# Patient Record
Sex: FEMALE | Race: WHITE | NOT HISPANIC OR LATINO | URBAN - METROPOLITAN AREA
[De-identification: names, ages, dates, MRNs, and addresses within clinical notes are randomized per-mention and may not be internally consistent; named-entity substitution may affect disease eponyms.]

---

## 2016-09-17 RX ORDER — FUROSEMIDE 40 MG
1 TABLET ORAL
Qty: 60 | Refills: 0 | COMMUNITY
Start: 2016-09-17 | End: 2016-10-17

## 2016-09-17 RX ORDER — INSULIN GLARGINE 100 [IU]/ML
80 INJECTION, SOLUTION SUBCUTANEOUS
Qty: 1 | Refills: 0 | COMMUNITY
Start: 2016-09-17 | End: 2016-10-17

## 2016-09-17 RX ORDER — TIMOLOL 0.5 %
1 DROPS OPHTHALMIC (EYE)
Qty: 1 | Refills: 0 | COMMUNITY
Start: 2016-09-17 | End: 2016-10-17

## 2016-09-17 RX ORDER — ENOXAPARIN SODIUM 100 MG/ML
80 INJECTION SUBCUTANEOUS
Qty: 1 | Refills: 0 | COMMUNITY
Start: 2016-09-17 | End: 2016-10-17

## 2016-09-17 RX ORDER — INSULIN GLARGINE 100 [IU]/ML
40 INJECTION, SOLUTION SUBCUTANEOUS
Qty: 1 | Refills: 0 | COMMUNITY
Start: 2016-09-17 | End: 2016-10-17

## 2016-09-17 RX ORDER — ATROPINE SULFATE 1 %
1 DROPS OPHTHALMIC (EYE)
Qty: 1 | Refills: 0 | COMMUNITY
Start: 2016-09-17 | End: 2016-10-17

## 2016-09-17 RX ORDER — BRIMONIDINE TARTRATE 2 MG/MG
1 SOLUTION/ DROPS OPHTHALMIC
Qty: 1 | Refills: 0 | COMMUNITY
Start: 2016-09-17 | End: 2016-10-17

## 2016-09-18 RX ORDER — TAMSULOSIN HYDROCHLORIDE 0.4 MG/1
2 CAPSULE ORAL
Qty: 60 | Refills: 0 | COMMUNITY
Start: 2016-09-18 | End: 2016-10-18

## 2017-02-08 ENCOUNTER — INPATIENT (INPATIENT)
Facility: HOSPITAL | Age: 62
LOS: 2 days | Discharge: HOME | DRG: 392 | End: 2017-02-11
Attending: INTERNAL MEDICINE | Admitting: FAMILY MEDICINE
Payer: MEDICAID

## 2017-02-08 VITALS
WEIGHT: 265 LBS | OXYGEN SATURATION: 97 % | HEIGHT: 61 IN | TEMPERATURE: 98 F | DIASTOLIC BLOOD PRESSURE: 60 MMHG | SYSTOLIC BLOOD PRESSURE: 132 MMHG | RESPIRATION RATE: 18 BRPM | HEART RATE: 80 BPM

## 2017-02-08 DIAGNOSIS — I24.9 ACUTE ISCHEMIC HEART DISEASE, UNSPECIFIED: ICD-10-CM

## 2017-02-08 DIAGNOSIS — Z98.89 OTHER SPECIFIED POSTPROCEDURAL STATES: Chronic | ICD-10-CM

## 2017-02-08 DIAGNOSIS — Z98.1 ARTHRODESIS STATUS: Chronic | ICD-10-CM

## 2017-02-08 DIAGNOSIS — Z90.710 ACQUIRED ABSENCE OF BOTH CERVIX AND UTERUS: Chronic | ICD-10-CM

## 2017-02-08 DIAGNOSIS — Z90.49 ACQUIRED ABSENCE OF OTHER SPECIFIED PARTS OF DIGESTIVE TRACT: Chronic | ICD-10-CM

## 2017-02-08 LAB
ALBUMIN SERPL ELPH-MCNC: 3.4 G/DL — SIGNIFICANT CHANGE UP (ref 3.3–5.2)
ALP SERPL-CCNC: 121 U/L — HIGH (ref 40–120)
ALT FLD-CCNC: 18 U/L — SIGNIFICANT CHANGE UP
ANION GAP SERPL CALC-SCNC: 13 MMOL/L — SIGNIFICANT CHANGE UP (ref 5–17)
APTT BLD: 30.6 SEC — SIGNIFICANT CHANGE UP (ref 27.5–37.4)
AST SERPL-CCNC: 17 U/L — SIGNIFICANT CHANGE UP
BASOPHILS # BLD AUTO: 0.1 K/UL — SIGNIFICANT CHANGE UP (ref 0–0.2)
BASOPHILS NFR BLD AUTO: 0.9 % — SIGNIFICANT CHANGE UP (ref 0–2)
BILIRUB SERPL-MCNC: 0.3 MG/DL — LOW (ref 0.4–2)
BUN SERPL-MCNC: 28 MG/DL — HIGH (ref 8–20)
CALCIUM SERPL-MCNC: 9.3 MG/DL — SIGNIFICANT CHANGE UP (ref 8.6–10.2)
CHLORIDE SERPL-SCNC: 103 MMOL/L — SIGNIFICANT CHANGE UP (ref 98–107)
CK SERPL-CCNC: 58 U/L — SIGNIFICANT CHANGE UP (ref 25–170)
CO2 SERPL-SCNC: 22 MMOL/L — SIGNIFICANT CHANGE UP (ref 22–29)
CREAT SERPL-MCNC: 0.79 MG/DL — SIGNIFICANT CHANGE UP (ref 0.5–1.3)
EOSINOPHIL # BLD AUTO: 0.6 K/UL — HIGH (ref 0–0.5)
EOSINOPHIL NFR BLD AUTO: 8.9 % — HIGH (ref 0–6)
GLUCOSE SERPL-MCNC: 249 MG/DL — HIGH (ref 70–115)
HCT VFR BLD CALC: 35.4 % — LOW (ref 37–47)
HGB BLD-MCNC: 11.3 G/DL — LOW (ref 12–16)
INR BLD: 1.02 RATIO — SIGNIFICANT CHANGE UP (ref 0.88–1.16)
LYMPHOCYTES # BLD AUTO: 1.6 K/UL — SIGNIFICANT CHANGE UP (ref 1–4.8)
LYMPHOCYTES # BLD AUTO: 24.3 % — SIGNIFICANT CHANGE UP (ref 20–55)
MCHC RBC-ENTMCNC: 28.3 PG — SIGNIFICANT CHANGE UP (ref 27–31)
MCHC RBC-ENTMCNC: 31.9 G/DL — LOW (ref 32–36)
MCV RBC AUTO: 88.5 FL — SIGNIFICANT CHANGE UP (ref 81–99)
MONOCYTES # BLD AUTO: 0.5 K/UL — SIGNIFICANT CHANGE UP (ref 0–0.8)
MONOCYTES NFR BLD AUTO: 7.4 % — SIGNIFICANT CHANGE UP (ref 3–10)
NEUTROPHILS # BLD AUTO: 3.8 K/UL — SIGNIFICANT CHANGE UP (ref 1.8–8)
NEUTROPHILS NFR BLD AUTO: 58.2 % — SIGNIFICANT CHANGE UP (ref 37–73)
NT-PROBNP SERPL-SCNC: 3832 PG/ML — HIGH (ref 0–300)
PLATELET # BLD AUTO: 156 K/UL — SIGNIFICANT CHANGE UP (ref 150–400)
POTASSIUM SERPL-MCNC: 3.7 MMOL/L — SIGNIFICANT CHANGE UP (ref 3.5–5.3)
POTASSIUM SERPL-SCNC: 3.7 MMOL/L — SIGNIFICANT CHANGE UP (ref 3.5–5.3)
PROT SERPL-MCNC: 7.4 G/DL — SIGNIFICANT CHANGE UP (ref 6.6–8.7)
PROTHROM AB SERPL-ACNC: 11.2 SEC — SIGNIFICANT CHANGE UP (ref 10–13.1)
RBC # BLD: 4 M/UL — LOW (ref 4.4–5.2)
RBC # FLD: 13.6 % — SIGNIFICANT CHANGE UP (ref 11–15.6)
SODIUM SERPL-SCNC: 138 MMOL/L — SIGNIFICANT CHANGE UP (ref 135–145)
TROPONIN T SERPL-MCNC: <0.01 NG/ML — SIGNIFICANT CHANGE UP (ref 0–0.06)
WBC # BLD: 6.49 K/UL — SIGNIFICANT CHANGE UP (ref 4.8–10.8)
WBC # FLD AUTO: 6.49 K/UL — SIGNIFICANT CHANGE UP (ref 4.8–10.8)

## 2017-02-08 PROCEDURE — 99223 1ST HOSP IP/OBS HIGH 75: CPT

## 2017-02-08 PROCEDURE — 99285 EMERGENCY DEPT VISIT HI MDM: CPT

## 2017-02-08 PROCEDURE — 93010 ELECTROCARDIOGRAM REPORT: CPT

## 2017-02-08 PROCEDURE — 73630 X-RAY EXAM OF FOOT: CPT | Mod: 26,LT

## 2017-02-08 PROCEDURE — 93306 TTE W/DOPPLER COMPLETE: CPT | Mod: 26

## 2017-02-08 PROCEDURE — 71010: CPT | Mod: 26

## 2017-02-08 RX ORDER — SODIUM CHLORIDE 9 MG/ML
3 INJECTION INTRAMUSCULAR; INTRAVENOUS; SUBCUTANEOUS ONCE
Qty: 0 | Refills: 0 | Status: COMPLETED | OUTPATIENT
Start: 2017-02-08 | End: 2017-02-08

## 2017-02-08 RX ORDER — FAMOTIDINE 10 MG/ML
20 INJECTION INTRAVENOUS ONCE
Qty: 0 | Refills: 0 | Status: COMPLETED | OUTPATIENT
Start: 2017-02-08 | End: 2017-02-08

## 2017-02-08 RX ORDER — ASPIRIN/CALCIUM CARB/MAGNESIUM 324 MG
325 TABLET ORAL ONCE
Qty: 0 | Refills: 0 | Status: COMPLETED | OUTPATIENT
Start: 2017-02-08 | End: 2017-02-08

## 2017-02-08 RX ORDER — PANTOPRAZOLE SODIUM 20 MG/1
40 TABLET, DELAYED RELEASE ORAL
Qty: 0 | Refills: 0 | Status: DISCONTINUED | OUTPATIENT
Start: 2017-02-08 | End: 2017-02-11

## 2017-02-08 RX ORDER — ENOXAPARIN SODIUM 100 MG/ML
40 INJECTION SUBCUTANEOUS EVERY 24 HOURS
Qty: 0 | Refills: 0 | Status: DISCONTINUED | OUTPATIENT
Start: 2017-02-08 | End: 2017-02-11

## 2017-02-08 RX ORDER — NITROGLYCERIN 6.5 MG
0.4 CAPSULE, EXTENDED RELEASE ORAL
Qty: 0 | Refills: 0 | Status: DISCONTINUED | OUTPATIENT
Start: 2017-02-08 | End: 2017-02-11

## 2017-02-08 RX ORDER — COLLAGENASE CLOSTRIDIUM HIST. 250 UNIT/G
1 OINTMENT (GRAM) TOPICAL ONCE
Qty: 0 | Refills: 0 | Status: COMPLETED | OUTPATIENT
Start: 2017-02-08 | End: 2017-02-08

## 2017-02-08 RX ORDER — LIDOCAINE 4 G/100G
5 CREAM TOPICAL ONCE
Qty: 0 | Refills: 0 | Status: COMPLETED | OUTPATIENT
Start: 2017-02-08 | End: 2017-02-08

## 2017-02-08 RX ADMIN — LIDOCAINE 5 MILLILITER(S): 4 CREAM TOPICAL at 19:38

## 2017-02-08 RX ADMIN — SODIUM CHLORIDE 3 MILLILITER(S): 9 INJECTION INTRAMUSCULAR; INTRAVENOUS; SUBCUTANEOUS at 17:38

## 2017-02-08 RX ADMIN — Medication 325 MILLIGRAM(S): at 19:37

## 2017-02-08 RX ADMIN — FAMOTIDINE 20 MILLIGRAM(S): 10 INJECTION INTRAVENOUS at 19:38

## 2017-02-08 RX ADMIN — Medication 30 MILLILITER(S): at 19:38

## 2017-02-08 NOTE — H&P ADULT. - RS GEN PE MLT RESP DETAILS PC
no intercostal retractions/clear to auscultation bilaterally/no rhonchi/no chest wall tenderness/no rales/no subcutaneous emphysema/no wheezes

## 2017-02-08 NOTE — ED PROVIDER NOTE - OBJECTIVE STATEMENT
62 y/o female in ED c/o midsternal burning cp today.  pt states h/o MI in the past and feels the same.  pt states MI in 8/2016.  states last MI had pain radiating to neck but no neck today.  pt denies any fever, HA, sob, n/v/d/abd pain.  no sick contacts or recent travel.  tolerating PO.

## 2017-02-08 NOTE — ED ADULT NURSE NOTE - PMH
Acute promyelocytic leukemia    Asthma    Blind  Left Eye  Diabetes mellitus    Diabetic neuropathy    Hyperlipidemia    Hypertension    Hypothyroidism    ANIKA treated with BiPAP    Osteoarthritis    Psoriatic arthritis    Tuberculosis  Treated at the age of 3 years

## 2017-02-08 NOTE — ED ADULT NURSE NOTE - OBJECTIVE STATEMENT
Pt A&OX3, c/o chest pain that radiated to her shoulders. Denies SOB at this time. VSS. Clear BBS. abd soft, nondistended and nontender. moving all extremities well. safety maintained. Pt A&OX3, c/o chest pain that radiated to her shoulders. Denies SOB at this time. VSS. Clear BBS. abd soft, nondistended and nontender. moving all extremities well. safety maintained.  Generalized skin rash noted.

## 2017-02-08 NOTE — H&P ADULT. - GASTROINTESTINAL DETAILS
normal/nontender/no rigidity/no distention/no masses palpable/no rebound tenderness/no organomegaly/no guarding/bowel sounds normal/no bruit/soft

## 2017-02-08 NOTE — ED ADULT NURSE REASSESSMENT NOTE - NS ED NURSE REASSESS COMMENT FT1
klever received awake and alert - states that she has chest pain and that she cannot tolerate any medication - right chest wall port accessed and in place unable to obtain blood, klever states that she wants nurses to make all attempts at pulling blood from her port rather then obtaining a butterfly stick - patient with several complaints, all of which the RN's have tried to accompany at this time. emotional support provided

## 2017-02-08 NOTE — H&P ADULT. - ASSESSMENT
ACS – atypical chest pain  -	Admit to Telemetry  -	Trend troponin  -	Cardiology consult appreciated  -	f/u ECHO  -	Nitro SL prn for chest pain  -	Cont aspirin/Plavix/bb/NC as needed.  -	Morphine 2mg   -	Zofran prn for nausea/vomiting  GERD   -	Maalox and pantoprazole  -	Pt is unable to recall last EGD, colonoscope Recommend out-patient GI follow-up for EGD   Left Heel wound, unstageable, dry, necrotic base  -	Podiatry consult appreciated  -	F/U ESR/CRP  -	No Antibiotics at this time  Tremors – cont out- patient neurological work-up with [facility]   Psoriasis – generalized  -	Pt refused treatment at this time  -	Nystatin powder for skin folds, mild candidiasis   Chronic Back pain  -	OOB with assistance, pt needs wheelchair for long distance commute   ANIKA on Cpap  -	Cont home CPAP, pt brought in her own  -	To be evaluated by [facility] for safety  -	Duonebs PRN for wheezing  DMT2  -	A1C 6.1 (9/16)  -	RISS, Accu checks, hypoglycemia protocol  -	DASH/Carb consistent diet  -	Lantus 40U HS, Novolog 15U TID  HLD  -	f/u lipid panel   -	cont statin 80mg daily  DVT prophylaxis

## 2017-02-08 NOTE — H&P ADULT. - FAMILY HISTORY
Sibling  Still living? Unknown  Family history of diabetes mellitus, Age at diagnosis: Age Unknown  Family history of cancer, Age at diagnosis: Age Unknown  Family history of blood disorder, Age at diagnosis: Age Unknown     Grandparent  Still living? Unknown  Family history of coronary artery disease, Age at diagnosis: Age Unknown

## 2017-02-08 NOTE — ED PROVIDER NOTE - PROGRESS NOTE DETAILS
case d/w Tino (marlene) in ED and will evaluate pt pt evalauted by Reyes and agree with admit for acs w/u case d/w Reyes (cards) in ED and will evaluate pt lee ann Champagne and will admit

## 2017-02-08 NOTE — ED ADULT NURSE NOTE - PSH
H/O abdominal hysterectomy  secondry to Fibroids  S/P carpal tunnel release    S/P cervical spinal fusion    S/P  section  x 6  S/P cholecystectomy

## 2017-02-09 DIAGNOSIS — E03.9 HYPOTHYROIDISM, UNSPECIFIED: ICD-10-CM

## 2017-02-09 DIAGNOSIS — R13.10 DYSPHAGIA, UNSPECIFIED: ICD-10-CM

## 2017-02-09 DIAGNOSIS — E11.9 TYPE 2 DIABETES MELLITUS WITHOUT COMPLICATIONS: ICD-10-CM

## 2017-02-09 DIAGNOSIS — Z29.9 ENCOUNTER FOR PROPHYLACTIC MEASURES, UNSPECIFIED: ICD-10-CM

## 2017-02-09 DIAGNOSIS — G47.33 OBSTRUCTIVE SLEEP APNEA (ADULT) (PEDIATRIC): ICD-10-CM

## 2017-02-09 DIAGNOSIS — I24.9 ACUTE ISCHEMIC HEART DISEASE, UNSPECIFIED: ICD-10-CM

## 2017-02-09 DIAGNOSIS — J45.909 UNSPECIFIED ASTHMA, UNCOMPLICATED: ICD-10-CM

## 2017-02-09 DIAGNOSIS — E66.01 MORBID (SEVERE) OBESITY DUE TO EXCESS CALORIES: ICD-10-CM

## 2017-02-09 DIAGNOSIS — L40.50 ARTHROPATHIC PSORIASIS, UNSPECIFIED: ICD-10-CM

## 2017-02-09 DIAGNOSIS — I10 ESSENTIAL (PRIMARY) HYPERTENSION: ICD-10-CM

## 2017-02-09 DIAGNOSIS — L97.409 NON-PRESSURE CHRONIC ULCER OF UNSPECIFIED HEEL AND MIDFOOT WITH UNSPECIFIED SEVERITY: ICD-10-CM

## 2017-02-09 DIAGNOSIS — I26.99 OTHER PULMONARY EMBOLISM WITHOUT ACUTE COR PULMONALE: ICD-10-CM

## 2017-02-09 DIAGNOSIS — E11.40 TYPE 2 DIABETES MELLITUS WITH DIABETIC NEUROPATHY, UNSPECIFIED: ICD-10-CM

## 2017-02-09 LAB — TROPONIN T SERPL-MCNC: <0.01 NG/ML — SIGNIFICANT CHANGE UP (ref 0–0.06)

## 2017-02-09 PROCEDURE — 99233 SBSQ HOSP IP/OBS HIGH 50: CPT

## 2017-02-09 RX ORDER — HYDRALAZINE HCL 50 MG
10 TABLET ORAL THREE TIMES A DAY
Qty: 0 | Refills: 0 | Status: DISCONTINUED | OUTPATIENT
Start: 2017-02-09 | End: 2017-02-11

## 2017-02-09 RX ORDER — ATORVASTATIN CALCIUM 80 MG/1
80 TABLET, FILM COATED ORAL AT BEDTIME
Qty: 0 | Refills: 0 | Status: DISCONTINUED | OUTPATIENT
Start: 2017-02-09 | End: 2017-02-11

## 2017-02-09 RX ORDER — INSULIN GLARGINE 100 [IU]/ML
40 INJECTION, SOLUTION SUBCUTANEOUS AT BEDTIME
Qty: 0 | Refills: 0 | Status: DISCONTINUED | OUTPATIENT
Start: 2017-02-09 | End: 2017-02-11

## 2017-02-09 RX ORDER — LANOLIN/MINERAL OIL
1 LOTION (ML) TOPICAL
Qty: 0 | Refills: 0 | Status: DISCONTINUED | OUTPATIENT
Start: 2017-02-09 | End: 2017-02-11

## 2017-02-09 RX ORDER — DOCUSATE SODIUM 100 MG
100 CAPSULE ORAL
Qty: 0 | Refills: 0 | Status: DISCONTINUED | OUTPATIENT
Start: 2017-02-09 | End: 2017-02-11

## 2017-02-09 RX ORDER — DEXTROSE 50 % IN WATER 50 %
1 SYRINGE (ML) INTRAVENOUS ONCE
Qty: 0 | Refills: 0 | Status: DISCONTINUED | OUTPATIENT
Start: 2017-02-09 | End: 2017-02-11

## 2017-02-09 RX ORDER — ASPIRIN/CALCIUM CARB/MAGNESIUM 324 MG
81 TABLET ORAL DAILY
Qty: 0 | Refills: 0 | Status: DISCONTINUED | OUTPATIENT
Start: 2017-02-09 | End: 2017-02-11

## 2017-02-09 RX ORDER — GLUCAGON INJECTION, SOLUTION 0.5 MG/.1ML
1 INJECTION, SOLUTION SUBCUTANEOUS ONCE
Qty: 0 | Refills: 0 | Status: DISCONTINUED | OUTPATIENT
Start: 2017-02-09 | End: 2017-02-11

## 2017-02-09 RX ORDER — IPRATROPIUM/ALBUTEROL SULFATE 18-103MCG
3 AEROSOL WITH ADAPTER (GRAM) INHALATION EVERY 6 HOURS
Qty: 0 | Refills: 0 | Status: DISCONTINUED | OUTPATIENT
Start: 2017-02-09 | End: 2017-02-11

## 2017-02-09 RX ORDER — TIMOLOL 0.5 %
1 DROPS OPHTHALMIC (EYE)
Qty: 0 | Refills: 0 | Status: DISCONTINUED | OUTPATIENT
Start: 2017-02-09 | End: 2017-02-11

## 2017-02-09 RX ORDER — COLLAGENASE CLOSTRIDIUM HIST. 250 UNIT/G
1 OINTMENT (GRAM) TOPICAL DAILY
Qty: 0 | Refills: 0 | Status: DISCONTINUED | OUTPATIENT
Start: 2017-02-09 | End: 2017-02-11

## 2017-02-09 RX ORDER — DEXTROSE 50 % IN WATER 50 %
25 SYRINGE (ML) INTRAVENOUS ONCE
Qty: 0 | Refills: 0 | Status: DISCONTINUED | OUTPATIENT
Start: 2017-02-09 | End: 2017-02-11

## 2017-02-09 RX ORDER — CARVEDILOL PHOSPHATE 80 MG/1
6.25 CAPSULE, EXTENDED RELEASE ORAL EVERY 12 HOURS
Qty: 0 | Refills: 0 | Status: DISCONTINUED | OUTPATIENT
Start: 2017-02-09 | End: 2017-02-11

## 2017-02-09 RX ORDER — ONDANSETRON 8 MG/1
4 TABLET, FILM COATED ORAL EVERY 6 HOURS
Qty: 0 | Refills: 0 | Status: DISCONTINUED | OUTPATIENT
Start: 2017-02-09 | End: 2017-02-11

## 2017-02-09 RX ORDER — ATROPINE SULFATE 1 %
1 DROPS OPHTHALMIC (EYE)
Qty: 0 | Refills: 0 | Status: DISCONTINUED | OUTPATIENT
Start: 2017-02-09 | End: 2017-02-11

## 2017-02-09 RX ORDER — ISOSORBIDE MONONITRATE 60 MG/1
60 TABLET, EXTENDED RELEASE ORAL DAILY
Qty: 0 | Refills: 0 | Status: DISCONTINUED | OUTPATIENT
Start: 2017-02-09 | End: 2017-02-11

## 2017-02-09 RX ORDER — FUROSEMIDE 40 MG
40 TABLET ORAL
Qty: 0 | Refills: 0 | Status: DISCONTINUED | OUTPATIENT
Start: 2017-02-09 | End: 2017-02-11

## 2017-02-09 RX ORDER — DEXTROSE 50 % IN WATER 50 %
12.5 SYRINGE (ML) INTRAVENOUS ONCE
Qty: 0 | Refills: 0 | Status: DISCONTINUED | OUTPATIENT
Start: 2017-02-09 | End: 2017-02-11

## 2017-02-09 RX ORDER — INSULIN LISPRO 100/ML
VIAL (ML) SUBCUTANEOUS
Qty: 0 | Refills: 0 | Status: DISCONTINUED | OUTPATIENT
Start: 2017-02-09 | End: 2017-02-11

## 2017-02-09 RX ORDER — CLOPIDOGREL BISULFATE 75 MG/1
75 TABLET, FILM COATED ORAL DAILY
Qty: 0 | Refills: 0 | Status: DISCONTINUED | OUTPATIENT
Start: 2017-02-09 | End: 2017-02-11

## 2017-02-09 RX ORDER — LEVOTHYROXINE SODIUM 125 MCG
100 TABLET ORAL DAILY
Qty: 0 | Refills: 0 | Status: DISCONTINUED | OUTPATIENT
Start: 2017-02-09 | End: 2017-02-09

## 2017-02-09 RX ORDER — INSULIN LISPRO 100/ML
15 VIAL (ML) SUBCUTANEOUS
Qty: 0 | Refills: 0 | Status: DISCONTINUED | OUTPATIENT
Start: 2017-02-09 | End: 2017-02-11

## 2017-02-09 RX ORDER — LEVOTHYROXINE SODIUM 125 MCG
112 TABLET ORAL DAILY
Qty: 0 | Refills: 0 | Status: DISCONTINUED | OUTPATIENT
Start: 2017-02-09 | End: 2017-02-11

## 2017-02-09 RX ORDER — MORPHINE SULFATE 50 MG/1
2 CAPSULE, EXTENDED RELEASE ORAL EVERY 6 HOURS
Qty: 0 | Refills: 0 | Status: DISCONTINUED | OUTPATIENT
Start: 2017-02-09 | End: 2017-02-11

## 2017-02-09 RX ORDER — TAMSULOSIN HYDROCHLORIDE 0.4 MG/1
0.4 CAPSULE ORAL AT BEDTIME
Qty: 0 | Refills: 0 | Status: DISCONTINUED | OUTPATIENT
Start: 2017-02-09 | End: 2017-02-11

## 2017-02-09 RX ORDER — SODIUM CHLORIDE 9 MG/ML
1000 INJECTION, SOLUTION INTRAVENOUS
Qty: 0 | Refills: 0 | Status: DISCONTINUED | OUTPATIENT
Start: 2017-02-09 | End: 2017-02-11

## 2017-02-09 RX ORDER — BRIMONIDINE TARTRATE 2 MG/MG
1 SOLUTION/ DROPS OPHTHALMIC THREE TIMES A DAY
Qty: 0 | Refills: 0 | Status: DISCONTINUED | OUTPATIENT
Start: 2017-02-09 | End: 2017-02-11

## 2017-02-09 RX ORDER — MOMETASONE FUROATE 220 UG/1
1 INHALANT RESPIRATORY (INHALATION) DAILY
Qty: 0 | Refills: 0 | Status: DISCONTINUED | OUTPATIENT
Start: 2017-02-09 | End: 2017-02-11

## 2017-02-09 RX ORDER — MOMETASONE FUROATE 220 UG/1
1 INHALANT RESPIRATORY (INHALATION) DAILY
Qty: 0 | Refills: 0 | Status: DISCONTINUED | OUTPATIENT
Start: 2017-02-09 | End: 2017-02-09

## 2017-02-09 RX ADMIN — BRIMONIDINE TARTRATE 1 DROP(S): 2 SOLUTION/ DROPS OPHTHALMIC at 22:41

## 2017-02-09 RX ADMIN — Medication 10 MILLIGRAM(S): at 22:40

## 2017-02-09 RX ADMIN — TAMSULOSIN HYDROCHLORIDE 0.4 MILLIGRAM(S): 0.4 CAPSULE ORAL at 22:41

## 2017-02-09 RX ADMIN — Medication 40 MILLIGRAM(S): at 18:31

## 2017-02-09 RX ADMIN — Medication 1 DROP(S): at 18:31

## 2017-02-09 RX ADMIN — ONDANSETRON 4 MILLIGRAM(S): 8 TABLET, FILM COATED ORAL at 22:40

## 2017-02-09 RX ADMIN — Medication 4: at 13:43

## 2017-02-09 RX ADMIN — Medication 81 MILLIGRAM(S): at 12:15

## 2017-02-09 RX ADMIN — Medication 10 MILLIGRAM(S): at 05:50

## 2017-02-09 RX ADMIN — Medication 1 APPLICATION(S): at 05:50

## 2017-02-09 RX ADMIN — Medication 1 APPLICATION(S): at 13:39

## 2017-02-09 RX ADMIN — Medication 300 MILLIGRAM(S): at 22:40

## 2017-02-09 RX ADMIN — ATORVASTATIN CALCIUM 80 MILLIGRAM(S): 80 TABLET, FILM COATED ORAL at 22:40

## 2017-02-09 RX ADMIN — CARVEDILOL PHOSPHATE 6.25 MILLIGRAM(S): 80 CAPSULE, EXTENDED RELEASE ORAL at 05:50

## 2017-02-09 RX ADMIN — BRIMONIDINE TARTRATE 1 DROP(S): 2 SOLUTION/ DROPS OPHTHALMIC at 13:53

## 2017-02-09 RX ADMIN — Medication 100 MICROGRAM(S): at 05:50

## 2017-02-09 RX ADMIN — MORPHINE SULFATE 2 MILLIGRAM(S): 50 CAPSULE, EXTENDED RELEASE ORAL at 22:40

## 2017-02-09 RX ADMIN — Medication 40 MILLIGRAM(S): at 05:50

## 2017-02-09 RX ADMIN — Medication: at 23:30

## 2017-02-09 RX ADMIN — CLOPIDOGREL BISULFATE 75 MILLIGRAM(S): 75 TABLET, FILM COATED ORAL at 12:15

## 2017-02-09 RX ADMIN — Medication 100 MILLIGRAM(S): at 05:50

## 2017-02-09 RX ADMIN — Medication 4: at 09:45

## 2017-02-09 RX ADMIN — ISOSORBIDE MONONITRATE 60 MILLIGRAM(S): 60 TABLET, EXTENDED RELEASE ORAL at 12:15

## 2017-02-09 RX ADMIN — INSULIN GLARGINE 40 UNIT(S): 100 INJECTION, SOLUTION SUBCUTANEOUS at 22:40

## 2017-02-09 RX ADMIN — Medication 150 MILLIGRAM(S): at 13:43

## 2017-02-09 RX ADMIN — MORPHINE SULFATE 2 MILLIGRAM(S): 50 CAPSULE, EXTENDED RELEASE ORAL at 22:44

## 2017-02-09 RX ADMIN — CARVEDILOL PHOSPHATE 6.25 MILLIGRAM(S): 80 CAPSULE, EXTENDED RELEASE ORAL at 18:31

## 2017-02-09 RX ADMIN — PANTOPRAZOLE SODIUM 40 MILLIGRAM(S): 20 TABLET, DELAYED RELEASE ORAL at 05:50

## 2017-02-09 RX ADMIN — Medication 10 MILLIGRAM(S): at 13:43

## 2017-02-09 RX ADMIN — Medication 1 APPLICATION(S): at 18:31

## 2017-02-09 RX ADMIN — Medication 15 UNIT(S): at 13:44

## 2017-02-09 RX ADMIN — Medication 15 UNIT(S): at 09:44

## 2017-02-09 NOTE — CONSULT NOTE ADULT - ASSESSMENT
Chronic sensation of lump in lower chest with episodic nausea/ vomiting.  No recent EGD.  No hx of foreign bodies.  No hx of fungal esophagitis.  CT negative for HH or obvious esophageal pathology.  Other chest pain is not so atypical for angina.  Never had cardiac cath and newly placed on Plavix along with chronic aspirin.  Morbidly obese with ANIKA requiring  use of CPAP.  Low grade chronic dysphagia of unclear etiology;  no weight loss:  Current weight is about 265 and BMI: 50.

## 2017-02-09 NOTE — PROGRESS NOTE ADULT - PROBLEM SELECTOR PLAN 1
Negative cardiac enzymes  nothing on CM  Awaiting Echo results  Appreciate Cardiology input  Will get GI to see patient, requests [facility]  Protonix  Patient states is on ASA/Plavix for CAD, never had LHC due to inability to lay flat Negative cardiac enzymes  nothing on CM  Awaiting Echo results  Appreciate Cardiology input  Will get GI to see patient, requests [facility]  Protonix  Patient states is on ASA/Plavix for CAD, never had LHC due to inability to lay flat, followed by Dr. Bates

## 2017-02-09 NOTE — PROGRESS NOTE ADULT - ASSESSMENT
61yFemale PMHX CMP, HTN, DM admitted with atypical chest pain, has ruled out ACS. Pain likely GI and not cardiac asper Cardiology. Left heel necrotic foot ulcer noted. 61yFemale PMHX CMP, HTN, DM admitted with atypical chest pain, has ruled out ACS. Pain likely GI and not cardiac asper Cardiology, echo results pending. Left heel necrotic foot ulcer noted.

## 2017-02-09 NOTE — PROGRESS NOTE ADULT - PROBLEM SELECTOR PLAN 3
HGA1C pending  Lantus/Premeal/ISS HGA1C pending  Lantus/Premeal/ISS- did not receive Lantus last night  consistent carb diet

## 2017-02-09 NOTE — PROGRESS NOTE ADULT - SUBJECTIVE AND OBJECTIVE BOX
CC: chest discomfort (08 Feb 2017 21:05)    HPI:  This is a 61year old female developed "hot" mid-sternal chest sensation Tuesday evening after going to bed, the following morning her discomfort progressed to burning sensation which radiated to her jaw for a short duration, she is unable to specify time, aggravating or alleviating factors. She has experienced a prior episode during her myocardial infarction in 2016. She is currently under neurology evaluation for ongoing intermittent tremors which last occurred yesterday. Denies: fever, chills, cough, shortness of breath, nausea, vomiting, diarrhea, weight loss.   PT follows with Dr. Luna as her PCP, she is aware that once admitted Dr. Balderas will be her attending, patient refused and request hospitalist coverage for this visit. no specific reason given. (08 Feb 2017 21:05)    INTERVAL HPI/OVERNIGHT EVENTS: No events on monitor, states feels like food "gets stuck" when swallowing    Vital Signs Last 24 Hrs  T(C): 36.6, Max: 36.8 (02-08 @ 14:20)  T(F): 97.9, Max: 98.2 (02-08 @ 14:20)  HR: 71 (71 - 88)  BP: 122/56 (102/55 - 132/60)  BP(mean): --  RR: 16 (16 - 18)  SpO2: 98% (94% - 98%)  I&O's Detail      CARDIAC MARKERS ( 09 Feb 2017 06:01 )  x     / <0.01 ng/mL / x     / x     / x      CARDIAC MARKERS ( 08 Feb 2017 19:28 )  x     / <0.01 ng/mL / 58 U/L / x     / x                                11.3   6.49  )-----------( 156      ( 08 Feb 2017 19:28 )             35.4     08 Feb 2017 19:28    138    |  103    |  28.0   ----------------------------<  249    3.7     |  22.0   |  0.79     Ca    9.3        08 Feb 2017 19:28    TPro  7.4    /  Alb  3.4    /  TBili  0.3    /  DBili  x      /  AST  17     /  ALT  18     /  AlkPhos  121    08 Feb 2017 19:28    PT/INR - ( 08 Feb 2017 19:28 )   PT: 11.2 sec;   INR: 1.02 ratio         PTT - ( 08 Feb 2017 19:28 )  PTT:30.6 sec  CAPILLARY BLOOD GLUCOSE  241 (09 Feb 2017 08:31)  248 (09 Feb 2017 00:06)    LIVER FUNCTIONS - ( 08 Feb 2017 19:28 )  Alb: 3.4 g/dL / Pro: 7.4 g/dL / ALK PHOS: 121 U/L / ALT: 18 U/L / AST: 17 U/L / GGT: x           MEDICATIONS  (STANDING):  enoxaparin Injectable 40milliGRAM(s) SubCutaneous every 24 hours  pantoprazole    Tablet 40milliGRAM(s) Oral before breakfast  aspirin enteric coated 81milliGRAM(s) Oral daily  tamsulosin 0.4milliGRAM(s) Oral at bedtime  isosorbide   mononitrate ER Tablet (IMDUR) 60milliGRAM(s) Oral daily  pregabalin 300milliGRAM(s) Oral at bedtime  insulin glargine Injectable (LANTUS) 40Unit(s) SubCutaneous at bedtime  insulin lispro Injectable (HumaLOG) 15Unit(s) SubCutaneous three times a day before meals  insulin lispro (HumaLOG) corrective regimen sliding scale  SubCutaneous Before meals and at bedtime  dextrose 5%. 1000milliLiter(s) IV Continuous <Continuous>  dextrose 50% Injectable 12.5Gram(s) IV Push once  dextrose 50% Injectable 25Gram(s) IV Push once  dextrose 50% Injectable 25Gram(s) IV Push once  atorvastatin 80milliGRAM(s) Oral at bedtime  clopidogrel Tablet 75milliGRAM(s) Oral daily  carvedilol 6.25milliGRAM(s) Oral every 12 hours  mineral oil/petrolatum Hydrophilic Ointment 1Application(s) Topical two times a day  furosemide    Tablet 40milliGRAM(s) Oral two times a day  docusate sodium 100milliGRAM(s) Oral two times a day  mometasone 220 MICROgram(s) Inhaler 1Puff(s) Inhalation daily  hydrALAZINE 10milliGRAM(s) Oral three times a day  levothyroxine 112MICROGram(s) Oral daily    MEDICATIONS  (PRN):  nitroglycerin     SubLingual 0.4milliGRAM(s) SubLingual every 5 minutes PRN Chest Pain  aluminum hydroxide/magnesium hydroxide/simethicone Suspension 30milliLiter(s) Oral every 4 hours PRN Dyspepsia  dextrose Gel 1Dose(s) Oral once PRN Blood Glucose LESS THAN 70 milliGRAM(s)/deciliter  glucagon  Injectable 1milliGRAM(s) IntraMuscular once PRN Glucose LESS THAN 70 milligrams/deciliter  ALBUTerol/ipratropium for Nebulization 3milliLiter(s) Nebulizer every 6 hours PRN Shortness of Breath and/or Wheezing  morphine  - Injectable 2milliGRAM(s) IV Push every 6 hours PRN Severe Pain (7 - 10)  ondansetron Injectable 4milliGRAM(s) IV Push every 6 hours PRN Nausea and/or Vomiting      RADIOLOGY & ADDITIONAL TESTS:  ROS:  + left heal pain + regurgitates when eating  Denies chest pain, SOB, dizziness, lightheadedness, fever, chills, nausea, vomitting

## 2017-02-09 NOTE — PROGRESS NOTE ADULT - SUBJECTIVE AND OBJECTIVE BOX
GEMINI ROONEY  [id number]        Chief Complaint: f/u CP      Subjective: feels better, no recurrent CP      24 hour Tele: SR, no events        enoxaparin Injectable 40milliGRAM(s) SubCutaneous every 24 hours  nitroglycerin     SubLingual 0.4milliGRAM(s) SubLingual every 5 minutes PRN  aluminum hydroxide/magnesium hydroxide/simethicone Suspension 30milliLiter(s) Oral every 4 hours PRN  pantoprazole    Tablet 40milliGRAM(s) Oral before breakfast  aspirin enteric coated 81milliGRAM(s) Oral daily  tamsulosin 0.4milliGRAM(s) Oral at bedtime  isosorbide   mononitrate ER Tablet (IMDUR) 60milliGRAM(s) Oral daily  pregabalin 300milliGRAM(s) Oral at bedtime  insulin glargine Injectable (LANTUS) 40Unit(s) SubCutaneous at bedtime  insulin lispro Injectable (HumaLOG) 15Unit(s) SubCutaneous three times a day before meals  insulin lispro (HumaLOG) corrective regimen sliding scale  SubCutaneous Before meals and at bedtime  dextrose 5%. 1000milliLiter(s) IV Continuous <Continuous>  dextrose Gel 1Dose(s) Oral once PRN  dextrose 50% Injectable 12.5Gram(s) IV Push once  dextrose 50% Injectable 25Gram(s) IV Push once  dextrose 50% Injectable 25Gram(s) IV Push once  glucagon  Injectable 1milliGRAM(s) IntraMuscular once PRN  atorvastatin 80milliGRAM(s) Oral at bedtime  clopidogrel Tablet 75milliGRAM(s) Oral daily  carvedilol 6.25milliGRAM(s) Oral every 12 hours  ALBUTerol/ipratropium for Nebulization 3milliLiter(s) Nebulizer every 6 hours PRN  mineral oil/petrolatum Hydrophilic Ointment 1Application(s) Topical two times a day  furosemide    Tablet 40milliGRAM(s) Oral two times a day  docusate sodium 100milliGRAM(s) Oral two times a day  mometasone 220 MICROgram(s) Inhaler 1Puff(s) Inhalation daily  hydrALAZINE 10milliGRAM(s) Oral three times a day  levothyroxine 100MICROGram(s) Oral daily  morphine  - Injectable 2milliGRAM(s) IV Push every 6 hours PRN  ondansetron Injectable 4milliGRAM(s) IV Push every 6 hours PRN          Physical Exam:  T(C): 36.6, Max: 36.8 (02-08 @ 14:20)  HR: 71 (71 - 88)  BP: 122/56 (102/55 - 132/60)  RR: 16 (16 - 18)  SpO2: 98% (94% - 98%)  Wt(kg): --  General: Comfortable in NAD  HEENT: MMM, sclera anicteruc  Resp: CTA bilaterally, diminished at bases  CVS: soft s1s2, rrr, no s3/JVD  Abd: obese soft, NT, ND, BS+  Ext: No c/c/e, psoriatic plaque noted  Neuro A&O x3  Psych: Normal affect    I&O's Summary        Labs:   08 Feb 2017 19:28    138    |  103    |  28.0   ----------------------------<  249    3.7     |  22.0   |  0.79     Ca    9.3        08 Feb 2017 19:28    TPro  7.4    /  Alb  3.4    /  TBili  0.3    /  DBili  x      /  AST  17     /  ALT  18     /  AlkPhos  121    08 Feb 2017 19:28                          11.3   6.49  )-----------( 156      ( 08 Feb 2017 19:28 )             35.4     PT/INR - ( 08 Feb 2017 19:28 )   PT: 11.2 sec;   INR: 1.02 ratio         PTT - ( 08 Feb 2017 19:28 )  PTT:30.6 sec  CARDIAC MARKERS ( 09 Feb 2017 06:01 )  x     / <0.01 ng/mL / x     / x     / x      CARDIAC MARKERS ( 08 Feb 2017 19:28 )  x     / <0.01 ng/mL / 58 U/L / x     / x          Assessment:  61yFemale PMHX CMP, HTN, DM admitted with atypical chest pain, has ruled out ACS. Pain likely GI and not cardiac. Has elevation in BNP likely related to chronic systolic CHF, does not have significant decompensated CHF clinically.     Plan:  1. Follow up echo, otherwise no further cardiac work up if no significant change  2. Continue CV medications and diuretics to maintain euvolemia. On good medical regimen  3. Has had problems with renal insufficiency in the past and would not start ACEI at this time, may have been related  4. DC planning

## 2017-02-09 NOTE — PROGRESS NOTE ADULT - SUBJECTIVE AND OBJECTIVE BOX
A 61 year old female patient is seen bedside for a left posterior heel ulceration. The patient is admitted for chest pain. She states yesterday she felt some burning in the chest but assumed it was caused by the shrimp she had eaten and was simply acid reflux. She follows up with Satnam Becerra as an outpatient weekly for the left heel ulceration.  Patient is AAo x 3  Denies any N/F/C/V/D/SOB  NAD at this time    Allegies:  -Demerol  -Iodine  -Tramadol    PMH:  -DMII  -HTN  -TB  -hyperlipidemia  -OA  -legally blind    PSH:  -Carpal tunnel   -Csection  -cervical spine fusion    Vasc:  -DP/PT faintly palpable  -TG WNL  -CFT 3 secs x 10    Derm:  -Left posterior heel ulceration  -Wound has a necrotic cap  -No malodor  -no acute infectious signs  -serosanguinous discharge noted    Ortho:  -Muscle power is 5/5 bilaterally  -hammering of digits 2-5 bilaterally    Neuro:  -protective sensation slightly diminished    Assessment:  -Left posterior heel ulceration    Plan:  -Patient evaluated and her chart reviewed  -Left foot Xrays negative for OM  -Vascular consult pending  -Cleaned wound site with sterile saline flush  -Santyl/DSD dressing applied  -Ordered Santyl to be applied to the wound site daily  -Wound care orders in place for the nursing staff  -Podiatry will monitor the patient while she is in-house  -Thank you for the consultation

## 2017-02-09 NOTE — CONSULT NOTE ADULT - PROBLEM SELECTOR RECOMMENDATION 3
Low probability for FB in esophagus. or gastric dysmotility.  With request UGI/ esophagram for AM.  High anesthesia risk for EGD.  Maintain on chronic PPI therapy.

## 2017-02-10 DIAGNOSIS — K21.9 GASTRO-ESOPHAGEAL REFLUX DISEASE WITHOUT ESOPHAGITIS: ICD-10-CM

## 2017-02-10 DIAGNOSIS — I42.9 CARDIOMYOPATHY, UNSPECIFIED: ICD-10-CM

## 2017-02-10 PROCEDURE — 99233 SBSQ HOSP IP/OBS HIGH 50: CPT

## 2017-02-10 PROCEDURE — 93010 ELECTROCARDIOGRAM REPORT: CPT

## 2017-02-10 PROCEDURE — 74241: CPT | Mod: 26

## 2017-02-10 RX ORDER — MORPHINE SULFATE 50 MG/1
4 CAPSULE, EXTENDED RELEASE ORAL ONCE
Qty: 0 | Refills: 0 | Status: DISCONTINUED | OUTPATIENT
Start: 2017-02-10 | End: 2017-02-10

## 2017-02-10 RX ADMIN — Medication 81 MILLIGRAM(S): at 12:20

## 2017-02-10 RX ADMIN — Medication 150 MILLIGRAM(S): at 12:29

## 2017-02-10 RX ADMIN — CARVEDILOL PHOSPHATE 6.25 MILLIGRAM(S): 80 CAPSULE, EXTENDED RELEASE ORAL at 05:28

## 2017-02-10 RX ADMIN — MORPHINE SULFATE 4 MILLIGRAM(S): 50 CAPSULE, EXTENDED RELEASE ORAL at 12:54

## 2017-02-10 RX ADMIN — CARVEDILOL PHOSPHATE 6.25 MILLIGRAM(S): 80 CAPSULE, EXTENDED RELEASE ORAL at 17:49

## 2017-02-10 RX ADMIN — MORPHINE SULFATE 2 MILLIGRAM(S): 50 CAPSULE, EXTENDED RELEASE ORAL at 22:01

## 2017-02-10 RX ADMIN — Medication 300 MILLIGRAM(S): at 21:55

## 2017-02-10 RX ADMIN — Medication 6: at 12:30

## 2017-02-10 RX ADMIN — PANTOPRAZOLE SODIUM 40 MILLIGRAM(S): 20 TABLET, DELAYED RELEASE ORAL at 05:27

## 2017-02-10 RX ADMIN — Medication 40 MILLIGRAM(S): at 05:27

## 2017-02-10 RX ADMIN — Medication 1 APPLICATION(S): at 12:19

## 2017-02-10 RX ADMIN — Medication 40 MILLIGRAM(S): at 17:49

## 2017-02-10 RX ADMIN — Medication 1 APPLICATION(S): at 05:29

## 2017-02-10 RX ADMIN — ENOXAPARIN SODIUM 40 MILLIGRAM(S): 100 INJECTION SUBCUTANEOUS at 21:57

## 2017-02-10 RX ADMIN — ISOSORBIDE MONONITRATE 60 MILLIGRAM(S): 60 TABLET, EXTENDED RELEASE ORAL at 12:21

## 2017-02-10 RX ADMIN — Medication 6: at 09:30

## 2017-02-10 RX ADMIN — BRIMONIDINE TARTRATE 1 DROP(S): 2 SOLUTION/ DROPS OPHTHALMIC at 21:56

## 2017-02-10 RX ADMIN — CLOPIDOGREL BISULFATE 75 MILLIGRAM(S): 75 TABLET, FILM COATED ORAL at 12:20

## 2017-02-10 RX ADMIN — Medication 10 MILLIGRAM(S): at 05:28

## 2017-02-10 RX ADMIN — Medication 112 MICROGRAM(S): at 05:27

## 2017-02-10 RX ADMIN — Medication 1 DROP(S): at 17:49

## 2017-02-10 RX ADMIN — Medication 15 UNIT(S): at 17:50

## 2017-02-10 RX ADMIN — Medication 10 MILLIGRAM(S): at 21:55

## 2017-02-10 RX ADMIN — ONDANSETRON 4 MILLIGRAM(S): 8 TABLET, FILM COATED ORAL at 12:21

## 2017-02-10 RX ADMIN — ONDANSETRON 4 MILLIGRAM(S): 8 TABLET, FILM COATED ORAL at 22:01

## 2017-02-10 RX ADMIN — MORPHINE SULFATE 4 MILLIGRAM(S): 50 CAPSULE, EXTENDED RELEASE ORAL at 12:21

## 2017-02-10 RX ADMIN — MORPHINE SULFATE 2 MILLIGRAM(S): 50 CAPSULE, EXTENDED RELEASE ORAL at 23:09

## 2017-02-10 RX ADMIN — Medication 1 APPLICATION(S): at 17:43

## 2017-02-10 RX ADMIN — Medication 1 DROP(S): at 05:28

## 2017-02-10 RX ADMIN — Medication 100 MILLIGRAM(S): at 05:28

## 2017-02-10 RX ADMIN — ATORVASTATIN CALCIUM 80 MILLIGRAM(S): 80 TABLET, FILM COATED ORAL at 21:55

## 2017-02-10 RX ADMIN — TAMSULOSIN HYDROCHLORIDE 0.4 MILLIGRAM(S): 0.4 CAPSULE ORAL at 21:57

## 2017-02-10 RX ADMIN — BRIMONIDINE TARTRATE 1 DROP(S): 2 SOLUTION/ DROPS OPHTHALMIC at 05:28

## 2017-02-10 RX ADMIN — INSULIN GLARGINE 40 UNIT(S): 100 INJECTION, SOLUTION SUBCUTANEOUS at 21:56

## 2017-02-10 RX ADMIN — Medication 2: at 17:50

## 2017-02-10 NOTE — PROGRESS NOTE ADULT - PROBLEM SELECTOR PLAN 3
HGA1C pending  Lantus/Premeal/ISS  consistent carb diet.   monitor BG may need to increase Lantus if remains elevated, has been NPO for tests today

## 2017-02-10 NOTE — PROGRESS NOTE ADULT - SUBJECTIVE AND OBJECTIVE BOX
CC: chest discomfort    HPI:  This is a 61year old female developed "hot" mid-sternal chest sensation Tuesday evening after going to bed, the following morning her discomfort progressed to burning sensation which radiated to her jaw for a short duration, she is unable to specify time, aggravating or alleviating factors. She has experienced a prior episode during her myocardial infarction in 2016. She is currently under neurology evaluation for ongoing intermittent tremors which last occurred yesterday. Denies: fever, chills, cough, shortness of breath, nausea, vomiting, diarrhea, weight loss.       INTERVAL HPI/OVERNIGHT EVENTS: Generalized pain    Vital Signs Last 24 Hrs  T(C): 36.8, Max: 36.9 (02-09 @ 12:12)  T(F): 98.2, Max: 98.4 (02-09 @ 12:12)  HR: 81 (69 - 81)  BP: 112/66 (112/66 - 120/62)  BP(mean): --  RR: 16 (16 - 16)  SpO2: 96% (96% - 100%)  I&O's Detail    I & Os for current day (as of 10 Feb 2017 11:42)  =============================================  IN:    Oral Fluid: 240 ml    Total IN: 240 ml  ---------------------------------------------  OUT:    Voided: 1 ml    Total OUT: 1 ml  ---------------------------------------------  Total NET: 239 ml      CARDIAC MARKERS ( 09 Feb 2017 06:01 )  x     / <0.01 ng/mL / x     / x     / x      CARDIAC MARKERS ( 08 Feb 2017 19:28 )  x     / <0.01 ng/mL / 58 U/L / x     / x                                11.3   6.49  )-----------( 156      ( 08 Feb 2017 19:28 )             35.4     08 Feb 2017 19:28    138    |  103    |  28.0   ----------------------------<  249    3.7     |  22.0   |  0.79     Ca    9.3        08 Feb 2017 19:28    TPro  7.4    /  Alb  3.4    /  TBili  0.3    /  DBili  x      /  AST  17     /  ALT  18     /  AlkPhos  121    08 Feb 2017 19:28    PT/INR - ( 08 Feb 2017 19:28 )   PT: 11.2 sec;   INR: 1.02 ratio         PTT - ( 08 Feb 2017 19:28 )  PTT:30.6 sec  CAPILLARY BLOOD GLUCOSE  281 (10 Feb 2017 08:32)  150 (09 Feb 2017 17:50)  225 (09 Feb 2017 12:12)    LIVER FUNCTIONS - ( 08 Feb 2017 19:28 )  Alb: 3.4 g/dL / Pro: 7.4 g/dL / ALK PHOS: 121 U/L / ALT: 18 U/L / AST: 17 U/L / GGT: x               MEDICATIONS  (STANDING):  enoxaparin Injectable 40milliGRAM(s) SubCutaneous every 24 hours  pantoprazole    Tablet 40milliGRAM(s) Oral before breakfast  aspirin enteric coated 81milliGRAM(s) Oral daily  tamsulosin 0.4milliGRAM(s) Oral at bedtime  isosorbide   mononitrate ER Tablet (IMDUR) 60milliGRAM(s) Oral daily  pregabalin 300milliGRAM(s) Oral at bedtime  insulin glargine Injectable (LANTUS) 40Unit(s) SubCutaneous at bedtime  insulin lispro Injectable (HumaLOG) 15Unit(s) SubCutaneous three times a day before meals  insulin lispro (HumaLOG) corrective regimen sliding scale  SubCutaneous Before meals and at bedtime  dextrose 5%. 1000milliLiter(s) IV Continuous <Continuous>  dextrose 50% Injectable 12.5Gram(s) IV Push once  dextrose 50% Injectable 25Gram(s) IV Push once  dextrose 50% Injectable 25Gram(s) IV Push once  atorvastatin 80milliGRAM(s) Oral at bedtime  clopidogrel Tablet 75milliGRAM(s) Oral daily  carvedilol 6.25milliGRAM(s) Oral every 12 hours  mineral oil/petrolatum Hydrophilic Ointment 1Application(s) Topical two times a day  furosemide    Tablet 40milliGRAM(s) Oral two times a day  docusate sodium 100milliGRAM(s) Oral two times a day  mometasone 220 MICROgram(s) Inhaler 1Puff(s) Inhalation daily  hydrALAZINE 10milliGRAM(s) Oral three times a day  levothyroxine 112MICROGram(s) Oral daily  pregabalin 150milliGRAM(s) Oral daily  atropine 1% Solution 1Drop(s) Left EYE two times a day  brimonidine 0.2% Ophthalmic Solution 1Drop(s) Right EYE three times a day  timolol 0.5% Solution 1Drop(s) Both EYES two times a day  collagenase Ointment 1Application(s) Topical daily    MEDICATIONS  (PRN):  nitroglycerin     SubLingual 0.4milliGRAM(s) SubLingual every 5 minutes PRN Chest Pain  aluminum hydroxide/magnesium hydroxide/simethicone Suspension 30milliLiter(s) Oral every 4 hours PRN Dyspepsia  dextrose Gel 1Dose(s) Oral once PRN Blood Glucose LESS THAN 70 milliGRAM(s)/deciliter  glucagon  Injectable 1milliGRAM(s) IntraMuscular once PRN Glucose LESS THAN 70 milligrams/deciliter  ALBUTerol/ipratropium for Nebulization 3milliLiter(s) Nebulizer every 6 hours PRN Shortness of Breath and/or Wheezing  morphine  - Injectable 2milliGRAM(s) IV Push every 6 hours PRN Severe Pain (7 - 10)  ondansetron Injectable 4milliGRAM(s) IV Push every 6 hours PRN Nausea and/or Vomiting      RADIOLOGY & ADDITIONAL TESTS:  EXAM:  ECHO TRANSTHORACIC COMP W DOPP      PROCEDURE DATE:  Feb 8 2017    Summary:  1. Technically limited study.   2. Endocardial definition is poor. Moderately reduced LV systolic   function with regional wall motin abnormalities as described above. Left   ventricular ejection fraction, by visual estimation, is 35 to 40%.   3. Spectral Doppler shows impaired relaxation pattern of left   ventricular myocardial filling (Grade I diastolic dysfunction).   4. Grossly normal RV systolic function.   5. Mild mitral regurgitation   6. ** Compared to TTE dated 8/16/16, echo contrast notused in today's   study, endocardial definition is suboptimal. Visually estimated LVEF is   comparable.      ROS:  +epigastric pain, + generalized pain back, joints, +left heel pain  Denies SOB, dizziness, lightheadedness ,SOB, no fever, nausea, vomiting

## 2017-02-10 NOTE — PROGRESS NOTE ADULT - SUBJECTIVE AND OBJECTIVE BOX
GEMINI ROONEY  [id number]        Chief Complaint: f/u CP      Subjective: feels better, no recurrent CP that brought her in. Had shooting type pain this morning that has resolved      24 hour Tele: SR, no events      enoxaparin Injectable 40milliGRAM(s) SubCutaneous every 24 hours  nitroglycerin     SubLingual 0.4milliGRAM(s) SubLingual every 5 minutes PRN  aluminum hydroxide/magnesium hydroxide/simethicone Suspension 30milliLiter(s) Oral every 4 hours PRN  pantoprazole    Tablet 40milliGRAM(s) Oral before breakfast  aspirin enteric coated 81milliGRAM(s) Oral daily  tamsulosin 0.4milliGRAM(s) Oral at bedtime  isosorbide   mononitrate ER Tablet (IMDUR) 60milliGRAM(s) Oral daily  pregabalin 300milliGRAM(s) Oral at bedtime  insulin glargine Injectable (LANTUS) 40Unit(s) SubCutaneous at bedtime  insulin lispro Injectable (HumaLOG) 15Unit(s) SubCutaneous three times a day before meals  insulin lispro (HumaLOG) corrective regimen sliding scale  SubCutaneous Before meals and at bedtime  dextrose 5%. 1000milliLiter(s) IV Continuous <Continuous>  dextrose Gel 1Dose(s) Oral once PRN  dextrose 50% Injectable 12.5Gram(s) IV Push once  dextrose 50% Injectable 25Gram(s) IV Push once  dextrose 50% Injectable 25Gram(s) IV Push once  glucagon  Injectable 1milliGRAM(s) IntraMuscular once PRN  atorvastatin 80milliGRAM(s) Oral at bedtime  clopidogrel Tablet 75milliGRAM(s) Oral daily  carvedilol 6.25milliGRAM(s) Oral every 12 hours  ALBUTerol/ipratropium for Nebulization 3milliLiter(s) Nebulizer every 6 hours PRN  mineral oil/petrolatum Hydrophilic Ointment 1Application(s) Topical two times a day  furosemide    Tablet 40milliGRAM(s) Oral two times a day  docusate sodium 100milliGRAM(s) Oral two times a day  mometasone 220 MICROgram(s) Inhaler 1Puff(s) Inhalation daily  hydrALAZINE 10milliGRAM(s) Oral three times a day  morphine  - Injectable 2milliGRAM(s) IV Push every 6 hours PRN  ondansetron Injectable 4milliGRAM(s) IV Push every 6 hours PRN  levothyroxine 112MICROGram(s) Oral daily  pregabalin 150milliGRAM(s) Oral daily  atropine 1% Solution 1Drop(s) Left EYE two times a day  brimonidine 0.2% Ophthalmic Solution 1Drop(s) Right EYE three times a day  timolol 0.5% Solution 1Drop(s) Both EYES two times a day  collagenase Ointment 1Application(s) Topical daily          Physical Exam:  T(C): 36.8, Max: 36.9 (02-09 @ 12:12)  HR: 81 (69 - 81)  BP: 112/66 (112/66 - 120/62)  RR: 16 (16 - 16)  SpO2: 96% (96% - 100%)  Wt(kg): --  General: Comfortable in NAD  HEENT: MMM, sclera anicteric  Resp: CTA bilaterally  CVS: soft s1s2, rrr, no s3/JVD  Abd: obese, soft, NT, ND, BS+  Ext: No c/c/e, psoriatic plaque noted  Neuro A&O x3  Psych: Normal affect    I&O's Summary    I & Os for current day (as of 10 Feb 2017 10:51)  =============================================  IN: 240 ml / OUT: 1 ml / NET: 239 ml        Labs:   08 Feb 2017 19:28    138    |  103    |  28.0   ----------------------------<  249    3.7     |  22.0   |  0.79     Ca    9.3        08 Feb 2017 19:28    TPro  7.4    /  Alb  3.4    /  TBili  0.3    /  DBili  x      /  AST  17     /  ALT  18     /  AlkPhos  121    08 Feb 2017 19:28                          11.3   6.49  )-----------( 156      ( 08 Feb 2017 19:28 )             35.4     PT/INR - ( 08 Feb 2017 19:28 )   PT: 11.2 sec;   INR: 1.02 ratio         PTT - ( 08 Feb 2017 19:28 )  PTT:30.6 sec  CARDIAC MARKERS ( 09 Feb 2017 06:01 )  x     / <0.01 ng/mL / x     / x     / x      CARDIAC MARKERS ( 08 Feb 2017 19:28 )  x     / <0.01 ng/mL / 58 U/L / x     / x        Echo (personally reviewed)  1) Moderate LV dysfunction, EF 35-40%  2) Hypokinesis of inferolateral/inferior and anterolateral walls  3) No significant valvular abnormalities  4) Grade II diastolic dysfunction      Assessment:  61yFemale PMHX CMP, HTN, DM admitted with atypical chest pain, has ruled out ACS. Pain likely GI and not cardiac. Has elevation in BNP likely related to chronic systolic CHF, does not have significant decompensated CHF clinically. Reviewed echo now and prior, maybe mild change in LV function but not significantly. Had regional wall motion abnormalities previously as well. Given pain non-cardiac and no decompensated CHF at this time, I recommend continued medical management of Cardiomyopathy. Agree with GI work up    Plan:  1. Medical management of CMP unless any change in clinical status  2. CV stable for EGD if deemed necessary  3. Continue ASa, plavix, statin, coreg, hydralazine and nitrates  4. BP well controlled

## 2017-02-10 NOTE — PROGRESS NOTE ADULT - ATTENDING COMMENTS
Pt is seen and examined, discussed with Angelica. Pt denied chest pain, c/o dysphagia, seen by GI, schedule for UGI and esophagogram today, increase Protonix bid, had TTE EF 35-40, as per Dr. Chambers, unchanged. She also has heel ulcer, seen by Podiatry and vascular surgery,, local wound care as per Podiatry.   ANIKA on Bipap
Pt is seen and examined, discussed with Merna. This is 61Y W admitted for chest pain, serial troponin negative, seen by cardiology, awaiting echo. Pt was also seen by Podiatry, local wound care as per Podiatry, also recommend vascular surgery consult. Pt report intermittent dysphagia, GI consult requested. Cont, rest of home medication.

## 2017-02-10 NOTE — CONSULT NOTE ADULT - SUBJECTIVE AND OBJECTIVE BOX
HPI:  This is a 61year old femalewithn CAD, morbid obesity; osteomyelitis of the left heel, DM. HLD, GERD? who developed "hot" mid-sternal chest burning sensation Tuesday evening after going to bed.  It persisted the following morning; her discomfort progressed to burning sensation which radiated to her jaw and face.  She recalls a similar sensation with her prior heart attack last summer.   She is unable to specify time/ duration.  No aggravating or alleviating factors. She did not palpitations. She has experienced a prior episode during her myocardial infarction in 2016.  Denies: fever, chills, cough, shortness of breath, nausea, vomiting, diarrhea, weight loss.Pt has a separate sensation of a lump in the mid chest region which has persisted for several months.  She derscribes regurgitation and some heartburn.  No abdoinal pain or distention.  No GI bleeding.  She has multiple chronic medical conditions.  She is not ambulatory due to a chronic nonhealing left heel ulcer.  She has had DM neuropathy.  but no gastroparesis or diarrhea.  She has had several EGD and [id number] exams with Dr Conner; Last EGD was yrs age.  No hx of BE.  Alleges that distantly she had a HP related Gastric ulcer.  Treated with antibiotics.  No ROBERT.  Last colonoscopy was 2 yrs ago: negative. Pt has chronic constipation; moves bowels q 3 days with the use of stool softeners.         PT follows with Dr. Luna as her PCP, she is aware that once admitted Dr. Balderas will be her attending, patient refused and request hospitalist coverage for this visit. no specific reason given. (2017 21:05)      PAST MEDICAL & SURGICAL HISTORY:  Tuberculosis: Treated at the age of 3 years  Psoriatic arthritis  Diabetic neuropathy  Psoriasis  Blind: Left Eye  Cirrhosis  ANIKA treated with BiPAP  Acute promyelocytic leukemia  Osteoarthritis  Hypothyroidism  Hypertension  Hyperlipidemia  Asthma  Diabetes mellitus  h/o  AML in , treated with1 yr of chemotherapy.  Resolved without recurrence.    Sepsis due to infected infusiport in 2016:  Antibiotics and replaced.   S/P  section: x 6  S/P carpal tunnel release  S/P cervical spinal fusion  S/P cholecystectomy  H/O abdominal hysterectomy: secondry to Fibroids      ROS:  No Heartburn, regurgitation, dysphagia, odynophagia.  No dyspepsia  No abdominal pain.    No Nausea, vomiting.  No Bleeding.  No hematemesis.   No diarrhea.    No hematochesia.  No weight loss, anorexia.  No edema.      MEDICATIONS  (STANDING):  enoxaparin Injectable 40milliGRAM(s) SubCutaneous every 24 hours  pantoprazole    Tablet 40milliGRAM(s) Oral before breakfast  aspirin enteric coated 81milliGRAM(s) Oral daily  tamsulosin 0.4milliGRAM(s) Oral at bedtime  isosorbide   mononitrate ER Tablet (IMDUR) 60milliGRAM(s) Oral daily  pregabalin 300milliGRAM(s) Oral at bedtime  insulin glargine Injectable (LANTUS) 40Unit(s) SubCutaneous at bedtime  insulin lispro Injectable (HumaLOG) 15Unit(s) SubCutaneous three times a day before meals  insulin lispro (HumaLOG) corrective regimen sliding scale  SubCutaneous Before meals and at bedtime  dextrose 5%. 1000milliLiter(s) IV Continuous <Continuous>  dextrose 50% Injectable 12.5Gram(s) IV Push once  dextrose 50% Injectable 25Gram(s) IV Push once  dextrose 50% Injectable 25Gram(s) IV Push once  atorvastatin 80milliGRAM(s) Oral at bedtime  clopidogrel Tablet 75milliGRAM(s) Oral daily  carvedilol 6.25milliGRAM(s) Oral every 12 hours  mineral oil/petrolatum Hydrophilic Ointment 1Application(s) Topical two times a day  furosemide    Tablet 40milliGRAM(s) Oral two times a day  docusate sodium 100milliGRAM(s) Oral two times a day  mometasone 220 MICROgram(s) Inhaler 1Puff(s) Inhalation daily  hydrALAZINE 10milliGRAM(s) Oral three times a day  levothyroxine 112MICROGram(s) Oral daily  atropine 1% Solution 1Drop(s) Left EYE two times a day  brimonidine 0.2% Ophthalmic Solution 1Drop(s) Right EYE three times a day  timolol 0.5% Solution 1Drop(s) Both EYES two times a day  collagenase Ointment 1Application(s) Topical daily    MEDICATIONS  (PRN):  nitroglycerin     SubLingual 0.4milliGRAM(s) SubLingual every 5 minutes PRN Chest Pain  aluminum hydroxide/magnesium hydroxide/simethicone Suspension 30milliLiter(s) Oral every 4 hours PRN Dyspepsia  dextrose Gel 1Dose(s) Oral once PRN Blood Glucose LESS THAN 70 milliGRAM(s)/deciliter  glucagon  Injectable 1milliGRAM(s) IntraMuscular once PRN Glucose LESS THAN 70 milligrams/deciliter  ALBUTerol/ipratropium for Nebulization 3milliLiter(s) Nebulizer every 6 hours PRN Shortness of Breath and/or Wheezing  morphine  - Injectable 2milliGRAM(s) IV Push every 6 hours PRN Severe Pain (7 - 10)  ondansetron Injectable 4milliGRAM(s) IV Push every 6 hours PRN Nausea and/or Vomiting      Allergies    Demerol HCl (Hives)  iodine (Unknown)  iodine containing compounds (Anaphylaxis)  loperamide (Unknown)  phenylpiperidine derivatives (Unknown)  tramadol (Unknown)    SOCIAL HISTORY:  Neg for TOB, ETOH of IVDU.      FAMILY HISTORY:  Family history of coronary artery disease (Grandparent)  Family history of blood disorder (Sibling)  Family history of cancer (Sibling): Sisters - Ovarian Caner and Lung Cancer  Brothers - Lung Cancer  Family history of diabetes mellitus (Sibling)  No pertinent family history in first degree relatives      Vital Signs Last 24 Hrs  T(C): 36.9, Max: 36.9 (02-09 @ 12:12)  T(F): 98.4, Max: 98.4 (02-09 @ 12:12)  HR: 81 (69 - 88)  BP: 118/62 (102/55 - 122/56)  BP(mean): --  RR: 16 (16 - 18)  SpO2: 97% (94% - 100%)    PHYSICAL EXAM:    GENERAL: NAD, well-groomed, well-developed  HEAD:  Atraumatic, Normocephalic  EYES: EOMI, PERRLA, conjunctiva and sclera clear  ENMT: No tonsillar erythema, exudates, or enlargement; Moist mucous membranes, Good dentition, No lesions  NECK: Supple, No JVD, Normal thyroid  CHEST/LUNG: Clear to percussion bilaterally; No rales, rhonchi, wheezing, or rubs  HEART: Regular rate and rhythm; No murmurs, rubs, or gallops  ABDOMEN: Soft, Nontender, Nondistended; Bowel sounds present; midline lower abd scar from hysterectomy and C Sections.  CARLEEN: No lesions or hemorrhoids.  Brown formed stool.  No overt blood.    EXTREMITIES:  2+ Peripheral Pulses, No clubbing, cyanosis, or edema  LYMPH: No lymphadenopathy noted  SKIN: No rashes or lesions      LABS:                        11.3   6.49  )-----------( 156      ( 2017 19:28 )             35.4     2017 19:28    138    |  103    |  28.0   ----------------------------<  249    3.7     |  22.0   |  0.79     Ca    9.3        2017 19:28    TPro  7.4    /  Alb  3.4    /  TBili  0.3    /  DBili  x      /  AST  17     /  ALT  18     /  AlkPhos  121    2017 19:28    PT/INR - ( 2017 19:28 )   PT: 11.2 sec;   INR: 1.02 ratio         PTT - ( 2017 19:28 )  PTT:30.6 sec       LIVER FUNCTIONS - ( 2017 19:28 )  Alb: 3.4 g/dL / Pro: 7.4 g/dL / ALK PHOS: 121 U/L / ALT: 18 U/L / AST: 17 U/L / GGT: x               RADIOLOGY & ADDITIONAL STUDIES:  CT C/ A / P:  2016:  Absent GB and uterus.  DJD.  Infusiport.  No acute or chronic intra-abdominal pathology.  No HH or gastric distention.
A 61 year old female patient is seen in the  emergency room with a chief podiatric complaint of a left posterior heel ulceration. The patient is admitted for chest pain. She states yesterday she felt some burning in the chest but assumed it was caused by the shrimp she had eaten and was simply acid reflux. She follows up with Satnam Becerra as an outpatient weekly for the left heel ulceration.  Patient is AAo x 3  Denies any N/F/C/V/D/SOB  NAD at this time    Allegies:  -Demerol  -Iodine  -Tramadol    PMH:  -DMII  -HTN  -TB  -hyperlipidemia  -OA  -legally blind    PSH:  -Carpal tunnel   -Csection  -cervical spine fusion    Vasc:  -DP/PT faintly palpable  -TG WNL  -CFT 3 secs x 10    Derm:  -Left posterior heel ulceration  -Wound has a necrotic cap  -No malodor  -no acute infectious signs  -serosanguinous discharge noted    Ortho:  -Muscle power is 5/5 bilaterally  -hammering of digits 2-5 bilaterally    Neuro:  -protective sensation slightly diminished    Assessment:  -Left posterior heel ulceration    Plan:  -Patient evaluated and her chart reviewed  -Ordered xrays of the left foot  -Please consider a vascular consult  -Cleaned wound site with sterile saline flush  -DSD dressing applied  -Ordered Santyl to be applied to the wound site daily  -Wound care orders in place for the nursing staff  -Podiatry will monitor the patient while she is in-house  -Thank you for the consultation
CHIEF COMPLAINT:    HPI: Patient is a  61y Female with h/o obesity, HTN, DM, CMP (EF40-45%) here with CP. Began last night, was burning in middle of chest. Felt mildly sweaty as well. Went to sleep with pain and continued when woke up, but more severe. No associated nausea/vomiting, has become mildly SOB here in ED. No fevers/chills. Felt tremors earlier in week, not today.     PAST MEDICAL & SURGICAL HISTORY:  Tuberculosis: Treated at the age of 3 years  Psoriatic arthritis  Diabetic neuropathy  Psoriasis  Blind: Left Eye  Cirrhosis  ANIKA treated with BiPAP  Acute promyelocytic leukemia  Osteoarthritis  Hypothyroidism  Hypertension  Hyperlipidemia  Asthma  Diabetes mellitus  S/P  section: x 6  S/P carpal tunnel release  S/P cervical spinal fusion  S/P cholecystectomy  H/O abdominal hysterectomy: secondry to Fibroids      MEDICATIONS: carvedilol 6.25 mg b.i.d., cetirizine 10 mg daily, iron supplements 325 mg daily, hydralazine 10 mg t.i.d., acetazolamide 250 mg b.i.d., omeprazole 20 mg daily, furosemide 40 mg daily, levothyroxine 112 mcg daily, tamsulosin 0.4 mg b.i.d., aspirin 81 mg daily, atorvastatin 80 mg daily, Lyrica 300 mg at bedtime and 150 mg in a.m., diphenhydramine 50 mg p.r.n. (Benadryl), NovoLog and Lantus insulin, prescription eyedrops, p.r.n. Ventolin inhaler.    FAMILY HISTORY:  FAMILY HISTORY:  Family history of coronary artery disease (Grandparent)  Family history of blood disorder (Sibling)  Family history of cancer (Sibling): Sisters - Ovarian Caner and Lung Cancer  Brothers - Lung Cancer  Family history of diabetes mellitus (Sibling)  No pertinent family history in first degree relatives      SOCIAL HISTORY: no EtOH, drugs or tobacco    ROS:  negative, All others negative    PHYSCIAL EXAM:  Vital Signs Last 24 Hrs  T(C): 36.8, Max: 36.8 (02-08 @ 14:20)  T(F): 98.2, Max: 98.2 (02-08 @ 14:20)  HR: 80 (80 - 80)  BP: 132/60 (132/60 - 132/60)  BP(mean): --  RR: 18 (18 - 18)  SpO2: 97% (97% - 97%)  I&O's Summary    GEN: NAD  HEENT: MMM, sclera anicteric  RESP: CTA bilaterally  CVS: S1S2, RRR, no JVD, no M/R/G  GI: Soft, NT, ND, BS+  EXT: no C/C/E  NEURO: AAOX3  PSYCH: Normal affect    ECG: SR, IVCD with associated repolarization abnormality      RADIOLOGY & ADDITIONAL STUDIES:    Assessment:    Patient is a  61y Female with h/o obesity, HTN, DM, CMP (EF40-45%) here with atypical CP. Symptoms likely reflux but may represent atypical angina. Agree with admission for evaluation ACS.     Plan:  1. Continue CV medications  2. Trend biomarkers, check echo  3. PPI  4. Nitro prn  5. Thank you
Vascular Attending:  Dr Tawny Pérez      HPI:  This is a 61year old female developed "hot" mid-sternal chest sensation Tu evening after going to bed, the following morning her discomfort progressed to burning sensation which radiated to her jaw for a short duration, she is unable to specify time, aggravating or alleviating factors. She has experienced a prior episode during her myocardial infarction in 2016. She is currently under neurology evaluation for ongoing intermittent tremors which last occurred yesterday. Denies: fever, chills, cough, shortness of breath, nausea, vomiting, diarrhea, weight loss.   PT follows with Dr. Luna as her PCP, she is aware that once admitted Dr. Balderas will be her attending, patient refused and request hospitalist coverage for this visit. no specific reason given. (2017 21:05)  She was noted with L heel ulcer which is followed by Dr Martinez. She had bben [facility] and received abx for wound and has been following with Dr Martinez    PAST MEDICAL & SURGICAL HISTORY:  Tuberculosis: Treated at the age of 3 years  Psoriatic arthritis  Diabetic neuropathy  Psoriasis  Blind: Left Eye  Cirrhosis  ANIKA treated with BiPAP  Acute promyelocytic leukemia  Osteoarthritis  Hypothyroidism  Hypertension  Hyperlipidemia  Asthma  Diabetes mellitus  S/P  section: x 6  S/P carpal tunnel release  S/P cervical spinal fusion  S/P cholecystectomy  H/O abdominal hysterectomy: secondry to Fibroids      REVIEW OF SYSTEMS  General: denies fevers, chills  Skin/Breast:+ dry scaly skin sec to psoriasis	  Ophthalmologic: poor vision, no double vision  Respiratory and Thorax: denies SOB or cough	  Cardiovascular:	had intermittent midepigastric chest burning, denies palps  Gastrointestinal:	midepigastric burning, denies N/V  Musculoskeletal: arthralgias	      MEDICATIONS  (STANDING):  enoxaparin Injectable 40milliGRAM(s) SubCutaneous every 24 hours  pantoprazole    Tablet 40milliGRAM(s) Oral before breakfast  aspirin enteric coated 81milliGRAM(s) Oral daily  tamsulosin 0.4milliGRAM(s) Oral at bedtime  isosorbide   mononitrate ER Tablet (IMDUR) 60milliGRAM(s) Oral daily  pregabalin 300milliGRAM(s) Oral at bedtime  insulin glargine Injectable (LANTUS) 40Unit(s) SubCutaneous at bedtime  insulin lispro Injectable (HumaLOG) 15Unit(s) SubCutaneous three times a day before meals  insulin lispro (HumaLOG) corrective regimen sliding scale  SubCutaneous Before meals and at bedtime  dextrose 5%. 1000milliLiter(s) IV Continuous <Continuous>  dextrose 50% Injectable 12.5Gram(s) IV Push once  dextrose 50% Injectable 25Gram(s) IV Push once  dextrose 50% Injectable 25Gram(s) IV Push once  atorvastatin 80milliGRAM(s) Oral at bedtime  clopidogrel Tablet 75milliGRAM(s) Oral daily  carvedilol 6.25milliGRAM(s) Oral every 12 hours  mineral oil/petrolatum Hydrophilic Ointment 1Application(s) Topical two times a day  furosemide    Tablet 40milliGRAM(s) Oral two times a day  docusate sodium 100milliGRAM(s) Oral two times a day  mometasone 220 MICROgram(s) Inhaler 1Puff(s) Inhalation daily  hydrALAZINE 10milliGRAM(s) Oral three times a day  levothyroxine 112MICROGram(s) Oral daily  pregabalin 150milliGRAM(s) Oral daily  atropine 1% Solution 1Drop(s) Left EYE two times a day  brimonidine 0.2% Ophthalmic Solution 1Drop(s) Right EYE three times a day  timolol 0.5% Solution 1Drop(s) Both EYES two times a day  collagenase Ointment 1Application(s) Topical daily  morphine  - Injectable 4milliGRAM(s) IV Push once    MEDICATIONS  (PRN):  nitroglycerin     SubLingual 0.4milliGRAM(s) SubLingual every 5 minutes PRN Chest Pain  aluminum hydroxide/magnesium hydroxide/simethicone Suspension 30milliLiter(s) Oral every 4 hours PRN Dyspepsia  dextrose Gel 1Dose(s) Oral once PRN Blood Glucose LESS THAN 70 milliGRAM(s)/deciliter  glucagon  Injectable 1milliGRAM(s) IntraMuscular once PRN Glucose LESS THAN 70 milligrams/deciliter  ALBUTerol/ipratropium for Nebulization 3milliLiter(s) Nebulizer every 6 hours PRN Shortness of Breath and/or Wheezing  morphine  - Injectable 2milliGRAM(s) IV Push every 6 hours PRN Severe Pain (7 - 10)  ondansetron Injectable 4milliGRAM(s) IV Push every 6 hours PRN Nausea and/or Vomiting      Allergies:  Demerol HCl (Hives)  iodine (Unknown)  iodine containing compounds (Anaphylaxis)  loperamide (Unknown)  phenylpiperidine derivatives (Unknown)  tramadol (Unknown)    SOCIAL HISTORY: lives alone, non smoker      Vital Signs Last 24 Hrs  T(C): 36.8, Max: 36.9 ( @ 12:12)  T(F): 98.2, Max: 98.4 ( @ 12:12)  HR: 81 (69 - 81)  BP: 112/66 (112/66 - 120/62)  BP(mean): --  RR: 16 (16 - 16)  SpO2: 96% (96% - 100%)    PHYSICAL EXAM:  Constitutional: Obese female in NAD  Extremities: BLE with trace edema, WWP. L heel ulcer with eschar, surrounding area not boggy, no drainage or odor  Pulses:   Right:                                                                          Left:  DP [ ]2+ [X ]1+ [ ]doppler                                                DP [ ]2+ [X ]1+ [ ]doppler  PT[ ]2+ [X ]1+ [ ]doppler                                                  PT [ ]2+ [X ]1+ [ ]doppler      LABS:                        11.3   6.49  )-----------( 156      ( 2017 19:28 )             35.4     2017 19:28    138    |  103    |  28.0   ----------------------------<  249    3.7     |  22.0   |  0.79     Ca    9.3        2017 19:28    TPro  7.4    /  Alb  3.4    /  TBili  0.3    /  DBili  x      /  AST  17     /  ALT  18     /  AlkPhos  121    2017 19:28    PT/INR - ( 2017 19:28 )   PT: 11.2 sec;   INR: 1.02 ratio         PTT - ( 2017 19:28 )  PTT:30.6 sec      RADIOLOGY & ADDITIONAL STUDIES    Impression and Plan:  60 y/o F who presented with CP and has had a L heel ulcer followed by podiatry. Pt states that she had a complete workup with regard to circulation for her ulcer and was told that there were no issues and it was sec to pressure. She has no symptoms to suggest claudication although he ability to ambulate any distance is questioned. She does not wish to repeat any tests and from clinical exam has palpable peripheral pulses.  Cont cuurent wound care.  Likely should get debridement of the ulcer +/- wound vac for healing  No vascular surgery interventions at this time  Will try to obtain test results from Dr Martinez office  Seen and discussed with Dr Tawny Pérez

## 2017-02-10 NOTE — CONSULT NOTE ADULT - CONSULT REASON
Atypical chest pain;  Dysphagia;  Vomiting. Morbid obesity.
CP
L heel ulcer
Left posterior heel decubital ulceration

## 2017-02-10 NOTE — PROGRESS NOTE ADULT - PROBLEM SELECTOR PLAN 1
Negative cardiac enzymes  nothing on CM  Echo no significant changes as per Cardio  Protonix  Patient states is on ASA/Plavix for CAD, never had LHC due to inability to lay flat, followed by Dr. Bates.

## 2017-02-10 NOTE — PROGRESS NOTE ADULT - ASSESSMENT
61yFemale PMHX CMP, HTN, DM admitted with atypical chest pain, has ruled out ACS. Pain likely GI and not cardiac as per Cardiology, echo results reviewed by Dr. Chambers, no significant change from prior.. Left heel necrotic foot ulcer noted. Seen by GI, for UGI and Esophagram today.

## 2017-02-10 NOTE — PROGRESS NOTE ADULT - SUBJECTIVE AND OBJECTIVE BOX
Patient is a 61y old  Female who presents with a chief complaint of chest discomfort (2017 21:05)      HPI:  This is a 61year old female developed "hot" mid-sternal chest sensation Tu evening after going to bed, the following morning her discomfort progressed to burning sensation which radiated to her jaw for a short duration,      pt still with some chest discomfort. Cardiology following.     REVIEW OF SYSTEMS:  Constitutional: No fever, weight loss or fatigue  ENMT:  No difficulty hearing, tinnitus, vertigo; No sinus or throat pain  Respiratory: No cough, wheezing, chills or hemoptysis  Cardiovascular: No chest pain, palpitations, dizziness or leg swelling  Gastrointestinal: No abdominal or epigastric pain. No nausea, vomiting or hematemesis; No diarrhea or constipation. No melena or hematochezia.  Skin: No itching, burning, rashes or lesions   Musculoskeletal: No joint pain or swelling; No muscle, back or extremity pain    PAST MEDICAL & SURGICAL HISTORY:  Tuberculosis: Treated at the age of 3 years  Psoriatic arthritis  Diabetic neuropathy  Psoriasis  Blind: Left Eye  Cirrhosis  ANIKA treated with BiPAP  Acute promyelocytic leukemia  Osteoarthritis  Hypothyroidism  Hypertension  Hyperlipidemia  Asthma  Diabetes mellitus  S/P  section: x 6  S/P carpal tunnel release  S/P cervical spinal fusion  S/P cholecystectomy  H/O abdominal hysterectomy: secondry to Fibroids      FAMILY HISTORY:  Family history of coronary artery disease (Grandparent)  Family history of blood disorder (Sibling)  Family history of cancer (Sibling): Sisters - Ovarian Caner and Lung Cancer  Brothers - Lung Cancer  Family history of diabetes mellitus (Sibling)  No pertinent family history in first degree relatives      SOCIAL HISTORY:  Smoking Status: [ ] Current, [ ] Former, [ ] Never  Pack Years:  [  ] EtOH  [  ] IVDA    MEDICATIONS:  MEDICATIONS  (STANDING):  enoxaparin Injectable 40milliGRAM(s) SubCutaneous every 24 hours  pantoprazole    Tablet 40milliGRAM(s) Oral before breakfast  aspirin enteric coated 81milliGRAM(s) Oral daily  tamsulosin 0.4milliGRAM(s) Oral at bedtime  isosorbide   mononitrate ER Tablet (IMDUR) 60milliGRAM(s) Oral daily  pregabalin 300milliGRAM(s) Oral at bedtime  insulin glargine Injectable (LANTUS) 40Unit(s) SubCutaneous at bedtime  insulin lispro Injectable (HumaLOG) 15Unit(s) SubCutaneous three times a day before meals  insulin lispro (HumaLOG) corrective regimen sliding scale  SubCutaneous Before meals and at bedtime  dextrose 5%. 1000milliLiter(s) IV Continuous <Continuous>  dextrose 50% Injectable 12.5Gram(s) IV Push once  dextrose 50% Injectable 25Gram(s) IV Push once  dextrose 50% Injectable 25Gram(s) IV Push once  atorvastatin 80milliGRAM(s) Oral at bedtime  clopidogrel Tablet 75milliGRAM(s) Oral daily  carvedilol 6.25milliGRAM(s) Oral every 12 hours  mineral oil/petrolatum Hydrophilic Ointment 1Application(s) Topical two times a day  furosemide    Tablet 40milliGRAM(s) Oral two times a day  docusate sodium 100milliGRAM(s) Oral two times a day  mometasone 220 MICROgram(s) Inhaler 1Puff(s) Inhalation daily  hydrALAZINE 10milliGRAM(s) Oral three times a day  levothyroxine 112MICROGram(s) Oral daily  pregabalin 150milliGRAM(s) Oral daily  atropine 1% Solution 1Drop(s) Left EYE two times a day  brimonidine 0.2% Ophthalmic Solution 1Drop(s) Right EYE three times a day  timolol 0.5% Solution 1Drop(s) Both EYES two times a day  collagenase Ointment 1Application(s) Topical daily    MEDICATIONS  (PRN):  nitroglycerin     SubLingual 0.4milliGRAM(s) SubLingual every 5 minutes PRN Chest Pain  aluminum hydroxide/magnesium hydroxide/simethicone Suspension 30milliLiter(s) Oral every 4 hours PRN Dyspepsia  dextrose Gel 1Dose(s) Oral once PRN Blood Glucose LESS THAN 70 milliGRAM(s)/deciliter  glucagon  Injectable 1milliGRAM(s) IntraMuscular once PRN Glucose LESS THAN 70 milligrams/deciliter  ALBUTerol/ipratropium for Nebulization 3milliLiter(s) Nebulizer every 6 hours PRN Shortness of Breath and/or Wheezing  morphine  - Injectable 2milliGRAM(s) IV Push every 6 hours PRN Severe Pain (7 - 10)  ondansetron Injectable 4milliGRAM(s) IV Push every 6 hours PRN Nausea and/or Vomiting      Allergies    Demerol HCl (Hives)  iodine (Unknown)  iodine containing compounds (Anaphylaxis)  loperamide (Unknown)  phenylpiperidine derivatives (Unknown)  tramadol (Unknown)    Intolerances        Vital Signs Last 24 Hrs  T(C): 36.8, Max: 36.9 ( @ 12:12)  T(F): 98.2, Max: 98.4 ( @ 12:12)  HR: 81 (69 - 81)  BP: 112/66 (112/66 - 120/62)  BP(mean): --  RR: 16 (16 - 16)  SpO2: 96% (96% - 100%)    I & Os for current day (as of 02-10 @ 08:53)  =============================================  IN: 240 ml / OUT: 1 ml / NET: 239 ml        PHYSICAL EXAM:    General: Well developed; well nourished; in no acute distress  HEENT: MMM, conjunctiva and sclera clear  Gastrointestinal: Soft, non-tender non-distended; Normal bowel sounds; No rebound or guarding  Extremities: Normal range of motion, No clubbing, cyanosis or edema  Neurological: Alert and oriented x3  Skin: Warm and dry. No obvious rash      LABS:                        11.3   6.49  )-----------( 156      ( 2017 19:28 )             35.4     2017 19:28    138    |  103    |  28.0   ----------------------------<  249    3.7     |  22.0   |  0.79     Ca    9.3        2017 19:28    TPro  7.4    /  Alb  3.4    /  TBili  0.3    /  DBili  x      /  AST  17     /  ALT  18     /  AlkPhos  121    / Amylase x      /Lipase x      2017 19:28              RADIOLOGY & ADDITIONAL STUDIES:

## 2017-02-10 NOTE — PROGRESS NOTE ADULT - RS GEN PE MLT RESP DETAILS PC
breath sounds equal/diminished breath sounds, L/diminished breath sounds, R/clear to auscultation bilaterally

## 2017-02-11 ENCOUNTER — TRANSCRIPTION ENCOUNTER (OUTPATIENT)
Age: 62
End: 2017-02-11

## 2017-02-11 VITALS
HEART RATE: 82 BPM | OXYGEN SATURATION: 96 % | DIASTOLIC BLOOD PRESSURE: 50 MMHG | SYSTOLIC BLOOD PRESSURE: 104 MMHG | RESPIRATION RATE: 16 BRPM | TEMPERATURE: 98 F

## 2017-02-11 LAB
ANION GAP SERPL CALC-SCNC: 14 MMOL/L — SIGNIFICANT CHANGE UP (ref 5–17)
BUN SERPL-MCNC: 33 MG/DL — HIGH (ref 8–20)
CALCIUM SERPL-MCNC: 8.6 MG/DL — SIGNIFICANT CHANGE UP (ref 8.6–10.2)
CHLORIDE SERPL-SCNC: 102 MMOL/L — SIGNIFICANT CHANGE UP (ref 98–107)
CO2 SERPL-SCNC: 25 MMOL/L — SIGNIFICANT CHANGE UP (ref 22–29)
CREAT SERPL-MCNC: 1.24 MG/DL — SIGNIFICANT CHANGE UP (ref 0.5–1.3)
GLUCOSE SERPL-MCNC: 188 MG/DL — HIGH (ref 70–115)
HCT VFR BLD CALC: 33 % — LOW (ref 37–47)
HGB BLD-MCNC: 10.2 G/DL — LOW (ref 12–16)
MAGNESIUM SERPL-MCNC: 1.8 MG/DL — SIGNIFICANT CHANGE UP (ref 1.8–2.5)
MCHC RBC-ENTMCNC: 27.6 PG — SIGNIFICANT CHANGE UP (ref 27–31)
MCHC RBC-ENTMCNC: 30.9 G/DL — LOW (ref 32–36)
MCV RBC AUTO: 89.2 FL — SIGNIFICANT CHANGE UP (ref 81–99)
PHOSPHATE SERPL-MCNC: 4.1 MG/DL — SIGNIFICANT CHANGE UP (ref 2.4–4.7)
PLATELET # BLD AUTO: 139 K/UL — LOW (ref 150–400)
POTASSIUM SERPL-MCNC: 3.7 MMOL/L — SIGNIFICANT CHANGE UP (ref 3.5–5.3)
POTASSIUM SERPL-SCNC: 3.7 MMOL/L — SIGNIFICANT CHANGE UP (ref 3.5–5.3)
RBC # BLD: 3.7 M/UL — LOW (ref 4.4–5.2)
RBC # FLD: 13.6 % — SIGNIFICANT CHANGE UP (ref 11–15.6)
SODIUM SERPL-SCNC: 141 MMOL/L — SIGNIFICANT CHANGE UP (ref 135–145)
WBC # BLD: 6.09 K/UL — SIGNIFICANT CHANGE UP (ref 4.8–10.8)
WBC # FLD AUTO: 6.09 K/UL — SIGNIFICANT CHANGE UP (ref 4.8–10.8)

## 2017-02-11 PROCEDURE — 99239 HOSP IP/OBS DSCHRG MGMT >30: CPT

## 2017-02-11 RX ORDER — HEPARIN SODIUM 5000 [USP'U]/ML
20 INJECTION INTRAVENOUS; SUBCUTANEOUS ONCE
Qty: 0 | Refills: 0 | Status: DISCONTINUED | OUTPATIENT
Start: 2017-02-11 | End: 2017-02-11

## 2017-02-11 RX ORDER — COLLAGENASE CLOSTRIDIUM HIST. 250 UNIT/G
1 OINTMENT (GRAM) TOPICAL
Qty: 1 | Refills: 0 | OUTPATIENT
Start: 2017-02-11 | End: 2017-02-26

## 2017-02-11 RX ORDER — LANOLIN/MINERAL OIL
1 LOTION (ML) TOPICAL
Qty: 0 | Refills: 0 | COMMUNITY
Start: 2017-02-11

## 2017-02-11 RX ORDER — PANTOPRAZOLE SODIUM 20 MG/1
1 TABLET, DELAYED RELEASE ORAL
Qty: 60 | Refills: 0 | OUTPATIENT
Start: 2017-02-11 | End: 2017-03-13

## 2017-02-11 RX ADMIN — Medication 10 MILLIGRAM(S): at 06:41

## 2017-02-11 RX ADMIN — ONDANSETRON 4 MILLIGRAM(S): 8 TABLET, FILM COATED ORAL at 11:44

## 2017-02-11 RX ADMIN — BRIMONIDINE TARTRATE 1 DROP(S): 2 SOLUTION/ DROPS OPHTHALMIC at 06:41

## 2017-02-11 RX ADMIN — Medication 81 MILLIGRAM(S): at 12:57

## 2017-02-11 RX ADMIN — Medication 15 UNIT(S): at 09:27

## 2017-02-11 RX ADMIN — Medication 300 UNIT(S): at 15:15

## 2017-02-11 RX ADMIN — Medication 1 DROP(S): at 06:41

## 2017-02-11 RX ADMIN — Medication 100 MILLIGRAM(S): at 06:40

## 2017-02-11 RX ADMIN — BRIMONIDINE TARTRATE 1 DROP(S): 2 SOLUTION/ DROPS OPHTHALMIC at 15:15

## 2017-02-11 RX ADMIN — Medication 112 MICROGRAM(S): at 06:39

## 2017-02-11 RX ADMIN — Medication 150 MILLIGRAM(S): at 12:57

## 2017-02-11 RX ADMIN — MORPHINE SULFATE 2 MILLIGRAM(S): 50 CAPSULE, EXTENDED RELEASE ORAL at 12:00

## 2017-02-11 RX ADMIN — Medication 15 UNIT(S): at 12:56

## 2017-02-11 RX ADMIN — CLOPIDOGREL BISULFATE 75 MILLIGRAM(S): 75 TABLET, FILM COATED ORAL at 12:57

## 2017-02-11 RX ADMIN — Medication 1 APPLICATION(S): at 14:10

## 2017-02-11 RX ADMIN — CARVEDILOL PHOSPHATE 6.25 MILLIGRAM(S): 80 CAPSULE, EXTENDED RELEASE ORAL at 06:40

## 2017-02-11 RX ADMIN — PANTOPRAZOLE SODIUM 40 MILLIGRAM(S): 20 TABLET, DELAYED RELEASE ORAL at 06:40

## 2017-02-11 RX ADMIN — Medication 1 APPLICATION(S): at 06:41

## 2017-02-11 RX ADMIN — Medication 2: at 12:56

## 2017-02-11 RX ADMIN — Medication 4: at 09:27

## 2017-02-11 RX ADMIN — MORPHINE SULFATE 2 MILLIGRAM(S): 50 CAPSULE, EXTENDED RELEASE ORAL at 11:45

## 2017-02-11 RX ADMIN — Medication 40 MILLIGRAM(S): at 06:39

## 2017-02-11 NOTE — DISCHARGE NOTE ADULT - PROVIDER TOKENS
TOKEN:'[id number]',TOKEN:'[id number]:'[id number]',FREE:[LAST:[PCP- Dr. Luna],PHONE:[(   )    -],FAX:[(   )    -]]

## 2017-02-11 NOTE — PROGRESS NOTE ADULT - PROBLEM SELECTOR PROBLEM 1
Gastroesophageal reflux disease, esophagitis presence not specified
Gastroesophageal reflux disease, esophagitis presence not specified
ACS (acute coronary syndrome)
ACS (acute coronary syndrome)

## 2017-02-11 NOTE — DISCHARGE NOTE ADULT - MEDICATION SUMMARY - MEDICATIONS TO TAKE
I will START or STAY ON the medications listed below when I get home from the hospital:    aspirin 81 mg oral tablet  -- 1 tab(s) by mouth once a day  -- Indication: For Cardiomyopathy    tamsulosin 0.4 mg oral capsule  -- 2 cap(s) by mouth once a day  -- Indication: For Home medication     Imdur 60 mg oral tablet, extended release  -- 1 tab(s) by mouth once a day  -- Do not drink alcoholic beverages when taking this medication.  It is very important that you take or use this exactly as directed.  Do not skip doses or discontinue unless directed by your doctor.  Swallow whole.  Do not crush.    -- Indication: For Cardiomyopathy    pregabalin 300 mg oral capsule  -- 1 cap(s) by mouth once a day (at bedtime)  -- Indication: For Home medication      pregabalin 150 mg oral capsule  -- 1 cap(s) by mouth once a day  -- Indication: For Home medication     insulin aspart 100 units/mL subcutaneous solution  -- 45 unit(s) subcutaneous 3 times a day  -- Indication: For Diabetes mellitus    insulin glargine 100 units/mL subcutaneous solution  -- 40 unit(s) subcutaneous once a day (at bedtime)  -- Do not drink alcoholic beverages when taking this medication.  It is very important that you take or use this exactly as directed.  Do not skip doses or discontinue unless directed by your doctor.  Keep in refrigerator.  Do not freeze.    -- Indication: For Diabetes mellitus    atorvastatin 80 mg oral tablet  -- 1 tab(s) by mouth once a day (at bedtime)  -- Indication: For Cardiomyopathy    clopidogrel 75 mg oral tablet  -- 1 tab(s) by mouth once a day  -- Indication: For Cardiomyopathy    carvedilol 6.25 mg oral tablet  -- 1 tab(s) by mouth every 12 hours  -- Indication: For Cardiomyopathy    albuterol 90 mcg/inh inhalation aerosol  -- 2 puff(s) inhaled every 6 hours, As Needed  -- Indication: For Home medication     collagenase 250 units/g topical ointment  -- 1 application on skin once a day  -- Indication: For Heel ulcer    emollients, topical ointment  -- 1 application on skin 2 times a day  -- Indication: For Heel ulcer    furosemide 40 mg oral tablet  -- 1 tab(s) by mouth once a day  -- Indication: For Cardiomyopathy    acetaZOLAMIDE 250 mg oral tablet  -- 1 tab(s) by mouth 2 times a day  -- Indication: For Home medication     brimonidine 0.1% ophthalmic solution  -- 1 drop(s) in the right eye 3 times a day  -- Indication: For Home medication     timolol hemihydrate 0.5% ophthalmic solution  -- 1 drop(s) in each eye 2 times a day  -- Indication: For Home medication     atropine 0.5% ophthalmic solution  -- 1 drop(s) in the left eye 2 times a day  -- Indication: For Home medication     pantoprazole 40 mg oral delayed release tablet  -- 1 tab(s) by mouth 2 times a day (before meals)  -- Indication: For esophagitis     mometasone 100 mcg/inh inhalation aerosol  -- 2 puff(s) inhaled once a day  -- Indication: For Home medication     levothyroxine 112 mcg (0.112 mg) oral tablet  -- 1 tab(s) by mouth once a day  -- Indication: For Hypothyroidism    hydrALAZINE 10 mg oral tablet  -- 1 tab(s) by mouth 3 times a day  -- It is very important that you take or use this exactly as directed.  Do not skip doses or discontinue unless directed by your doctor.  Some non-prescription drugs may aggravate your condition.  Read all labels carefully.  If a warning appears, check with your doctor before taking.    -- Indication: For Cardiomyopathy

## 2017-02-11 NOTE — DISCHARGE NOTE ADULT - PATIENT PORTAL LINK FT
“You can access the FollowHealth Patient Portal, offered by [facility], by registering with the following website: [email or web address]”

## 2017-02-11 NOTE — DISCHARGE NOTE ADULT - MEDICATION SUMMARY - MEDICATIONS TO CHANGE
I will SWITCH the dose or number of times a day I take the medications listed below when I get home from the hospital:    pantoprazole 40 mg oral delayed release tablet  -- 1 tab(s) by mouth 2 times a day (before meals)

## 2017-02-11 NOTE — PROGRESS NOTE ADULT - PROBLEM SELECTOR PLAN 1
Tertiary contraction on UGI. No masses. Would continue with Bid dose protonix. May D/C home from GI standpoint. F/U with Dr Shelton in 4 weeks. Benton sign off

## 2017-02-11 NOTE — PROGRESS NOTE ADULT - SUBJECTIVE AND OBJECTIVE BOX
Patient is a 61y old  Female who presents with a chief complaint of chest discomfort (2017 21:05)      HPI:  I spoke to patient this am. No chest pain or burning yesterday. + back pain. Protonix was increased to bid yesterday. UGI showed no esophageal masses or stricture, just some tertiary contractions. Stomach and duodenum not well visualized               REVIEW OF SYSTEMS:  Constitutional: No fever, weight loss or fatigue  ENMT:  No difficulty hearing, tinnitus, vertigo; No sinus or throat pain  Respiratory: No cough, wheezing, chills or hemoptysis  Cardiovascular: No chest pain, palpitations, dizziness or leg swelling  Gastrointestinal: No abdominal or epigastric pain. No nausea, vomiting or hematemesis; No diarrhea or constipation. No melena or hematochezia.  Skin: No itching, burning, rashes or lesions   Musculoskeletal: No joint pain or swelling; No muscle, back or extremity pain    PAST MEDICAL & SURGICAL HISTORY:  Tuberculosis: Treated at the age of 3 years  Psoriatic arthritis  Diabetic neuropathy  Psoriasis  Blind: Left Eye  Cirrhosis  ANIKA treated with BiPAP  Acute promyelocytic leukemia  Osteoarthritis  Hypothyroidism  Hypertension  Hyperlipidemia  Asthma  Diabetes mellitus  S/P  section: x 6  S/P carpal tunnel release  S/P cervical spinal fusion  S/P cholecystectomy  H/O abdominal hysterectomy: secondry to Fibroids      FAMILY HISTORY:  Family history of coronary artery disease (Grandparent)  Family history of blood disorder (Sibling)  Family history of cancer (Sibling): Sisters - Ovarian Caner and Lung Cancer  Brothers - Lung Cancer  Family history of diabetes mellitus (Sibling)  No pertinent family history in first degree relatives      SOCIAL HISTORY:  Smoking Status: [ ] Current, [ ] Former, [ ] Never  Pack Years:  [  ] EtOH  [  ] IVDA    MEDICATIONS:  MEDICATIONS  (STANDING):  enoxaparin Injectable 40milliGRAM(s) SubCutaneous every 24 hours  pantoprazole    Tablet 40milliGRAM(s) Oral before breakfast  aspirin enteric coated 81milliGRAM(s) Oral daily  tamsulosin 0.4milliGRAM(s) Oral at bedtime  isosorbide   mononitrate ER Tablet (IMDUR) 60milliGRAM(s) Oral daily  pregabalin 300milliGRAM(s) Oral at bedtime  insulin glargine Injectable (LANTUS) 40Unit(s) SubCutaneous at bedtime  insulin lispro Injectable (HumaLOG) 15Unit(s) SubCutaneous three times a day before meals  insulin lispro (HumaLOG) corrective regimen sliding scale  SubCutaneous Before meals and at bedtime  dextrose 5%. 1000milliLiter(s) IV Continuous <Continuous>  dextrose 50% Injectable 12.5Gram(s) IV Push once  dextrose 50% Injectable 25Gram(s) IV Push once  dextrose 50% Injectable 25Gram(s) IV Push once  atorvastatin 80milliGRAM(s) Oral at bedtime  clopidogrel Tablet 75milliGRAM(s) Oral daily  carvedilol 6.25milliGRAM(s) Oral every 12 hours  mineral oil/petrolatum Hydrophilic Ointment 1Application(s) Topical two times a day  furosemide    Tablet 40milliGRAM(s) Oral two times a day  docusate sodium 100milliGRAM(s) Oral two times a day  mometasone 220 MICROgram(s) Inhaler 1Puff(s) Inhalation daily  hydrALAZINE 10milliGRAM(s) Oral three times a day  levothyroxine 112MICROGram(s) Oral daily  pregabalin 150milliGRAM(s) Oral daily  atropine 1% Solution 1Drop(s) Left EYE two times a day  brimonidine 0.2% Ophthalmic Solution 1Drop(s) Right EYE three times a day  timolol 0.5% Solution 1Drop(s) Both EYES two times a day  collagenase Ointment 1Application(s) Topical daily    MEDICATIONS  (PRN):  nitroglycerin     SubLingual 0.4milliGRAM(s) SubLingual every 5 minutes PRN Chest Pain  aluminum hydroxide/magnesium hydroxide/simethicone Suspension 30milliLiter(s) Oral every 4 hours PRN Dyspepsia  dextrose Gel 1Dose(s) Oral once PRN Blood Glucose LESS THAN 70 milliGRAM(s)/deciliter  glucagon  Injectable 1milliGRAM(s) IntraMuscular once PRN Glucose LESS THAN 70 milligrams/deciliter  ALBUTerol/ipratropium for Nebulization 3milliLiter(s) Nebulizer every 6 hours PRN Shortness of Breath and/or Wheezing  morphine  - Injectable 2milliGRAM(s) IV Push every 6 hours PRN Severe Pain (7 - 10)  ondansetron Injectable 4milliGRAM(s) IV Push every 6 hours PRN Nausea and/or Vomiting      Allergies    Demerol HCl (Hives)  iodine (Unknown)  iodine containing compounds (Anaphylaxis)  loperamide (Unknown)  phenylpiperidine derivatives (Unknown)  tramadol (Unknown)    Intolerances        Vital Signs Last 24 Hrs  T(C): 36.8, Max: 36.8 ( @ 06:36)  T(F): 98.2, Max: 98.2 ( @ 06:36)  HR: 82 (74 - 82)  BP: 124/64 (116/62 - 124/64)  BP(mean): --  RR: 16 (15 - 16)  SpO2: 97% (97% - 100%)        PHYSICAL EXAM:    General: obese; well nourished; in no acute distress  HEENT: MMM, conjunctiva and sclera clear  Gastrointestinal: Soft, non-tender non-distended; Normal bowel sounds; No rebound or guarding  Extremities: Normal range of motion, No clubbing, cyanosis or edema  Neurological: Alert and oriented x3  Skin: Warm and dry. No obvious rash      LABS:                        10.2   6.09  )-----------( 139      ( 2017 05:12 )             33.0     2017 05:12    141    |  102    |  33.0   ----------------------------<  188    3.7     |  25.0   |  1.24     Ca    8.6        2017 05:12  Phos  4.1       2017 05:12  Mg     1.8       2017 05:12                RADIOLOGY & ADDITIONAL STUDIES:

## 2017-02-11 NOTE — DISCHARGE NOTE ADULT - CARE PROVIDER_API CALL
Tomas Chambers), Cardiovascular Disease; Internal Medicine  [address] [place] [address]  Phone: [phone]  Fax: [phone]    Faustino Hay (DP), Surgery  [address] [place] [address]  Phone: [phone]  Fax: [phone]    Tu Shelton), Gastroenterology; Internal Medicine  [address] [place] [address]  Phone: [phone]  Fax: [phone]    PCP- Dr. Luna,   Phone: (   )    -  Fax: (   )    -

## 2017-02-11 NOTE — DISCHARGE NOTE ADULT - CARE PLAN
Principal Discharge DX:	ACS (acute coronary syndrome)  Goal:	.  Instructions for follow-up, activity and diet:	negative troponin, TTE unchanged, cont. home medication, f/u cardiology  Secondary Diagnosis:	Cardiomyopathy  Instructions for follow-up, activity and diet:	cont. home medication, f/u cardiology  Secondary Diagnosis:	Diabetes mellitus  Secondary Diagnosis:	Dysphagia, unspecified type  Instructions for follow-up, activity and diet:	Increase PPI BID, f/u GI  Secondary Diagnosis:	Hyperlipidemia  Secondary Diagnosis:	Heel ulcer  Instructions for follow-up, activity and diet:	Local wound care as per Podiatry ,f/u Podiatry

## 2017-02-11 NOTE — DIETITIAN INITIAL EVALUATION ADULT. - OTHER INFO
Pt admitted for chest pain. Tolerated diet, fair-good intake. Denied nutrition supplement. Menu assistance provided (Pt partially blind). Pt denied diet education. No c/o GI distress.

## 2017-02-11 NOTE — DISCHARGE NOTE ADULT - HOSPITAL COURSE
This is a 61year old female developed "hot" mid-sternal chest sensation Tuesday evening after going to bed, the following morning her discomfort progressed to burning sensation which radiated to her jaw for a short duration, she is unable to specify time, aggravating or alleviating factors. She has experienced a prior episode during her myocardial infarction in 2016. Pt was admitted to tele floor, serial CE were negative, seen by cardiology, s/p TTE - Echo no significant changes as per Cardio. Patient states is on ASA/Plavix for CAD, never had LHC due to inability to lay flat, followed by Dr. Bates. As per cardiology her symptoms are due to GI, she is also c/o dysphagia, seen by GI, s/p UGI and esophagogram, as per GI- esophagitis presence not specified, Tertiary contraction on UGI. No masses. Would continue with Bid dose protonix. May D/C home from GI standpoint. F/U with Dr Shelton in 4 weeks.    Pt also has h/o left heel ulcer seen by Podiatry, getting local wound care with - Cleaned wound site with sterile saline flush, Santyl/DSD dressing, Ordered Santyl to be applied to the wound site daily, also seen by vascular, no further work needed, clear by Podiatry to d/c home.    Pt report feeling better, denied chest pain, SOB, abd. pain, nausea and vomiting.     ICU Vital Signs Last 24 Hrs  T(C): 36.8, Max: 36.8 (02-11 @ 06:36)  T(F): 98.3, Max: 98.3 (02-11 @ 11:55)  HR: 86 (74 - 86)  BP: 142/60 (116/62 - 142/60)  BP(mean): --  ABP: --  ABP(mean): --  RR: 18 (16 - 18)  SpO2: 96% (96% - 99%)    PHYSICAL EXAM:    GENERAL: NAD  HEAD:  Atraumatic  NERVOUS SYSTEM:  Alert & Oriented X3,   CHEST/LUNG: positive air entry   HEART: s1/s2 audible   ABDOMEN: Soft, Nontender, Nondistended; Bowel sounds present  EXTREMITIES:  left heel ulcer     Time spent 40 minutes

## 2017-02-11 NOTE — DISCHARGE NOTE ADULT - PLAN OF CARE
. negative troponin, TTE unchanged, cont. home medication, f/u cardiology cont. home medication, f/u cardiology Increase PPI BID, f/u GI Local wound care as per Podiatry ,f/u Podiatry

## 2017-03-21 ENCOUNTER — RX RENEWAL (OUTPATIENT)
Age: 62
End: 2017-03-21

## 2017-04-17 ENCOUNTER — RX RENEWAL (OUTPATIENT)
Age: 62
End: 2017-04-17

## 2017-05-18 ENCOUNTER — RX RENEWAL (OUTPATIENT)
Age: 62
End: 2017-05-18

## 2017-06-27 ENCOUNTER — OUTPATIENT (OUTPATIENT)
Dept: OUTPATIENT SERVICES | Facility: HOSPITAL | Age: 62
LOS: 1 days | End: 2017-06-27
Payer: MEDICAID

## 2017-06-27 DIAGNOSIS — Z90.49 ACQUIRED ABSENCE OF OTHER SPECIFIED PARTS OF DIGESTIVE TRACT: Chronic | ICD-10-CM

## 2017-06-27 DIAGNOSIS — Z98.89 OTHER SPECIFIED POSTPROCEDURAL STATES: Chronic | ICD-10-CM

## 2017-06-27 DIAGNOSIS — Z90.710 ACQUIRED ABSENCE OF BOTH CERVIX AND UTERUS: Chronic | ICD-10-CM

## 2017-06-27 DIAGNOSIS — R06.09 OTHER FORMS OF DYSPNEA: ICD-10-CM

## 2017-06-27 DIAGNOSIS — Z98.1 ARTHRODESIS STATUS: Chronic | ICD-10-CM

## 2017-06-27 PROCEDURE — 93017 CV STRESS TEST TRACING ONLY: CPT

## 2017-06-27 PROCEDURE — 78452 HT MUSCLE IMAGE SPECT MULT: CPT | Mod: 26

## 2017-06-27 PROCEDURE — A9500: CPT

## 2017-06-27 PROCEDURE — 93016 CV STRESS TEST SUPVJ ONLY: CPT

## 2017-06-27 PROCEDURE — 93018 CV STRESS TEST I&R ONLY: CPT

## 2017-06-27 PROCEDURE — 78452 HT MUSCLE IMAGE SPECT MULT: CPT

## 2017-07-19 ENCOUNTER — INPATIENT (INPATIENT)
Facility: HOSPITAL | Age: 62
LOS: 0 days | Discharge: HOME | DRG: 251 | End: 2017-07-20
Attending: INTERNAL MEDICINE | Admitting: INTERNAL MEDICINE
Payer: MEDICAID

## 2017-07-19 ENCOUNTER — TRANSCRIPTION ENCOUNTER (OUTPATIENT)
Age: 62
End: 2017-07-19

## 2017-07-19 VITALS
OXYGEN SATURATION: 98 % | HEART RATE: 86 BPM | DIASTOLIC BLOOD PRESSURE: 79 MMHG | SYSTOLIC BLOOD PRESSURE: 173 MMHG | RESPIRATION RATE: 27 BRPM | TEMPERATURE: 98 F

## 2017-07-19 DIAGNOSIS — Z98.1 ARTHRODESIS STATUS: Chronic | ICD-10-CM

## 2017-07-19 DIAGNOSIS — I20.9 ANGINA PECTORIS, UNSPECIFIED: ICD-10-CM

## 2017-07-19 DIAGNOSIS — Z98.89 OTHER SPECIFIED POSTPROCEDURAL STATES: Chronic | ICD-10-CM

## 2017-07-19 DIAGNOSIS — Z90.710 ACQUIRED ABSENCE OF BOTH CERVIX AND UTERUS: Chronic | ICD-10-CM

## 2017-07-19 DIAGNOSIS — Z90.49 ACQUIRED ABSENCE OF OTHER SPECIFIED PARTS OF DIGESTIVE TRACT: Chronic | ICD-10-CM

## 2017-07-19 DIAGNOSIS — R94.39 ABNORMAL RESULT OF OTHER CARDIOVASCULAR FUNCTION STUDY: ICD-10-CM

## 2017-07-19 LAB
ANION GAP SERPL CALC-SCNC: 14 MMOL/L — SIGNIFICANT CHANGE UP (ref 5–17)
BUN SERPL-MCNC: 32 MG/DL — HIGH (ref 8–20)
CALCIUM SERPL-MCNC: 8.8 MG/DL — SIGNIFICANT CHANGE UP (ref 8.6–10.2)
CHLORIDE SERPL-SCNC: 106 MMOL/L — SIGNIFICANT CHANGE UP (ref 98–107)
CO2 SERPL-SCNC: 21 MMOL/L — LOW (ref 22–29)
CREAT SERPL-MCNC: 0.89 MG/DL — SIGNIFICANT CHANGE UP (ref 0.5–1.3)
GLUCOSE SERPL-MCNC: 337 MG/DL — HIGH (ref 70–115)
HCT VFR BLD CALC: 34.8 % — LOW (ref 37–47)
HGB BLD-MCNC: 11.4 G/DL — LOW (ref 12–16)
INR BLD: 1.03 RATIO — SIGNIFICANT CHANGE UP (ref 0.88–1.16)
MCHC RBC-ENTMCNC: 29.2 PG — SIGNIFICANT CHANGE UP (ref 27–31)
MCHC RBC-ENTMCNC: 32.8 G/DL — SIGNIFICANT CHANGE UP (ref 32–36)
MCV RBC AUTO: 89 FL — SIGNIFICANT CHANGE UP (ref 81–99)
PLATELET # BLD AUTO: 145 K/UL — LOW (ref 150–400)
POTASSIUM SERPL-MCNC: 4.1 MMOL/L — SIGNIFICANT CHANGE UP (ref 3.5–5.3)
POTASSIUM SERPL-SCNC: 4.1 MMOL/L — SIGNIFICANT CHANGE UP (ref 3.5–5.3)
PROTHROM AB SERPL-ACNC: 11.3 SEC — SIGNIFICANT CHANGE UP (ref 9.8–12.7)
RBC # BLD: 3.91 M/UL — LOW (ref 4.4–5.2)
RBC # FLD: 14.8 % — SIGNIFICANT CHANGE UP (ref 11–15.6)
SODIUM SERPL-SCNC: 141 MMOL/L — SIGNIFICANT CHANGE UP (ref 135–145)
WBC # BLD: 6.8 K/UL — SIGNIFICANT CHANGE UP (ref 4.8–10.8)
WBC # FLD AUTO: 6.8 K/UL — SIGNIFICANT CHANGE UP (ref 4.8–10.8)

## 2017-07-19 PROCEDURE — 92920 PRQ TRLUML C ANGIOP 1ART&/BR: CPT | Mod: LD

## 2017-07-19 PROCEDURE — 93454 CORONARY ARTERY ANGIO S&I: CPT | Mod: 26,59

## 2017-07-19 PROCEDURE — 93010 ELECTROCARDIOGRAM REPORT: CPT

## 2017-07-19 RX ORDER — ACETAZOLAMIDE 250 MG/1
250 TABLET ORAL
Qty: 0 | Refills: 0 | Status: DISCONTINUED | OUTPATIENT
Start: 2017-07-19 | End: 2017-07-20

## 2017-07-19 RX ORDER — DEXTROSE 50 % IN WATER 50 %
1 SYRINGE (ML) INTRAVENOUS ONCE
Qty: 0 | Refills: 0 | Status: DISCONTINUED | OUTPATIENT
Start: 2017-07-19 | End: 2017-07-20

## 2017-07-19 RX ORDER — OXYCODONE HYDROCHLORIDE 5 MG/1
5 TABLET ORAL DAILY
Qty: 0 | Refills: 0 | Status: DISCONTINUED | OUTPATIENT
Start: 2017-07-19 | End: 2017-07-20

## 2017-07-19 RX ORDER — ALBUTEROL 90 UG/1
1 AEROSOL, METERED ORAL EVERY 4 HOURS
Qty: 0 | Refills: 0 | Status: DISCONTINUED | OUTPATIENT
Start: 2017-07-19 | End: 2017-07-20

## 2017-07-19 RX ORDER — SODIUM CHLORIDE 9 MG/ML
1000 INJECTION, SOLUTION INTRAVENOUS
Qty: 0 | Refills: 0 | Status: DISCONTINUED | OUTPATIENT
Start: 2017-07-19 | End: 2017-07-20

## 2017-07-19 RX ORDER — ALBUTEROL 90 UG/1
2 AEROSOL, METERED ORAL EVERY 6 HOURS
Qty: 0 | Refills: 0 | Status: DISCONTINUED | OUTPATIENT
Start: 2017-07-19 | End: 2017-07-20

## 2017-07-19 RX ORDER — FUROSEMIDE 40 MG
40 TABLET ORAL DAILY
Qty: 0 | Refills: 0 | Status: DISCONTINUED | OUTPATIENT
Start: 2017-07-19 | End: 2017-07-20

## 2017-07-19 RX ORDER — MORPHINE SULFATE 50 MG/1
2 CAPSULE, EXTENDED RELEASE ORAL ONCE
Qty: 0 | Refills: 0 | Status: DISCONTINUED | OUTPATIENT
Start: 2017-07-19 | End: 2017-07-19

## 2017-07-19 RX ORDER — LEVOTHYROXINE SODIUM 125 MCG
112 TABLET ORAL DAILY
Qty: 0 | Refills: 0 | Status: DISCONTINUED | OUTPATIENT
Start: 2017-07-19 | End: 2017-07-20

## 2017-07-19 RX ORDER — GLUCAGON INJECTION, SOLUTION 0.5 MG/.1ML
1 INJECTION, SOLUTION SUBCUTANEOUS ONCE
Qty: 0 | Refills: 0 | Status: DISCONTINUED | OUTPATIENT
Start: 2017-07-19 | End: 2017-07-20

## 2017-07-19 RX ORDER — NITROGLYCERIN 6.5 MG
1 CAPSULE, EXTENDED RELEASE ORAL
Qty: 0 | Refills: 0 | Status: DISCONTINUED | OUTPATIENT
Start: 2017-07-19 | End: 2017-07-20

## 2017-07-19 RX ORDER — ALBUTEROL 90 UG/1
2 AEROSOL, METERED ORAL
Qty: 0 | Refills: 0 | COMMUNITY

## 2017-07-19 RX ORDER — MORPHINE SULFATE 50 MG/1
2 CAPSULE, EXTENDED RELEASE ORAL EVERY 8 HOURS
Qty: 0 | Refills: 0 | Status: DISCONTINUED | OUTPATIENT
Start: 2017-07-19 | End: 2017-07-20

## 2017-07-19 RX ORDER — CARVEDILOL PHOSPHATE 80 MG/1
6.25 CAPSULE, EXTENDED RELEASE ORAL EVERY 12 HOURS
Qty: 0 | Refills: 0 | Status: DISCONTINUED | OUTPATIENT
Start: 2017-07-19 | End: 2017-07-20

## 2017-07-19 RX ORDER — INSULIN ASPART 100 [IU]/ML
45 INJECTION, SOLUTION SUBCUTANEOUS
Qty: 1 | Refills: 0 | COMMUNITY

## 2017-07-19 RX ORDER — ISOSORBIDE MONONITRATE 60 MG/1
30 TABLET, EXTENDED RELEASE ORAL DAILY
Qty: 0 | Refills: 0 | Status: DISCONTINUED | OUTPATIENT
Start: 2017-07-19 | End: 2017-07-20

## 2017-07-19 RX ORDER — INSULIN LISPRO 100/ML
VIAL (ML) SUBCUTANEOUS
Qty: 0 | Refills: 0 | Status: DISCONTINUED | OUTPATIENT
Start: 2017-07-19 | End: 2017-07-20

## 2017-07-19 RX ORDER — ASPIRIN/CALCIUM CARB/MAGNESIUM 324 MG
81 TABLET ORAL ONCE
Qty: 0 | Refills: 0 | Status: COMPLETED | OUTPATIENT
Start: 2017-07-19 | End: 2017-07-19

## 2017-07-19 RX ORDER — ATORVASTATIN CALCIUM 80 MG/1
80 TABLET, FILM COATED ORAL AT BEDTIME
Qty: 0 | Refills: 0 | Status: DISCONTINUED | OUTPATIENT
Start: 2017-07-19 | End: 2017-07-20

## 2017-07-19 RX ORDER — FERROUS SULFATE 325(65) MG
1 TABLET ORAL
Qty: 0 | Refills: 0 | COMMUNITY

## 2017-07-19 RX ORDER — INSULIN DEGLUDEC 100 U/ML
75 INJECTION, SOLUTION SUBCUTANEOUS
Qty: 0 | Refills: 0 | COMMUNITY

## 2017-07-19 RX ORDER — TAMSULOSIN HYDROCHLORIDE 0.4 MG/1
0.4 CAPSULE ORAL AT BEDTIME
Qty: 0 | Refills: 0 | Status: DISCONTINUED | OUTPATIENT
Start: 2017-07-19 | End: 2017-07-20

## 2017-07-19 RX ORDER — HYDRALAZINE HCL 50 MG
10 TABLET ORAL THREE TIMES A DAY
Qty: 0 | Refills: 0 | Status: DISCONTINUED | OUTPATIENT
Start: 2017-07-19 | End: 2017-07-20

## 2017-07-19 RX ORDER — NITROGLYCERIN 6.5 MG
1 CAPSULE, EXTENDED RELEASE ORAL ONCE
Qty: 0 | Refills: 0 | Status: COMPLETED | OUTPATIENT
Start: 2017-07-19 | End: 2017-07-19

## 2017-07-19 RX ORDER — ALBUTEROL 90 UG/1
2.5 AEROSOL, METERED ORAL EVERY 6 HOURS
Qty: 0 | Refills: 0 | Status: DISCONTINUED | OUTPATIENT
Start: 2017-07-19 | End: 2017-07-20

## 2017-07-19 RX ORDER — CLOPIDOGREL BISULFATE 75 MG/1
1 TABLET, FILM COATED ORAL
Qty: 30 | Refills: 0 | COMMUNITY

## 2017-07-19 RX ORDER — DEXTROSE 50 % IN WATER 50 %
25 SYRINGE (ML) INTRAVENOUS ONCE
Qty: 0 | Refills: 0 | Status: DISCONTINUED | OUTPATIENT
Start: 2017-07-19 | End: 2017-07-20

## 2017-07-19 RX ORDER — FERROUS SULFATE 325(65) MG
325 TABLET ORAL DAILY
Qty: 0 | Refills: 0 | Status: DISCONTINUED | OUTPATIENT
Start: 2017-07-19 | End: 2017-07-20

## 2017-07-19 RX ORDER — PANTOPRAZOLE SODIUM 20 MG/1
40 TABLET, DELAYED RELEASE ORAL
Qty: 0 | Refills: 0 | Status: DISCONTINUED | OUTPATIENT
Start: 2017-07-19 | End: 2017-07-20

## 2017-07-19 RX ORDER — DEXTROSE 50 % IN WATER 50 %
12.5 SYRINGE (ML) INTRAVENOUS ONCE
Qty: 0 | Refills: 0 | Status: DISCONTINUED | OUTPATIENT
Start: 2017-07-19 | End: 2017-07-20

## 2017-07-19 RX ORDER — MORPHINE SULFATE 50 MG/1
4 CAPSULE, EXTENDED RELEASE ORAL ONCE
Qty: 0 | Refills: 0 | Status: DISCONTINUED | OUTPATIENT
Start: 2017-07-19 | End: 2017-07-20

## 2017-07-19 RX ORDER — LORATADINE 10 MG/1
10 TABLET ORAL DAILY
Qty: 0 | Refills: 0 | Status: DISCONTINUED | OUTPATIENT
Start: 2017-07-19 | End: 2017-07-20

## 2017-07-19 RX ORDER — SODIUM CHLORIDE 9 MG/ML
1000 INJECTION INTRAMUSCULAR; INTRAVENOUS; SUBCUTANEOUS
Qty: 0 | Refills: 0 | Status: COMPLETED | OUTPATIENT
Start: 2017-07-19 | End: 2017-07-19

## 2017-07-19 RX ORDER — RANOLAZINE 500 MG/1
500 TABLET, FILM COATED, EXTENDED RELEASE ORAL
Qty: 0 | Refills: 0 | Status: DISCONTINUED | OUTPATIENT
Start: 2017-07-19 | End: 2017-07-20

## 2017-07-19 RX ORDER — DIPHENHYDRAMINE HCL 50 MG
25 CAPSULE ORAL ONCE
Qty: 0 | Refills: 0 | Status: COMPLETED | OUTPATIENT
Start: 2017-07-19 | End: 2017-07-19

## 2017-07-19 RX ORDER — CETIRIZINE HYDROCHLORIDE 10 MG/1
1 TABLET ORAL
Qty: 0 | Refills: 0 | COMMUNITY

## 2017-07-19 RX ORDER — ASPIRIN/CALCIUM CARB/MAGNESIUM 324 MG
81 TABLET ORAL DAILY
Qty: 0 | Refills: 0 | Status: DISCONTINUED | OUTPATIENT
Start: 2017-07-19 | End: 2017-07-20

## 2017-07-19 RX ADMIN — CARVEDILOL PHOSPHATE 6.25 MILLIGRAM(S): 80 CAPSULE, EXTENDED RELEASE ORAL at 17:57

## 2017-07-19 RX ADMIN — MORPHINE SULFATE 2 MILLIGRAM(S): 50 CAPSULE, EXTENDED RELEASE ORAL at 13:45

## 2017-07-19 RX ADMIN — SODIUM CHLORIDE 30 MILLILITER(S): 9 INJECTION INTRAMUSCULAR; INTRAVENOUS; SUBCUTANEOUS at 14:21

## 2017-07-19 RX ADMIN — Medication 10 MILLIGRAM(S): at 15:33

## 2017-07-19 RX ADMIN — OXYCODONE HYDROCHLORIDE 5 MILLIGRAM(S): 5 TABLET ORAL at 15:58

## 2017-07-19 RX ADMIN — Medication 1 INCH(S): at 13:15

## 2017-07-19 RX ADMIN — MORPHINE SULFATE 2 MILLIGRAM(S): 50 CAPSULE, EXTENDED RELEASE ORAL at 20:45

## 2017-07-19 RX ADMIN — ALBUTEROL 2.5 MILLIGRAM(S): 90 AEROSOL, METERED ORAL at 21:31

## 2017-07-19 RX ADMIN — TAMSULOSIN HYDROCHLORIDE 0.4 MILLIGRAM(S): 0.4 CAPSULE ORAL at 22:06

## 2017-07-19 RX ADMIN — Medication 300 MILLIGRAM(S): at 21:28

## 2017-07-19 RX ADMIN — Medication 81 MILLIGRAM(S): at 10:14

## 2017-07-19 RX ADMIN — OXYCODONE HYDROCHLORIDE 5 MILLIGRAM(S): 5 TABLET ORAL at 15:33

## 2017-07-19 RX ADMIN — Medication 1 INCH(S): at 19:13

## 2017-07-19 RX ADMIN — MORPHINE SULFATE 2 MILLIGRAM(S): 50 CAPSULE, EXTENDED RELEASE ORAL at 19:30

## 2017-07-19 RX ADMIN — ISOSORBIDE MONONITRATE 30 MILLIGRAM(S): 60 TABLET, EXTENDED RELEASE ORAL at 14:22

## 2017-07-19 RX ADMIN — Medication 8: at 17:58

## 2017-07-19 RX ADMIN — MORPHINE SULFATE 2 MILLIGRAM(S): 50 CAPSULE, EXTENDED RELEASE ORAL at 13:30

## 2017-07-19 RX ADMIN — Medication 10 MILLIGRAM(S): at 22:06

## 2017-07-19 RX ADMIN — MORPHINE SULFATE 2 MILLIGRAM(S): 50 CAPSULE, EXTENDED RELEASE ORAL at 19:15

## 2017-07-19 RX ADMIN — RANOLAZINE 500 MILLIGRAM(S): 500 TABLET, FILM COATED, EXTENDED RELEASE ORAL at 15:34

## 2017-07-19 RX ADMIN — ATORVASTATIN CALCIUM 80 MILLIGRAM(S): 80 TABLET, FILM COATED ORAL at 22:06

## 2017-07-19 RX ADMIN — Medication 20 MILLIGRAM(S): at 17:58

## 2017-07-19 RX ADMIN — Medication 25 MILLIGRAM(S): at 12:34

## 2017-07-19 RX ADMIN — MORPHINE SULFATE 2 MILLIGRAM(S): 50 CAPSULE, EXTENDED RELEASE ORAL at 21:00

## 2017-07-19 RX ADMIN — ACETAZOLAMIDE 250 MILLIGRAM(S): 250 TABLET ORAL at 17:57

## 2017-07-19 NOTE — DISCHARGE NOTE ADULT - CARE PLAN
Principal Discharge DX:	Angina, class III  Goal:	maintain optimum heart function  Instructions for follow-up, activity and diet:	as tolerated

## 2017-07-19 NOTE — DISCHARGE NOTE ADULT - PATIENT PORTAL LINK FT
“You can access the FollowHealth Patient Portal, offered by [facility], by registering with the following website: [email or web address]”

## 2017-07-19 NOTE — PROGRESS NOTE ADULT - SUBJECTIVE AND OBJECTIVE BOX
62 yo female with h/o APL (acute leukemia), asthma, htn, DM, obesity, ANIKA, neuropathy, psoriasis, HLD, OA and abnormal stress test.  Patient has had 2 cardiac caths in the past that were negative.  Patient has been c/o SOB at rest and chest pain with exertion equivalent to CCS 3 or 4.  Patient states the chest pain is relieved with rest.  Patient had abnormal stress test 6/27/17.    S/P Cath  3VD  unsuccessful PCI of LAD  post procedure EKG st changes noted inferior leads and V1-V4, ST changes also Lead I, Avl  patient with c/o CP post procedure, tolerated MS4  Added imdur and ranexa to med regime  Tolerated procedure well  Admit telemetry overnight  Check EKG and labs in the AM Possible d/c in the AM

## 2017-07-19 NOTE — ASU PATIENT PROFILE, ADULT - VISION (WITH CORRECTIVE LENSES IF THE PATIENT USUALLY WEARS THEM):
Severely impaired: cannot locate objects without hearing or touching them or patient nonresponsive./legally blind in right eye/blind in left eye

## 2017-07-19 NOTE — DISCHARGE NOTE ADULT - HOSPITAL COURSE
Assessment/Plan  unsuccessful PCI of LAD  post procedure EKG st changes noted inferior leads and V1-V4, ST changes also Lead I, Avl, improvment noted this am.    Added imdur and ranexa to med regime  Aggressive life style modifications reviewed and explained in detail  follow up with cardiology 1 week Assessment/Plan  unsuccessful PCI of LAD  post procedure EKG st changes noted inferior leads and V1-V4, ST changes also Lead I, Avl, improvment noted this am.    Added imdur and ranexa to med regime  Aggressive life style modifications reviewed and explained in detail  follow up with cardiology 1 week   patient requested nitro-d/w with MD nobles and discharge with 6 pills. Assessment/Plan  unsuccessful PCI of LAD  post procedure EKG st changes noted inferior leads and V1-V4, ST changes also Lead I, Avl, improvment noted this am.    Added imdur and ranexa to med regime  Aggressive life style modifications reviewed and explained in detail  follow up with cardiology 1 week   patient requested nitro-d/w with MD nobles and discharge with 6 pills.  Educated on use.

## 2017-07-19 NOTE — DISCHARGE NOTE ADULT - CARE PROVIDER_API CALL
Geoffrey Croft), Cardiovascular Disease; Internal Medicine; Interventional Cardiology  [address] [facility] [address]  [place] [address]  Phone: [phone]  Fax: [phone] Newton Bates), Cardiovascular Disease; Internal Medicine; Nuclear Cardiology  [address] [place] [address]  Phone: [phone]  Fax: [phone]

## 2017-07-19 NOTE — H&P PST ADULT - HISTORY OF PRESENT ILLNESS
60 yo female with h/o APL (acute leukemia), asthma, htn, DM, obesity, ANIKA, neuropathy, psoriasis, HLD, OA and abnormal stress test.  Patient has had 2 cardiac caths in the past that were negative.  Patient has been c/o SOB at rest and chest pain with exertion equivalent to CCS 3 or 4.  Patient states the chest pain is relieved with rest.  Patient had stress test 6/27/17.  < from: Nuclear Stress Test-Pharmacologic (06.27.17 @ 11:55) >    NUCLEAR FINDINGS:  Review of raw data shows: Increased bowel uptake.  LV cavity size is enlarged with stress imaging, unchanged  with rest imaging.  TID ratio 1.09. There is a large area  of moderate to severe reduction of photon uptake involving  the basal to distal inferior, basal to mid inferolateral,  and distal lateral wall on stress imaging.  Rest imaging  demonstrated partialimprovement.  There is a second ,  medium sized area of mild reduction of photon uptake  involving the entire anterior wall and apex on stress  imaging.  This perfusion defect normalizes, with the  exception of the apex which remains fixed on rest imaging.   Gated SPECT imaging demonstrates inferior hypokinesis.  Calculated LVEF = 43%.  These findings are most consistent  with mild anterior ischemia and inferior infarct with  william-infarct ischemia.    < end of copied text >

## 2017-07-19 NOTE — DISCHARGE NOTE ADULT - MEDICATION SUMMARY - MEDICATIONS TO TAKE
I will START or STAY ON the medications listed below when I get home from the hospital:    aspirin 81 mg oral tablet  -- 1 tab(s) by mouth once a day  -- Indication: For cad    OxyCONTIN  -- 5 milligram(s) by mouth once a day, As Needed  -- Indication: For pain    tamsulosin 0.4 mg oral capsule  -- 2 cap(s) by mouth once a day  -- Indication: For urgency    isosorbide mononitrate 30 mg oral tablet, extended release  -- 1 tab(s) by mouth once a day  -- Indication: For cad    ranolazine 500 mg oral tablet, extended release  -- 1 tab(s) by mouth 2 times a day  -- Indication: For cad    pregabalin 300 mg oral capsule  -- 1 cap(s) by mouth once a day (at bedtime)  -- Indication: For pain    pregabalin 150 mg oral capsule  -- 1 cap(s) by mouth once a day  -- Indication: For pain    Tresiba FlexTouch  -- 75 unit(s) subcutaneous once a day  -- Indication: For dm    cetirizine 10 mg oral tablet  -- 1 tab(s) by mouth once a day  -- Indication: For Allegies    atorvastatin 80 mg oral tablet  -- 1 tab(s) by mouth once a day (at bedtime)  -- Indication: For cad    carvedilol 6.25 mg oral tablet  -- 1 tab(s) by mouth every 12 hours  -- Indication: For htn    albuterol 90 mcg/inh inhalation aerosol  -- 2 puff(s) inhaled every 6 hours, As Needed  -- Indication: For copd    Ventolin HFA 90 mcg/inh inhalation aerosol  -- PRN for asthma  -- Indication: For copd    Proventil HFA 90 mcg/inh inhalation aerosol  --  inhaled , As Needed  -- Indication: For copd    clotrimazole topical  -- Apply on skin to affected area , As Needed  -- Indication: For rash    acetaZOLAMIDE 250 mg oral tablet  -- 1 tab(s) by mouth 2 times a day  -- Indication: For Angina, class III    furosemide 40 mg oral tablet  -- 1 tab(s) by mouth once a day  -- Indication: For Angina, class III    ferrous sulfate 325 mg (65 mg elemental iron) oral tablet  -- 1  by mouth once a day  -- Indication: For Anemia    pantoprazole 40 mg oral delayed release tablet  -- 1 tab(s) by mouth once a day  -- Indication: For gerd    levothyroxine 112 mcg (0.112 mg) oral tablet  -- 1 tab(s) by mouth once a day  -- Indication: For hypothyroid    hydrALAZINE 10 mg oral tablet  -- 1 tab(s) by mouth 3 times a day  -- It is very important that you take or use this exactly as directed.  Do not skip doses or discontinue unless directed by your doctor.  Some non-prescription drugs may aggravate your condition.  Read all labels carefully.  If a warning appears, check with your doctor before taking.    -- Indication: For htn I will START or STAY ON the medications listed below when I get home from the hospital:    aspirin 81 mg oral tablet  -- 1 tab(s) by mouth once a day  -- Indication: For cad    OxyCONTIN  -- 5 milligram(s) by mouth once a day, As Needed  -- Indication: For pain    tamsulosin 0.4 mg oral capsule  -- 2 cap(s) by mouth once a day  -- Indication: For urgency    nitroglycerin 0.4 mg sublingual tablet  -- 1 tab(s) under tongue every 5 minutes up to 3 doses daily MDD:3 tabs  -- Indication: For cad    isosorbide mononitrate 30 mg oral tablet, extended release  -- 1 tab(s) by mouth once a day  -- Indication: For Angina, class III    ranolazine 500 mg oral tablet, extended release  -- 1 tab(s) by mouth 2 times a day  -- Indication: For cad    pregabalin 300 mg oral capsule  -- 1 cap(s) by mouth once a day (at bedtime)  -- Indication: For pain    pregabalin 150 mg oral capsule  -- 1 cap(s) by mouth once a day  -- Indication: For pain    Tresiba FlexTouch  -- 75 unit(s) subcutaneous once a day  -- Indication: For dm    cetirizine 10 mg oral tablet  -- 1 tab(s) by mouth once a day  -- Indication: For Allegies    atorvastatin 80 mg oral tablet  -- 1 tab(s) by mouth once a day (at bedtime)  -- Indication: For cad    carvedilol 6.25 mg oral tablet  -- 1 tab(s) by mouth every 12 hours  -- Indication: For htn    albuterol 90 mcg/inh inhalation aerosol  -- 2 puff(s) inhaled every 6 hours, As Needed  -- Indication: For copd    Ventolin HFA 90 mcg/inh inhalation aerosol  -- PRN for asthma  -- Indication: For copd    Proventil HFA 90 mcg/inh inhalation aerosol  --  inhaled , As Needed  -- Indication: For copd    clotrimazole topical  -- Apply on skin to affected area , As Needed  -- Indication: For rash    acetaZOLAMIDE 250 mg oral tablet  -- 1 tab(s) by mouth 2 times a day  -- Indication: For Angina, class III    furosemide 40 mg oral tablet  -- 1 tab(s) by mouth once a day  -- Indication: For Angina, class III    ferrous sulfate 325 mg (65 mg elemental iron) oral tablet  -- 1  by mouth once a day  -- Indication: For Anemia    pantoprazole 40 mg oral delayed release tablet  -- 1 tab(s) by mouth once a day  -- Indication: For gerd    levothyroxine 112 mcg (0.112 mg) oral tablet  -- 1 tab(s) by mouth once a day  -- Indication: For hypothyroid    hydrALAZINE 10 mg oral tablet  -- 1 tab(s) by mouth 3 times a day  -- It is very important that you take or use this exactly as directed.  Do not skip doses or discontinue unless directed by your doctor.  Some non-prescription drugs may aggravate your condition.  Read all labels carefully.  If a warning appears, check with your doctor before taking.    -- Indication: For htn I will START or STAY ON the medications listed below when I get home from the hospital:    aspirin 81 mg oral tablet  -- 1 tab(s) by mouth once a day  -- Indication: For cad    OxyCONTIN  -- 5 milligram(s) by mouth once a day, As Needed  -- Indication: For pain    tamsulosin 0.4 mg oral capsule  -- 2 cap(s) by mouth once a day  -- Indication: For urgency    isosorbide mononitrate 30 mg oral tablet, extended release  -- 1 tab(s) by mouth once a day  -- Indication: For cad    nitroglycerin 0.4 mg sublingual tablet  -- 1 tab(s) under tongue every 5 minutes up to 3 doses daily MDD:3 tabs  -- Indication: For cad    ranolazine 500 mg oral tablet, extended release  -- 1 tab(s) by mouth 2 times a day  -- Indication: For cad    pregabalin 300 mg oral capsule  -- 1 cap(s) by mouth once a day (at bedtime)  -- Indication: For pain    pregabalin 150 mg oral capsule  -- 1 cap(s) by mouth once a day  -- Indication: For pain    Tresiba FlexTouch  -- 75 unit(s) subcutaneous once a day  -- Indication: For dm    cetirizine 10 mg oral tablet  -- 1 tab(s) by mouth once a day  -- Indication: For Allegies    atorvastatin 80 mg oral tablet  -- 1 tab(s) by mouth once a day (at bedtime)  -- Indication: For cad    carvedilol 6.25 mg oral tablet  -- 1 tab(s) by mouth every 12 hours  -- Indication: For htn    albuterol 90 mcg/inh inhalation aerosol  -- 2 puff(s) inhaled every 6 hours, As Needed  -- Indication: For copd    Ventolin HFA 90 mcg/inh inhalation aerosol  -- PRN for asthma  -- Indication: For copd    Proventil HFA 90 mcg/inh inhalation aerosol  --  inhaled , As Needed  -- Indication: For copd    clotrimazole topical  -- Apply on skin to affected area , As Needed  -- Indication: For rash    acetaZOLAMIDE 250 mg oral tablet  -- 1 tab(s) by mouth 2 times a day  -- Indication: For Angina, class III    furosemide 40 mg oral tablet  -- 1 tab(s) by mouth once a day  -- Indication: For Angina, class III    ferrous sulfate 325 mg (65 mg elemental iron) oral tablet  -- 1  by mouth once a day  -- Indication: For Anemia    pantoprazole 40 mg oral delayed release tablet  -- 1 tab(s) by mouth once a day  -- Indication: For gerd    levothyroxine 112 mcg (0.112 mg) oral tablet  -- 1 tab(s) by mouth once a day  -- Indication: For hypothyroid    hydrALAZINE 10 mg oral tablet  -- 1 tab(s) by mouth 3 times a day  -- It is very important that you take or use this exactly as directed.  Do not skip doses or discontinue unless directed by your doctor.  Some non-prescription drugs may aggravate your condition.  Read all labels carefully.  If a warning appears, check with your doctor before taking.    -- Indication: For htn

## 2017-07-20 VITALS
TEMPERATURE: 98 F | RESPIRATION RATE: 20 BRPM | HEART RATE: 52 BPM | SYSTOLIC BLOOD PRESSURE: 103 MMHG | DIASTOLIC BLOOD PRESSURE: 52 MMHG | OXYGEN SATURATION: 96 %

## 2017-07-20 LAB
ANION GAP SERPL CALC-SCNC: 15 MMOL/L — SIGNIFICANT CHANGE UP (ref 5–17)
BUN SERPL-MCNC: 40 MG/DL — HIGH (ref 8–20)
CALCIUM SERPL-MCNC: 8.4 MG/DL — LOW (ref 8.6–10.2)
CHLORIDE SERPL-SCNC: 104 MMOL/L — SIGNIFICANT CHANGE UP (ref 98–107)
CO2 SERPL-SCNC: 21 MMOL/L — LOW (ref 22–29)
CREAT SERPL-MCNC: 1.28 MG/DL — SIGNIFICANT CHANGE UP (ref 0.5–1.3)
GLUCOSE SERPL-MCNC: 397 MG/DL — HIGH (ref 70–115)
HBA1C BLD-MCNC: 8.4 % — HIGH (ref 4–5.6)
HCT VFR BLD CALC: 31.4 % — LOW (ref 37–47)
HGB BLD-MCNC: 10 G/DL — LOW (ref 12–16)
MAGNESIUM SERPL-MCNC: 2 MG/DL — SIGNIFICANT CHANGE UP (ref 1.6–2.6)
MCHC RBC-ENTMCNC: 29 PG — SIGNIFICANT CHANGE UP (ref 27–31)
MCHC RBC-ENTMCNC: 31.8 G/DL — LOW (ref 32–36)
MCV RBC AUTO: 91 FL — SIGNIFICANT CHANGE UP (ref 81–99)
PLATELET # BLD AUTO: 146 K/UL — LOW (ref 150–400)
POTASSIUM SERPL-MCNC: 4.2 MMOL/L — SIGNIFICANT CHANGE UP (ref 3.5–5.3)
POTASSIUM SERPL-SCNC: 4.2 MMOL/L — SIGNIFICANT CHANGE UP (ref 3.5–5.3)
RBC # BLD: 3.45 M/UL — LOW (ref 4.4–5.2)
RBC # FLD: 15.1 % — SIGNIFICANT CHANGE UP (ref 11–15.6)
SODIUM SERPL-SCNC: 140 MMOL/L — SIGNIFICANT CHANGE UP (ref 135–145)
WBC # BLD: 9.2 K/UL — SIGNIFICANT CHANGE UP (ref 4.8–10.8)
WBC # FLD AUTO: 9.2 K/UL — SIGNIFICANT CHANGE UP (ref 4.8–10.8)

## 2017-07-20 PROCEDURE — 93010 ELECTROCARDIOGRAM REPORT: CPT

## 2017-07-20 RX ORDER — ISOSORBIDE MONONITRATE 60 MG/1
1 TABLET, EXTENDED RELEASE ORAL
Qty: 90 | Refills: 0 | OUTPATIENT
Start: 2017-07-20

## 2017-07-20 RX ORDER — NITROGLYCERIN 6.5 MG
1 CAPSULE, EXTENDED RELEASE ORAL
Qty: 6 | Refills: 0 | OUTPATIENT
Start: 2017-07-20

## 2017-07-20 RX ORDER — INSULIN ASPART 100 [IU]/ML
45 INJECTION, SOLUTION SUBCUTANEOUS
Qty: 0 | Refills: 0 | COMMUNITY

## 2017-07-20 RX ORDER — ONDANSETRON 8 MG/1
4 TABLET, FILM COATED ORAL ONCE
Qty: 0 | Refills: 0 | Status: COMPLETED | OUTPATIENT
Start: 2017-07-20 | End: 2017-07-20

## 2017-07-20 RX ORDER — NITROGLYCERIN 6.5 MG
0.3 CAPSULE, EXTENDED RELEASE ORAL
Qty: 0 | Refills: 0 | Status: DISCONTINUED | OUTPATIENT
Start: 2017-07-20 | End: 2017-07-20

## 2017-07-20 RX ORDER — RANOLAZINE 500 MG/1
1 TABLET, FILM COATED, EXTENDED RELEASE ORAL
Qty: 90 | Refills: 0 | OUTPATIENT
Start: 2017-07-20

## 2017-07-20 RX ORDER — DIPHENHYDRAMINE HCL 50 MG
25 CAPSULE ORAL EVERY 6 HOURS
Qty: 0 | Refills: 0 | Status: DISCONTINUED | OUTPATIENT
Start: 2017-07-20 | End: 2017-07-20

## 2017-07-20 RX ADMIN — LORATADINE 10 MILLIGRAM(S): 10 TABLET ORAL at 12:37

## 2017-07-20 RX ADMIN — ALBUTEROL 2.5 MILLIGRAM(S): 90 AEROSOL, METERED ORAL at 03:23

## 2017-07-20 RX ADMIN — Medication 1 APPLICATION(S): at 05:44

## 2017-07-20 RX ADMIN — ONDANSETRON 4 MILLIGRAM(S): 8 TABLET, FILM COATED ORAL at 03:27

## 2017-07-20 RX ADMIN — Medication 10 MILLIGRAM(S): at 05:43

## 2017-07-20 RX ADMIN — PANTOPRAZOLE SODIUM 40 MILLIGRAM(S): 20 TABLET, DELAYED RELEASE ORAL at 05:42

## 2017-07-20 RX ADMIN — Medication 325 MILLIGRAM(S): at 12:37

## 2017-07-20 RX ADMIN — Medication 500 UNIT(S): at 14:37

## 2017-07-20 RX ADMIN — ISOSORBIDE MONONITRATE 30 MILLIGRAM(S): 60 TABLET, EXTENDED RELEASE ORAL at 12:37

## 2017-07-20 RX ADMIN — Medication 25 MILLIGRAM(S): at 00:57

## 2017-07-20 RX ADMIN — Medication 1 INCH(S): at 05:42

## 2017-07-20 RX ADMIN — Medication: at 12:40

## 2017-07-20 RX ADMIN — Medication 1 INCH(S): at 00:14

## 2017-07-20 RX ADMIN — RANOLAZINE 500 MILLIGRAM(S): 500 TABLET, FILM COATED, EXTENDED RELEASE ORAL at 05:42

## 2017-07-20 RX ADMIN — Medication 40 MILLIGRAM(S): at 05:45

## 2017-07-20 RX ADMIN — CARVEDILOL PHOSPHATE 6.25 MILLIGRAM(S): 80 CAPSULE, EXTENDED RELEASE ORAL at 05:44

## 2017-07-20 RX ADMIN — Medication 150 MILLIGRAM(S): at 12:37

## 2017-07-20 RX ADMIN — Medication 1 INCH(S): at 08:40

## 2017-07-20 RX ADMIN — ACETAZOLAMIDE 250 MILLIGRAM(S): 250 TABLET ORAL at 05:45

## 2017-07-20 RX ADMIN — MORPHINE SULFATE 4 MILLIGRAM(S): 50 CAPSULE, EXTENDED RELEASE ORAL at 05:45

## 2017-07-20 RX ADMIN — Medication 112 MICROGRAM(S): at 05:43

## 2017-07-20 RX ADMIN — Medication 81 MILLIGRAM(S): at 12:37

## 2017-07-20 RX ADMIN — Medication 10: at 08:41

## 2017-07-20 RX ADMIN — MORPHINE SULFATE 4 MILLIGRAM(S): 50 CAPSULE, EXTENDED RELEASE ORAL at 03:27

## 2017-07-20 NOTE — PROGRESS NOTE ADULT - SUBJECTIVE AND OBJECTIVE BOX
HPI:  60 yo female with h/o APL (acute leukemia), asthma, htn, DM, obesity, ANIKA, neuropathy, psoriasis, HLD, OA and abnormal stress test.  Patient has had 2 cardiac caths in the past that were negative.  Patient has been c/o SOB at rest and chest pain with exertion equivalent to CCS 3 or 4.  Patient states the chest pain is relieved with rest.  Patient had stress test 17.  < from: Nuclear Stress Test-Pharmacologic.  Underwent cardiac cath     LH, Centricity pending  S/P Cath  3VD  unsuccessful PCI of LAD    NUCLEAR FINDINGS:  Review of raw data shows: Increased bowel uptake.  LV cavity size is enlarged with stress imaging, unchanged  with rest imaging.  TID ratio 1.09. There is a large area  of moderate to severe reduction of photon uptake involving  the basal to distal inferior, basal to mid inferolateral,  and distal lateral wall on stress imaging.  Rest imaging  demonstrated partialimprovement.  There is a second ,  medium sized area of mild reduction of photon uptake  involving the entire anterior wall and apex on stress  imaging.  This perfusion defect normalizes, with the  exception of the apex which remains fixed on rest imaging.   Gated SPECT imaging demonstrates inferior hypokinesis.  Calculated LVEF = 43%.  These findings are most consistent  with mild anterior ischemia and inferior infarct with  william-infarct ischemia.    < end of copied text > (2017 10:02)    PMH  Other nonspecific abnormal cardiovascular system function study  H/o or current diagnosis of HF- no contraindication to ACEI/ARBs  Family history of coronary artery disease (Grandparent)  Family history of blood disorder (Sibling)  Family history of cancer (Sibling)  Family history of diabetes mellitus (Sibling)  No pertinent family history in first degree relatives  Handoff  MEWS Score  Tuberculosis  Psoriatic arthritis  Diabetic neuropathy  Psoriasis  Blind  Cirrhosis  ANIKA treated with BiPAP  Acute promyelocytic leukemia  Osteoarthritis  Hypothyroidism  Hypertension  Hyperlipidemia  Asthma  Diabetes mellitus  Angina, class III  S/P  section  S/P carpal tunnel release  S/P cervical spinal fusion  S/P cholecystectomy  H/O abdominal hysterectomy      REVIEW OF SYSTEMS  General: Denies fever, chills, pain, no discomfort  Respiratory and Thorax:  Denies cough, sob, or any discomfort  Cardiovascular:  Denies chest pain, palpitations or any discomfort	  Gastrointestinal:  Denies n/v/d, constipation, or any discomfort  Genitourinary:  Denies frequency, burning, or pain  Musculoskeletal:  Denies joint pain, swelling, or any discomfort   Neurological:  Denies headache, dizziness blurred vision, numbing or tingling/ + for hearing loss, follows with neuro.    Hematology  Andrew bleeding o swelling    MEDICATIONS:  isosorbide   mononitrate ER Tablet (IMDUR) 30 milliGRAM(s) Oral daily  tamsulosin 0.4 milliGRAM(s) Oral at bedtime  carvedilol 6.25 milliGRAM(s) Oral every 12 hours  acetazolamide    Tablet 250 milliGRAM(s) Oral two times a day  furosemide    Tablet 40 milliGRAM(s) Oral daily  hydrALAZINE 10 milliGRAM(s) Oral three times a day  ranolazine 500 milliGRAM(s) Oral two times a day  nitroglycerin    2% Ointment 1 Inch(s) Transdermal four times a day  loratadine 10 milliGRAM(s) Oral daily  ALBUTerol    90 MICROgram(s) HFA Inhaler 2 Puff(s) Inhalation every 6 hours PRN  ALBUTerol    0.083% 2.5 milliGRAM(s) Nebulizer every 6 hours  ALBUTerol    90 MICROgram(s) HFA Inhaler 1 Puff(s) Inhalation every 4 hours  oxyCODONE    IR 5 milliGRAM(s) Oral daily PRN  pregabalin 150 milliGRAM(s) Oral daily  pregabalin 300 milliGRAM(s) Oral at bedtime  morphine  - Injectable 2 milliGRAM(s) IV Push every 8 hours PRN  diphenhydrAMINE   Capsule 25 milliGRAM(s) Oral every 6 hours PRN  pantoprazole    Tablet 40 milliGRAM(s) Oral before breakfast  atorvastatin 80 milliGRAM(s) Oral at bedtime  levothyroxine 112 MICROGram(s) Oral daily  insulin lispro (HumaLOG) corrective regimen sliding scale   SubCutaneous three times a day before meals  dextrose Gel 1 Dose(s) Oral once PRN  dextrose 50% Injectable 12.5 Gram(s) IV Push once  dextrose 50% Injectable 25 Gram(s) IV Push once  dextrose 50% Injectable 25 Gram(s) IV Push once  glucagon  Injectable 1 milliGRAM(s) IntraMuscular once PRN  aspirin enteric coated 81 milliGRAM(s) Oral daily  clotrimazole 1% Cream 1 Application(s) Topical two times a day  ferrous    sulfate 325 milliGRAM(s) Oral daily  dextrose 5%. 1000 milliLiter(s) IV Continuous <Continuous>        PHYSICAL EXAM:  T(C): 36.4 (17 @ 08:00), Max: 36.8 (17 @ 05:34)  HR: 89 (17 @ 08:00) (78 - 91)  BP: 114/56 (17 @ 05:34) (114/55 - 153/73)  RR: 20 (17 @ 05:34) (12 - 25)  SpO2: 98% (17 @ 05:34) (92% - 100%)  Daily Weight in k.3 (2017 06:22)  Appearance: Normal, obese wf in nad.  	  HEENT:   Normal oral mucosa, PERRL, EOMI	  Lymphatic: No lymphadenopathy  Cardiovascular: Normal S1 S2, No JVD, No murmurs, No edema  Respiratory: Lungs clear to auscultation	  Psychiatry: A & O x 3, Mood & affect appropriate  Gastrointestinal:  Soft, Non-tender, + BS	  Skin: No rashes, No ecchymoses, No cyanosis  Neurologic: Non-focal  Extremities: Normal range of motion, No clubbing, cyanosis or edema  Vascular: Peripheral pulses palpable 2+ bilaterally  Procedure Site:  right radial, no bleeding. slight bruising, no pain, no hematoma, +pp, no edema.        TELEMETRY: 	    Sr, no ectopy or over night events.    ECG:  T wave abnormality and possible anterior infarct and possible lateral subendocardial injury.  Improvment in ST elevations noted from last night.                          10.0   9.2   )-----------( 146      ( 2017 05:00 )             31.4         140  |  104  |  40.0<H>  ----------------------------<  397<H>  4.2   |  21.0<L>  |  1.28    Ca    8.4<L>      2017 05:00  Mg     2.0       HgA1c: Hemoglobin A1C, Whole Blood: 8.4 % ( @ 05:00)    Assessment/Plan  unsuccessful PCI of LAD  post procedure EKG st changes noted inferior leads and V1-V4, ST changes also Lead I, Avl, improvment noted this am.    Added imdur and ranexa to med regime  Aggressive life style modifications reviewed and explained in detail  follow up with cardiology 1 week.  Hold metformin x 48 hours HPI:  62 yo female with h/o APL (acute leukemia), asthma, htn, DM, obesity, ANIKA, neuropathy, psoriasis, HLD, OA and abnormal stress test.  Patient has had 2 cardiac caths in the past that were negative.  Patient has been c/o SOB at rest and chest pain with exertion equivalent to CCS 3 or 4.  Patient states the chest pain is relieved with rest.  Patient had stress test 17.  < from: Nuclear Stress Test-Pharmacologic.  Underwent cardiac cath     LH, Centricity pending  S/P Cath  3VD  unsuccessful PCI of LAD    NUCLEAR FINDINGS:  Review of raw data shows: Increased bowel uptake.  LV cavity size is enlarged with stress imaging, unchanged  with rest imaging.  TID ratio 1.09. There is a large area  of moderate to severe reduction of photon uptake involving  the basal to distal inferior, basal to mid inferolateral,  and distal lateral wall on stress imaging.  Rest imaging  demonstrated partialimprovement.  There is a second ,  medium sized area of mild reduction of photon uptake  involving the entire anterior wall and apex on stress  imaging.  This perfusion defect normalizes, with the  exception of the apex which remains fixed on rest imaging.   Gated SPECT imaging demonstrates inferior hypokinesis.  Calculated LVEF = 43%.  These findings are most consistent  with mild anterior ischemia and inferior infarct with  william-infarct ischemia.    < end of copied text > (2017 10:02)    PMH  Other nonspecific abnormal cardiovascular system function study  H/o or current diagnosis of HF- no contraindication to ACEI/ARBs  Family history of coronary artery disease (Grandparent)  Family history of blood disorder (Sibling)  Family history of cancer (Sibling)  Family history of diabetes mellitus (Sibling)  No pertinent family history in first degree relatives  Handoff  MEWS Score  Tuberculosis  Psoriatic arthritis  Diabetic neuropathy  Psoriasis  Blind  Cirrhosis  ANIKA treated with BiPAP  Acute promyelocytic leukemia  Osteoarthritis  Hypothyroidism  Hypertension  Hyperlipidemia  Asthma  Diabetes mellitus  Angina, class III  S/P  section  S/P carpal tunnel release  S/P cervical spinal fusion  S/P cholecystectomy  H/O abdominal hysterectomy      REVIEW OF SYSTEMS  General: Denies fever, chills, pain, no discomfort  Respiratory and Thorax:  Denies cough, sob, or any discomfort  Cardiovascular:  Denies chest pain, palpitations or any discomfort	  Gastrointestinal:  Denies n/v/d, constipation, or any discomfort  Genitourinary:  Denies frequency, burning, or pain  Musculoskeletal:  Denies joint pain, swelling, or any discomfort   Neurological:  Denies headache, dizziness blurred vision, numbing or tingling/ + for hearing loss, follows with neuro.    Hematology  Andrew bleeding o swelling    MEDICATIONS:  isosorbide   mononitrate ER Tablet (IMDUR) 30 milliGRAM(s) Oral daily  tamsulosin 0.4 milliGRAM(s) Oral at bedtime  carvedilol 6.25 milliGRAM(s) Oral every 12 hours  acetazolamide    Tablet 250 milliGRAM(s) Oral two times a day  furosemide    Tablet 40 milliGRAM(s) Oral daily  hydrALAZINE 10 milliGRAM(s) Oral three times a day  ranolazine 500 milliGRAM(s) Oral two times a day  nitroglycerin    2% Ointment 1 Inch(s) Transdermal four times a day  loratadine 10 milliGRAM(s) Oral daily  ALBUTerol    90 MICROgram(s) HFA Inhaler 2 Puff(s) Inhalation every 6 hours PRN  ALBUTerol    0.083% 2.5 milliGRAM(s) Nebulizer every 6 hours  ALBUTerol    90 MICROgram(s) HFA Inhaler 1 Puff(s) Inhalation every 4 hours  oxyCODONE    IR 5 milliGRAM(s) Oral daily PRN  pregabalin 150 milliGRAM(s) Oral daily  pregabalin 300 milliGRAM(s) Oral at bedtime  morphine  - Injectable 2 milliGRAM(s) IV Push every 8 hours PRN  diphenhydrAMINE   Capsule 25 milliGRAM(s) Oral every 6 hours PRN  pantoprazole    Tablet 40 milliGRAM(s) Oral before breakfast  atorvastatin 80 milliGRAM(s) Oral at bedtime  levothyroxine 112 MICROGram(s) Oral daily  insulin lispro (HumaLOG) corrective regimen sliding scale   SubCutaneous three times a day before meals  dextrose Gel 1 Dose(s) Oral once PRN  dextrose 50% Injectable 12.5 Gram(s) IV Push once  dextrose 50% Injectable 25 Gram(s) IV Push once  dextrose 50% Injectable 25 Gram(s) IV Push once  glucagon  Injectable 1 milliGRAM(s) IntraMuscular once PRN  aspirin enteric coated 81 milliGRAM(s) Oral daily  clotrimazole 1% Cream 1 Application(s) Topical two times a day  ferrous    sulfate 325 milliGRAM(s) Oral daily  dextrose 5%. 1000 milliLiter(s) IV Continuous <Continuous>        PHYSICAL EXAM:  T(C): 36.4 (17 @ 08:00), Max: 36.8 (17 @ 05:34)  HR: 89 (17 @ 08:00) (78 - 91)  BP: 114/56 (17 @ 05:34) (114/55 - 153/73)  RR: 20 (17 @ 05:34) (12 - 25)  SpO2: 98% (17 @ 05:34) (92% - 100%)  Daily Weight in k.3 (2017 06:22)  Appearance: Normal, obese wf in nad.  	  HEENT:   Normal oral mucosa, PERRL, EOMI	  Lymphatic: No lymphadenopathy  Cardiovascular: Normal S1 S2, No JVD, No murmurs, No edema  Respiratory: Lungs clear to auscultation	  Psychiatry: A & O x 3, Mood & affect appropriate  Gastrointestinal:  Soft, Non-tender, + BS	  Skin: No rashes, No ecchymoses, No cyanosis  Neurologic: Non-focal  Extremities: Normal range of motion, No clubbing, cyanosis or edema  Vascular: Peripheral pulses palpable 2+ bilaterally  Procedure Site:  right radial, no bleeding. slight bruising, no pain, no hematoma, +pp, no edema.        TELEMETRY: 	    Sr, no ectopy or over night events.    ECG:  T wave abnormality and possible anterior infarct and possible lateral subendocardial injury.  Improvment in ST elevations noted from last night.                          10.0   9.2   )-----------( 146      ( 2017 05:00 )             31.4         140  |  104  |  40.0<H>  ----------------------------<  397<H>  4.2   |  21.0<L>  |  1.28    Ca    8.4<L>      2017 05:00  Mg     2.0       HgA1c: Hemoglobin A1C, Whole Blood: 8.4 % ( @ 05:00)    Assessment/Plan  unsuccessful PCI of LAD  post procedure EKG st changes noted inferior leads and V1-V4, ST changes also Lead I, Avl, improvment noted this am.    Added imdur and ranexa to med regime  Aggressive life style modifications reviewed and explained in detail  follow up with cardiology 1 week.

## 2017-07-23 PROCEDURE — 74241: CPT

## 2017-07-23 PROCEDURE — 83735 ASSAY OF MAGNESIUM: CPT

## 2017-07-23 PROCEDURE — 85610 PROTHROMBIN TIME: CPT

## 2017-07-23 PROCEDURE — 83036 HEMOGLOBIN GLYCOSYLATED A1C: CPT

## 2017-07-23 PROCEDURE — 36415 COLL VENOUS BLD VENIPUNCTURE: CPT

## 2017-07-23 PROCEDURE — G0378: CPT

## 2017-07-23 PROCEDURE — 96374 THER/PROPH/DIAG INJ IV PUSH: CPT

## 2017-07-23 PROCEDURE — 74220 X-RAY XM ESOPHAGUS 1CNTRST: CPT

## 2017-07-23 PROCEDURE — 71045 X-RAY EXAM CHEST 1 VIEW: CPT

## 2017-07-23 PROCEDURE — 85027 COMPLETE CBC AUTOMATED: CPT

## 2017-07-23 PROCEDURE — 80053 COMPREHEN METABOLIC PANEL: CPT

## 2017-07-23 PROCEDURE — 73630 X-RAY EXAM OF FOOT: CPT

## 2017-07-23 PROCEDURE — 93306 TTE W/DOPPLER COMPLETE: CPT

## 2017-07-23 PROCEDURE — 99285 EMERGENCY DEPT VISIT HI MDM: CPT | Mod: 25

## 2017-07-23 PROCEDURE — 85730 THROMBOPLASTIN TIME PARTIAL: CPT

## 2017-07-23 PROCEDURE — 80048 BASIC METABOLIC PNL TOTAL CA: CPT

## 2017-07-23 PROCEDURE — 83880 ASSAY OF NATRIURETIC PEPTIDE: CPT

## 2017-07-23 PROCEDURE — 93005 ELECTROCARDIOGRAM TRACING: CPT

## 2017-07-23 PROCEDURE — 94760 N-INVAS EAR/PLS OXIMETRY 1: CPT

## 2017-07-23 PROCEDURE — 82550 ASSAY OF CK (CPK): CPT

## 2017-07-23 PROCEDURE — 84484 ASSAY OF TROPONIN QUANT: CPT

## 2017-07-23 PROCEDURE — 84100 ASSAY OF PHOSPHORUS: CPT

## 2017-08-16 ENCOUNTER — RECORD ABSTRACTING (OUTPATIENT)
Age: 62
End: 2017-08-16

## 2017-08-16 DIAGNOSIS — I42.9 CARDIOMYOPATHY, UNSPECIFIED: ICD-10-CM

## 2017-08-16 DIAGNOSIS — E78.5 HYPERLIPIDEMIA, UNSPECIFIED: ICD-10-CM

## 2017-08-16 DIAGNOSIS — I20.9 ANGINA PECTORIS, UNSPECIFIED: ICD-10-CM

## 2017-08-16 DIAGNOSIS — L97.409 NON-PRESSURE CHRONIC ULCER OF UNSPECIFIED HEEL AND MIDFOOT WITH UNSPECIFIED SEVERITY: ICD-10-CM

## 2017-08-16 DIAGNOSIS — N28.9 DISORDER OF KIDNEY AND URETER, UNSPECIFIED: ICD-10-CM

## 2017-08-16 DIAGNOSIS — I25.10 ATHEROSCLEROTIC HEART DISEASE OF NATIVE CORONARY ARTERY W/OUT ANGINA PECTORIS: ICD-10-CM

## 2017-08-30 ENCOUNTER — APPOINTMENT (OUTPATIENT)
Dept: CARDIOTHORACIC SURGERY | Facility: CLINIC | Age: 62
End: 2017-08-30

## 2017-11-12 ENCOUNTER — INPATIENT (INPATIENT)
Facility: HOSPITAL | Age: 62
LOS: 2 days | Discharge: HOME | DRG: 291 | End: 2017-11-15
Attending: HOSPITALIST | Admitting: HOSPITALIST
Payer: MEDICAID

## 2017-11-12 VITALS
WEIGHT: 251.11 LBS | TEMPERATURE: 98 F | OXYGEN SATURATION: 71 % | HEART RATE: 81 BPM | SYSTOLIC BLOOD PRESSURE: 180 MMHG | HEIGHT: 61 IN | DIASTOLIC BLOOD PRESSURE: 81 MMHG | RESPIRATION RATE: 30 BRPM

## 2017-11-12 DIAGNOSIS — Z98.1 ARTHRODESIS STATUS: Chronic | ICD-10-CM

## 2017-11-12 DIAGNOSIS — Z98.89 OTHER SPECIFIED POSTPROCEDURAL STATES: Chronic | ICD-10-CM

## 2017-11-12 DIAGNOSIS — Z90.710 ACQUIRED ABSENCE OF BOTH CERVIX AND UTERUS: Chronic | ICD-10-CM

## 2017-11-12 DIAGNOSIS — Z90.49 ACQUIRED ABSENCE OF OTHER SPECIFIED PARTS OF DIGESTIVE TRACT: Chronic | ICD-10-CM

## 2017-11-12 LAB
ALBUMIN SERPL ELPH-MCNC: 3.7 G/DL — SIGNIFICANT CHANGE UP (ref 3.3–5.2)
ALP SERPL-CCNC: 118 U/L — SIGNIFICANT CHANGE UP (ref 40–120)
ALT FLD-CCNC: 12 U/L — SIGNIFICANT CHANGE UP
ANION GAP SERPL CALC-SCNC: 12 MMOL/L — SIGNIFICANT CHANGE UP (ref 5–17)
APTT BLD: 29.2 SEC — SIGNIFICANT CHANGE UP (ref 27.5–37.4)
AST SERPL-CCNC: 13 U/L — SIGNIFICANT CHANGE UP
BASE EXCESS BLDA CALC-SCNC: -3.7 MMOL/L — LOW (ref -3–3)
BASOPHILS # BLD AUTO: 0 K/UL — SIGNIFICANT CHANGE UP (ref 0–0.2)
BASOPHILS NFR BLD AUTO: 0.1 % — SIGNIFICANT CHANGE UP (ref 0–2)
BILIRUB SERPL-MCNC: 0.3 MG/DL — LOW (ref 0.4–2)
BLOOD GAS COMMENTS ARTERIAL: SIGNIFICANT CHANGE UP
BUN SERPL-MCNC: 27 MG/DL — HIGH (ref 8–20)
CALCIUM SERPL-MCNC: 8.8 MG/DL — SIGNIFICANT CHANGE UP (ref 8.6–10.2)
CHLORIDE SERPL-SCNC: 105 MMOL/L — SIGNIFICANT CHANGE UP (ref 98–107)
CO2 SERPL-SCNC: 22 MMOL/L — SIGNIFICANT CHANGE UP (ref 22–29)
CREAT SERPL-MCNC: 1.24 MG/DL — SIGNIFICANT CHANGE UP (ref 0.5–1.3)
EOSINOPHIL # BLD AUTO: 0.2 K/UL — SIGNIFICANT CHANGE UP (ref 0–0.5)
EOSINOPHIL NFR BLD AUTO: 3.2 % — SIGNIFICANT CHANGE UP (ref 0–6)
GAS PNL BLDA: SIGNIFICANT CHANGE UP
GLUCOSE SERPL-MCNC: 282 MG/DL — HIGH (ref 70–115)
HCO3 BLDA-SCNC: 21 MMOL/L — SIGNIFICANT CHANGE UP (ref 20–26)
HCT VFR BLD CALC: 32.7 % — LOW (ref 37–47)
HGB BLD-MCNC: 10.2 G/DL — LOW (ref 12–16)
HOROWITZ INDEX BLDA+IHG-RTO: SIGNIFICANT CHANGE UP
INR BLD: 1.09 RATIO — SIGNIFICANT CHANGE UP (ref 0.88–1.16)
LYMPHOCYTES # BLD AUTO: 0.9 K/UL — LOW (ref 1–4.8)
LYMPHOCYTES # BLD AUTO: 13.3 % — LOW (ref 20–55)
MCHC RBC-ENTMCNC: 30.4 PG — SIGNIFICANT CHANGE UP (ref 27–31)
MCHC RBC-ENTMCNC: 31.2 G/DL — LOW (ref 32–36)
MCV RBC AUTO: 97.6 FL — SIGNIFICANT CHANGE UP (ref 81–99)
MONOCYTES # BLD AUTO: 0.4 K/UL — SIGNIFICANT CHANGE UP (ref 0–0.8)
MONOCYTES NFR BLD AUTO: 5 % — SIGNIFICANT CHANGE UP (ref 3–10)
NEUTROPHILS # BLD AUTO: 5.4 K/UL — SIGNIFICANT CHANGE UP (ref 1.8–8)
NEUTROPHILS NFR BLD AUTO: 77.8 % — HIGH (ref 37–73)
NT-PROBNP SERPL-SCNC: 3701 PG/ML — HIGH (ref 0–300)
PCO2 BLDA: 48 MMHG — HIGH (ref 35–45)
PH BLDA: 7.29 — LOW (ref 7.35–7.45)
PLATELET # BLD AUTO: 151 K/UL — SIGNIFICANT CHANGE UP (ref 150–400)
PO2 BLDA: 159 MMHG — HIGH (ref 83–108)
POTASSIUM SERPL-MCNC: 4.3 MMOL/L — SIGNIFICANT CHANGE UP (ref 3.5–5.3)
POTASSIUM SERPL-SCNC: 4.3 MMOL/L — SIGNIFICANT CHANGE UP (ref 3.5–5.3)
PROT SERPL-MCNC: 7.2 G/DL — SIGNIFICANT CHANGE UP (ref 6.6–8.7)
PROTHROM AB SERPL-ACNC: 12 SEC — SIGNIFICANT CHANGE UP (ref 9.8–12.7)
RBC # BLD: 3.35 M/UL — LOW (ref 4.4–5.2)
RBC # FLD: 13.3 % — SIGNIFICANT CHANGE UP (ref 11–15.6)
SAO2 % BLDA: 97 % — SIGNIFICANT CHANGE UP (ref 95–99)
SODIUM SERPL-SCNC: 139 MMOL/L — SIGNIFICANT CHANGE UP (ref 135–145)
TROPONIN T SERPL-MCNC: <0.01 NG/ML — SIGNIFICANT CHANGE UP (ref 0–0.06)
WBC # BLD: 7 K/UL — SIGNIFICANT CHANGE UP (ref 4.8–10.8)
WBC # FLD AUTO: 7 K/UL — SIGNIFICANT CHANGE UP (ref 4.8–10.8)

## 2017-11-12 RX ORDER — SODIUM CHLORIDE 9 MG/ML
3 INJECTION INTRAMUSCULAR; INTRAVENOUS; SUBCUTANEOUS ONCE
Qty: 0 | Refills: 0 | Status: COMPLETED | OUTPATIENT
Start: 2017-11-12 | End: 2017-11-12

## 2017-11-12 RX ORDER — IPRATROPIUM/ALBUTEROL SULFATE 18-103MCG
3 AEROSOL WITH ADAPTER (GRAM) INHALATION ONCE
Qty: 0 | Refills: 0 | Status: COMPLETED | OUTPATIENT
Start: 2017-11-12 | End: 2017-11-12

## 2017-11-12 RX ORDER — FUROSEMIDE 40 MG
40 TABLET ORAL ONCE
Qty: 0 | Refills: 0 | Status: COMPLETED | OUTPATIENT
Start: 2017-11-12 | End: 2017-11-12

## 2017-11-12 RX ADMIN — Medication 125 MILLIGRAM(S): at 22:45

## 2017-11-12 RX ADMIN — SODIUM CHLORIDE 3 MILLILITER(S): 9 INJECTION INTRAMUSCULAR; INTRAVENOUS; SUBCUTANEOUS at 22:45

## 2017-11-12 RX ADMIN — Medication 3 MILLILITER(S): at 23:00

## 2017-11-12 RX ADMIN — Medication 40 MILLIGRAM(S): at 22:45

## 2017-11-12 NOTE — ED ADULT TRIAGE NOTE - CHIEF COMPLAINT QUOTE
pt biba from home, c/o sudden chest pain and sob x 1 week, with difficulty the worse today, pt 71 % on RA, MD davila and code team called to bedside

## 2017-11-12 NOTE — ED PROVIDER NOTE - CRITICAL CARE PROVIDED
interpretation of diagnostic studies/consultation with other physicians/documentation/direct patient care (not related to procedure)/additional history taking

## 2017-11-12 NOTE — ED PROVIDER NOTE - CARE PLAN
Principal Discharge DX:	Dyspnea  Secondary Diagnosis:	Asthma  Secondary Diagnosis:	CHF (congestive heart failure)

## 2017-11-12 NOTE — ED PROVIDER NOTE - OBJECTIVE STATEMENT
63 y/o F pt with hx of HTN, HLD, Diabetes, ANIKA(BiPap), asthma presents to ED c/o sudden onset SOB today. Pt's had productive cough and intermittent chills for a few days. denies O2 at home. Denies fevers, chest pain, nausea, vomiting, urinary symptoms.  No further complaints at this time.   PMD: Masonot

## 2017-11-13 DIAGNOSIS — L40.50 ARTHROPATHIC PSORIASIS, UNSPECIFIED: ICD-10-CM

## 2017-11-13 DIAGNOSIS — I10 ESSENTIAL (PRIMARY) HYPERTENSION: ICD-10-CM

## 2017-11-13 DIAGNOSIS — I50.23 ACUTE ON CHRONIC SYSTOLIC (CONGESTIVE) HEART FAILURE: ICD-10-CM

## 2017-11-13 DIAGNOSIS — K21.9 GASTRO-ESOPHAGEAL REFLUX DISEASE WITHOUT ESOPHAGITIS: ICD-10-CM

## 2017-11-13 DIAGNOSIS — E11.59 TYPE 2 DIABETES MELLITUS WITH OTHER CIRCULATORY COMPLICATIONS: ICD-10-CM

## 2017-11-13 DIAGNOSIS — R06.00 DYSPNEA, UNSPECIFIED: ICD-10-CM

## 2017-11-13 DIAGNOSIS — E11.42 TYPE 2 DIABETES MELLITUS WITH DIABETIC POLYNEUROPATHY: ICD-10-CM

## 2017-11-13 DIAGNOSIS — I25.118 ATHEROSCLEROTIC HEART DISEASE OF NATIVE CORONARY ARTERY WITH OTHER FORMS OF ANGINA PECTORIS: ICD-10-CM

## 2017-11-13 DIAGNOSIS — I50.9 HEART FAILURE, UNSPECIFIED: ICD-10-CM

## 2017-11-13 DIAGNOSIS — J96.02 ACUTE RESPIRATORY FAILURE WITH HYPERCAPNIA: ICD-10-CM

## 2017-11-13 DIAGNOSIS — J45.909 UNSPECIFIED ASTHMA, UNCOMPLICATED: ICD-10-CM

## 2017-11-13 DIAGNOSIS — E78.5 HYPERLIPIDEMIA, UNSPECIFIED: ICD-10-CM

## 2017-11-13 DIAGNOSIS — G47.33 OBSTRUCTIVE SLEEP APNEA (ADULT) (PEDIATRIC): ICD-10-CM

## 2017-11-13 LAB
GLUCOSE BLDC GLUCOMTR-MCNC: 185 MG/DL — HIGH (ref 70–99)
GLUCOSE BLDC GLUCOMTR-MCNC: 265 MG/DL — HIGH (ref 70–99)
GLUCOSE BLDC GLUCOMTR-MCNC: 436 MG/DL — HIGH (ref 70–99)
GLUCOSE BLDC GLUCOMTR-MCNC: 439 MG/DL — HIGH (ref 70–99)
GLUCOSE BLDC GLUCOMTR-MCNC: 442 MG/DL — HIGH (ref 70–99)
GLUCOSE BLDC GLUCOMTR-MCNC: 468 MG/DL — CRITICAL HIGH (ref 70–99)
RAPID RVP RESULT: SIGNIFICANT CHANGE UP
TROPONIN T SERPL-MCNC: 0.02 NG/ML — SIGNIFICANT CHANGE UP (ref 0–0.06)
TROPONIN T SERPL-MCNC: 0.04 NG/ML — SIGNIFICANT CHANGE UP (ref 0–0.06)

## 2017-11-13 PROCEDURE — 99223 1ST HOSP IP/OBS HIGH 75: CPT

## 2017-11-13 PROCEDURE — 12345: CPT | Mod: NC

## 2017-11-13 PROCEDURE — 99291 CRITICAL CARE FIRST HOUR: CPT

## 2017-11-13 PROCEDURE — 71010: CPT | Mod: 26

## 2017-11-13 PROCEDURE — 93010 ELECTROCARDIOGRAM REPORT: CPT

## 2017-11-13 RX ORDER — BENZOCAINE AND MENTHOL 5; 1 G/100ML; G/100ML
1 LIQUID ORAL EVERY 4 HOURS
Qty: 0 | Refills: 0 | Status: DISCONTINUED | OUTPATIENT
Start: 2017-11-13 | End: 2017-11-15

## 2017-11-13 RX ORDER — FUROSEMIDE 40 MG
40 TABLET ORAL
Qty: 0 | Refills: 0 | Status: DISCONTINUED | OUTPATIENT
Start: 2017-11-13 | End: 2017-11-15

## 2017-11-13 RX ORDER — FUROSEMIDE 40 MG
40 TABLET ORAL DAILY
Qty: 0 | Refills: 0 | Status: DISCONTINUED | OUTPATIENT
Start: 2017-11-13 | End: 2017-11-13

## 2017-11-13 RX ORDER — ISOSORBIDE MONONITRATE 60 MG/1
60 TABLET, EXTENDED RELEASE ORAL DAILY
Qty: 0 | Refills: 0 | Status: DISCONTINUED | OUTPATIENT
Start: 2017-11-13 | End: 2017-11-15

## 2017-11-13 RX ORDER — ASPIRIN/CALCIUM CARB/MAGNESIUM 324 MG
81 TABLET ORAL DAILY
Qty: 0 | Refills: 0 | Status: DISCONTINUED | OUTPATIENT
Start: 2017-11-13 | End: 2017-11-15

## 2017-11-13 RX ORDER — INSULIN LISPRO 100/ML
VIAL (ML) SUBCUTANEOUS
Qty: 0 | Refills: 0 | Status: DISCONTINUED | OUTPATIENT
Start: 2017-11-13 | End: 2017-11-15

## 2017-11-13 RX ORDER — IPRATROPIUM/ALBUTEROL SULFATE 18-103MCG
3 AEROSOL WITH ADAPTER (GRAM) INHALATION EVERY 6 HOURS
Qty: 0 | Refills: 0 | Status: DISCONTINUED | OUTPATIENT
Start: 2017-11-13 | End: 2017-11-15

## 2017-11-13 RX ORDER — DOCUSATE SODIUM 100 MG
100 CAPSULE ORAL THREE TIMES A DAY
Qty: 0 | Refills: 0 | Status: DISCONTINUED | OUTPATIENT
Start: 2017-11-13 | End: 2017-11-15

## 2017-11-13 RX ORDER — SODIUM CHLORIDE 9 MG/ML
3 INJECTION INTRAMUSCULAR; INTRAVENOUS; SUBCUTANEOUS EVERY 8 HOURS
Qty: 0 | Refills: 0 | Status: DISCONTINUED | OUTPATIENT
Start: 2017-11-13 | End: 2017-11-15

## 2017-11-13 RX ORDER — LORATADINE 10 MG/1
10 TABLET ORAL DAILY
Qty: 0 | Refills: 0 | Status: DISCONTINUED | OUTPATIENT
Start: 2017-11-13 | End: 2017-11-15

## 2017-11-13 RX ORDER — INSULIN GLARGINE 100 [IU]/ML
70 INJECTION, SOLUTION SUBCUTANEOUS AT BEDTIME
Qty: 0 | Refills: 0 | Status: DISCONTINUED | OUTPATIENT
Start: 2017-11-13 | End: 2017-11-15

## 2017-11-13 RX ORDER — CARVEDILOL PHOSPHATE 80 MG/1
9.38 CAPSULE, EXTENDED RELEASE ORAL EVERY 12 HOURS
Qty: 0 | Refills: 0 | Status: DISCONTINUED | OUTPATIENT
Start: 2017-11-13 | End: 2017-11-15

## 2017-11-13 RX ORDER — DEXTROSE 50 % IN WATER 50 %
12.5 SYRINGE (ML) INTRAVENOUS ONCE
Qty: 0 | Refills: 0 | Status: DISCONTINUED | OUTPATIENT
Start: 2017-11-13 | End: 2017-11-15

## 2017-11-13 RX ORDER — INSULIN LISPRO 100/ML
20 VIAL (ML) SUBCUTANEOUS
Qty: 0 | Refills: 0 | Status: DISCONTINUED | OUTPATIENT
Start: 2017-11-13 | End: 2017-11-14

## 2017-11-13 RX ORDER — CARVEDILOL PHOSPHATE 80 MG/1
6.25 CAPSULE, EXTENDED RELEASE ORAL EVERY 12 HOURS
Qty: 0 | Refills: 0 | Status: DISCONTINUED | OUTPATIENT
Start: 2017-11-13 | End: 2017-11-13

## 2017-11-13 RX ORDER — SODIUM CHLORIDE 9 MG/ML
1000 INJECTION, SOLUTION INTRAVENOUS
Qty: 0 | Refills: 0 | Status: DISCONTINUED | OUTPATIENT
Start: 2017-11-13 | End: 2017-11-15

## 2017-11-13 RX ORDER — ENOXAPARIN SODIUM 100 MG/ML
40 INJECTION SUBCUTANEOUS EVERY 24 HOURS
Qty: 0 | Refills: 0 | Status: DISCONTINUED | OUTPATIENT
Start: 2017-11-13 | End: 2017-11-14

## 2017-11-13 RX ORDER — TAMSULOSIN HYDROCHLORIDE 0.4 MG/1
0.4 CAPSULE ORAL AT BEDTIME
Qty: 0 | Refills: 0 | Status: DISCONTINUED | OUTPATIENT
Start: 2017-11-13 | End: 2017-11-14

## 2017-11-13 RX ORDER — PANTOPRAZOLE SODIUM 20 MG/1
40 TABLET, DELAYED RELEASE ORAL
Qty: 0 | Refills: 0 | Status: DISCONTINUED | OUTPATIENT
Start: 2017-11-13 | End: 2017-11-15

## 2017-11-13 RX ORDER — ONDANSETRON 8 MG/1
4 TABLET, FILM COATED ORAL EVERY 6 HOURS
Qty: 0 | Refills: 0 | Status: DISCONTINUED | OUTPATIENT
Start: 2017-11-13 | End: 2017-11-15

## 2017-11-13 RX ORDER — INSULIN LISPRO 100/ML
20 VIAL (ML) SUBCUTANEOUS ONCE
Qty: 0 | Refills: 0 | Status: COMPLETED | OUTPATIENT
Start: 2017-11-13 | End: 2017-11-13

## 2017-11-13 RX ORDER — ATORVASTATIN CALCIUM 80 MG/1
80 TABLET, FILM COATED ORAL AT BEDTIME
Qty: 0 | Refills: 0 | Status: DISCONTINUED | OUTPATIENT
Start: 2017-11-13 | End: 2017-11-15

## 2017-11-13 RX ORDER — DIPHENHYDRAMINE HCL 50 MG
50 CAPSULE ORAL EVERY 6 HOURS
Qty: 0 | Refills: 0 | Status: DISCONTINUED | OUTPATIENT
Start: 2017-11-13 | End: 2017-11-15

## 2017-11-13 RX ORDER — GLUCAGON INJECTION, SOLUTION 0.5 MG/.1ML
1 INJECTION, SOLUTION SUBCUTANEOUS ONCE
Qty: 0 | Refills: 0 | Status: DISCONTINUED | OUTPATIENT
Start: 2017-11-13 | End: 2017-11-15

## 2017-11-13 RX ORDER — DEXTROSE 50 % IN WATER 50 %
1 SYRINGE (ML) INTRAVENOUS ONCE
Qty: 0 | Refills: 0 | Status: DISCONTINUED | OUTPATIENT
Start: 2017-11-13 | End: 2017-11-15

## 2017-11-13 RX ORDER — ACETAZOLAMIDE 250 MG/1
250 TABLET ORAL
Qty: 0 | Refills: 0 | Status: DISCONTINUED | OUTPATIENT
Start: 2017-11-13 | End: 2017-11-13

## 2017-11-13 RX ORDER — LEVOTHYROXINE SODIUM 125 MCG
112 TABLET ORAL DAILY
Qty: 0 | Refills: 0 | Status: DISCONTINUED | OUTPATIENT
Start: 2017-11-13 | End: 2017-11-15

## 2017-11-13 RX ORDER — HYDRALAZINE HCL 50 MG
10 TABLET ORAL THREE TIMES A DAY
Qty: 0 | Refills: 0 | Status: DISCONTINUED | OUTPATIENT
Start: 2017-11-13 | End: 2017-11-15

## 2017-11-13 RX ORDER — RANOLAZINE 500 MG/1
1000 TABLET, FILM COATED, EXTENDED RELEASE ORAL
Qty: 0 | Refills: 0 | Status: DISCONTINUED | OUTPATIENT
Start: 2017-11-13 | End: 2017-11-15

## 2017-11-13 RX ADMIN — LORATADINE 10 MILLIGRAM(S): 10 TABLET ORAL at 14:19

## 2017-11-13 RX ADMIN — SODIUM CHLORIDE 3 MILLILITER(S): 9 INJECTION INTRAMUSCULAR; INTRAVENOUS; SUBCUTANEOUS at 05:14

## 2017-11-13 RX ADMIN — Medication 300 MILLIGRAM(S): at 22:27

## 2017-11-13 RX ADMIN — Medication 12: at 13:30

## 2017-11-13 RX ADMIN — Medication 112 MICROGRAM(S): at 05:39

## 2017-11-13 RX ADMIN — RANOLAZINE 1000 MILLIGRAM(S): 500 TABLET, FILM COATED, EXTENDED RELEASE ORAL at 18:15

## 2017-11-13 RX ADMIN — ONDANSETRON 4 MILLIGRAM(S): 8 TABLET, FILM COATED ORAL at 16:36

## 2017-11-13 RX ADMIN — Medication 30 MILLILITER(S): at 18:14

## 2017-11-13 RX ADMIN — Medication 10 MILLIGRAM(S): at 14:19

## 2017-11-13 RX ADMIN — Medication 10 MILLIGRAM(S): at 22:27

## 2017-11-13 RX ADMIN — SODIUM CHLORIDE 3 MILLILITER(S): 9 INJECTION INTRAMUSCULAR; INTRAVENOUS; SUBCUTANEOUS at 14:23

## 2017-11-13 RX ADMIN — Medication 20 UNIT(S): at 14:19

## 2017-11-13 RX ADMIN — RANOLAZINE 1000 MILLIGRAM(S): 500 TABLET, FILM COATED, EXTENDED RELEASE ORAL at 05:40

## 2017-11-13 RX ADMIN — Medication 40 MILLIGRAM(S): at 05:42

## 2017-11-13 RX ADMIN — Medication 150 MILLIGRAM(S): at 09:22

## 2017-11-13 RX ADMIN — ATORVASTATIN CALCIUM 80 MILLIGRAM(S): 80 TABLET, FILM COATED ORAL at 22:27

## 2017-11-13 RX ADMIN — Medication 2: at 22:27

## 2017-11-13 RX ADMIN — Medication 200 MILLIGRAM(S): at 16:36

## 2017-11-13 RX ADMIN — BENZOCAINE AND MENTHOL 1 LOZENGE: 5; 1 LIQUID ORAL at 14:19

## 2017-11-13 RX ADMIN — Medication 20 UNIT(S): at 08:43

## 2017-11-13 RX ADMIN — Medication 6: at 18:14

## 2017-11-13 RX ADMIN — Medication 81 MILLIGRAM(S): at 13:31

## 2017-11-13 RX ADMIN — CARVEDILOL PHOSPHATE 6.25 MILLIGRAM(S): 80 CAPSULE, EXTENDED RELEASE ORAL at 05:39

## 2017-11-13 RX ADMIN — Medication 100 MILLIGRAM(S): at 13:31

## 2017-11-13 RX ADMIN — Medication 2.5 MILLIGRAM(S): at 05:39

## 2017-11-13 RX ADMIN — SODIUM CHLORIDE 3 MILLILITER(S): 9 INJECTION INTRAMUSCULAR; INTRAVENOUS; SUBCUTANEOUS at 22:30

## 2017-11-13 RX ADMIN — Medication 40 MILLIGRAM(S): at 18:17

## 2017-11-13 RX ADMIN — ACETAZOLAMIDE 250 MILLIGRAM(S): 250 TABLET ORAL at 05:41

## 2017-11-13 RX ADMIN — ISOSORBIDE MONONITRATE 60 MILLIGRAM(S): 60 TABLET, EXTENDED RELEASE ORAL at 14:19

## 2017-11-13 RX ADMIN — Medication 3 MILLILITER(S): at 10:53

## 2017-11-13 RX ADMIN — PANTOPRAZOLE SODIUM 40 MILLIGRAM(S): 20 TABLET, DELAYED RELEASE ORAL at 09:17

## 2017-11-13 RX ADMIN — TAMSULOSIN HYDROCHLORIDE 0.4 MILLIGRAM(S): 0.4 CAPSULE ORAL at 22:27

## 2017-11-13 RX ADMIN — Medication 12: at 09:17

## 2017-11-13 RX ADMIN — Medication 10 MILLIGRAM(S): at 05:39

## 2017-11-13 RX ADMIN — INSULIN GLARGINE 70 UNIT(S): 100 INJECTION, SOLUTION SUBCUTANEOUS at 22:27

## 2017-11-13 RX ADMIN — CARVEDILOL PHOSPHATE 9.38 MILLIGRAM(S): 80 CAPSULE, EXTENDED RELEASE ORAL at 18:15

## 2017-11-13 NOTE — ED ADULT NURSE REASSESSMENT NOTE - NS ED NURSE REASSESS COMMENT FT1
Pt. sleeping comfortably on stretcher.  VSS; pt. in no obvious respiratory distress.  In NAD, will continue to monitor.

## 2017-11-13 NOTE — PROGRESS NOTE ADULT - SUBJECTIVE AND OBJECTIVE BOX
chief complaint of SOB (2017 02:57)    61 y/o female with hx of chronic systolic CHF with EF of 35 % due to ICM s/p LHC in  with multiple high grade calcified plaques not amenable to intervention or CT sx as per Cardiology reporting, Obesity, hypothyroid, ANIKA on nocturnal Cpap of 12, HTN, HLD, GERD, DM-2, psoriasis, Asthma, Peripheral neuropathy due to DM, presents to ED with increasing SOB, increasing TALBERT to the point where she has to place chairs every 6 feet in her house because she can not walk more than 6 feet without being winded and fatigued. She describes the SOB as pressure like and air hunger. She notes she started feeling ill about 1 week ago with URI symptoms and then was having some green sputum. No pleurisy or fevers. She has had no sick contacts, or travel. She ambulates with cane at home and has a wheel chair for when she goes out. She states she sought a second opinion about her CAD with Dr. Hayes at [place] and as of now has an appt. for cardiac cath and atherectomy on . In the ED patient with moderate resp. distress with increased WOB needing Bipap. ABG with hypercapnic resp. failure. Patient also reports taking 4 days of old supply of cephalexin without any improvement in symptoms. (2017 02:57)    Pt c/o cough with clear sputum.     ROS:  no fevers  +chills  no chest pain  +cough    PAST MEDICAL & SURGICAL HISTORY:  Tuberculosis: Treated at the age of 3 years  Psoriatic arthritis  Diabetic neuropathy  Blind: Left Eye  ANIKA treated with BiPAP  Acute promyelocytic leukemia  Osteoarthritis  Hypothyroidism  Hypertension  Hyperlipidemia  Asthma  Diabetes mellitus  S/P  section: x 6  S/P carpal tunnel release  S/P cervical spinal fusion  S/P cholecystectomy  H/O abdominal hysterectomy: secondry to Fibroids    MEDICATIONS  (STANDING):  aspirin enteric coated 81 milliGRAM(s) Oral daily  atorvastatin 80 milliGRAM(s) Oral at bedtime  carvedilol 9.375 milliGRAM(s) Oral every 12 hours  dextrose 5%. 1000 milliLiter(s) (50 mL/Hr) IV Continuous <Continuous>  dextrose 50% Injectable 12.5 Gram(s) IV Push once  docusate sodium 100 milliGRAM(s) Oral three times a day  enalapril 2.5 milliGRAM(s) Oral daily  enoxaparin Injectable 40 milliGRAM(s) SubCutaneous every 24 hours  furosemide   Injectable 40 milliGRAM(s) IV Push two times a day  hydrALAZINE 10 milliGRAM(s) Oral three times a day  insulin glargine Injectable (LANTUS) 70 Unit(s) SubCutaneous at bedtime  insulin lispro (HumaLOG) corrective regimen sliding scale   SubCutaneous Before meals and at bedtime  insulin lispro Injectable (HumaLOG) 20 Unit(s) SubCutaneous before breakfast  isosorbide   mononitrate ER Tablet (IMDUR) 60 milliGRAM(s) Oral daily  levothyroxine 112 MICROGram(s) Oral daily  loratadine 10 milliGRAM(s) Oral daily  pantoprazole    Tablet 40 milliGRAM(s) Oral before breakfast  pregabalin 300 milliGRAM(s) Oral at bedtime  pregabalin 150 milliGRAM(s) Oral daily  ranolazine 1000 milliGRAM(s) Oral two times a day  sodium chloride 0.9% lock flush 3 milliLiter(s) IV Push every 8 hours  tamsulosin 0.4 milliGRAM(s) Oral at bedtime    MEDICATIONS  (PRN):  ALBUTerol/ipratropium for Nebulization 3 milliLiter(s) Nebulizer every 6 hours PRN Shortness of Breath and/or Wheezing  benzocaine 15 mG/menthol 3.6 mG Lozenge 1 Lozenge Oral every 4 hours PRN Sore Throat  dextrose Gel 1 Dose(s) Oral once PRN Blood Glucose LESS THAN 70 milliGRAM(s)/deciliter  glucagon  Injectable 1 milliGRAM(s) IntraMuscular once PRN Glucose LESS THAN 70 milligrams/deciliter  guaiFENesin    Syrup 200 milliGRAM(s) Oral every 6 hours PRN Cough  ondansetron Injectable 4 milliGRAM(s) IV Push every 6 hours PRN Nausea    EXAM:  Vital Signs Last 24 Hrs  T(C): 36.8 (2017 10:20), Max: 36.8 (2017 10:20)  T(F): 98.3 (2017 10:20), Max: 98.3 (2017 10:20)  HR: 98 (2017 14:17) (81 - 100)  BP: 134/66 (2017 14:17) (120/70 - 180/81)  BP(mean): 116 (2017 02:57) (116 - 116)  RR: 18 (2017 10:20) (18 - 30)  SpO2: 100% (2017 06:55) (71% - 100%)     @ 07:01  -   @ 15:54  --------------------------------------------------------  IN: 0 mL / OUT: 1750 mL / NET: -1750 mL    GENERAL NAD, ON NASAL CANULA. COUGHING ON EXAM.    HEENT: NORMAL CEPHALIC ATRAUMATIC, EOMI, MOIST MUCUS MEBRANES  NECK SUPPLE  CVS S1S2, RRR,   RESP BLCTA, NO RHONCHI  ABD SOFT, NON TENDER, NON DISTENDED  EXT NO EDEMA  NEURO MOVES ALL 4 EXTREMITES  PSYCH APPROPRIATE MOOD  SKIN WARM, DRY    LABS:  PT/INR - ( 2017 23:14 )   PT: 12.0 sec;   INR: 1.09 ratio    PTT - ( 2017 23:14 )  PTT:29.2 sec  LIVER FUNCTIONS - ( 2017 23:14 )  Alb: 3.7 g/dL / Pro: 7.2 g/dL / ALK PHOS: 118 U/L / ALT: 12 U/L / AST: 13 U/L / GGT: x                               10.2   7.0   )-----------( 151      ( 2017 23:14 )            32.7   -12  139  |  105  |  27.0<H>  ----------------------------<  282<H>  4.3   |  22.0  |  1.24  Ca    8.8      2017 23:14  TPro  7.2  /  Alb  3.7  /  TBili  0.3<L>  /  DBili  x   /  AST  13  /  ALT  12  /  AlkPhos  118  11-12  CARDIAC MARKERS ( 2017 06:16 )  x     / 0.02 ng/mL / x     / x     / x      CARDIAC MARKERS ( 2017 23:14 )  x     / <0.01 ng/mL / x     / x     / x        CXR:  < from: Xray Chest 1 View AP/PA. (. @ 00:31) >  Impression: No acute pulmonary disease.  < end of copied text >

## 2017-11-13 NOTE — CONSULT NOTE ADULT - SUBJECTIVE AND OBJECTIVE BOX
PULMONARY CONSULT NOTE      LAURA ROONEY-[id number]    Patient is a 62y old  Female who presents with a chief complaint of SOB (2017 02:57)  63 y/o female with hx of chronic systolic CHF with EF of 35 % due to ICM s/p LHC in  with multiple high grade calcified plaques not amenable to intervention or CT sx as per Cardiology reporting, Obesity, hypothyroid, ANIKA on nocturnal Cpap of 12, HTN, HLD, GERD, DM-2, psoriasis, Asthma, Peripheral neuropathy due to DM, presents to ED with increasing SOB, increasing TALBERT to the point where she has to place chairs every 6 feet in her house because she can not walk more than 6 feet without being winded and fatigued. She describes the SOB as pressure like and air hunger. She notes she started feeling ill about 1 week ago with URI symptoms and then was having some green sputum. No pleurisy or fevers. She has had no sick contacts, or travel. She ambulates with cane at home and has a wheel chair for when she goes out. She states she sought a second opinion about her CAD with Dr. Hayes at [place] and as of now has an appt. for cardiac cath and atherectomy on . In the ED patient with moderate resp. distress with increased WOB needing Bipap. ABG with hypercapnic resp. failure. Currently she is now more comfortable and able to speak in full sentences on Bipap. Patient also reports taking 4 days of old supply of cephalexin without any improvement in symptoms.       HISTORY OF PRESENT ILLNESS:  feels sig better  had frothy sputum  orthopnea  no chest pain  former smoker  given inhalers in past, not using  last PFTs in office without air flow obstruction 2016    MEDICATIONS  (STANDING):  acetazolamide    Tablet 250 milliGRAM(s) Oral two times a day  aspirin enteric coated 81 milliGRAM(s) Oral daily  atorvastatin 80 milliGRAM(s) Oral at bedtime  carvedilol 6.25 milliGRAM(s) Oral every 12 hours  dextrose 5%. 1000 milliLiter(s) (50 mL/Hr) IV Continuous <Continuous>  dextrose 50% Injectable 12.5 Gram(s) IV Push once  enalapril 2.5 milliGRAM(s) Oral daily  enoxaparin Injectable 40 milliGRAM(s) SubCutaneous every 24 hours  furosemide   Injectable 40 milliGRAM(s) IV Push daily  hydrALAZINE 10 milliGRAM(s) Oral three times a day  insulin glargine Injectable (LANTUS) 70 Unit(s) SubCutaneous at bedtime  insulin lispro (HumaLOG) corrective regimen sliding scale   SubCutaneous Before meals and at bedtime  insulin lispro Injectable (HumaLOG) 20 Unit(s) SubCutaneous before breakfast  isosorbide   mononitrate ER Tablet (IMDUR) 60 milliGRAM(s) Oral daily  levothyroxine 112 MICROGram(s) Oral daily  loratadine 10 milliGRAM(s) Oral daily  pantoprazole    Tablet 40 milliGRAM(s) Oral before breakfast  pregabalin 300 milliGRAM(s) Oral at bedtime  pregabalin 150 milliGRAM(s) Oral daily  ranolazine 1000 milliGRAM(s) Oral two times a day  sodium chloride 0.9% lock flush 3 milliLiter(s) IV Push every 8 hours  tamsulosin 0.4 milliGRAM(s) Oral at bedtime      MEDICATIONS  (PRN):  ALBUTerol/ipratropium for Nebulization 3 milliLiter(s) Nebulizer every 6 hours PRN Shortness of Breath and/or Wheezing  dextrose Gel 1 Dose(s) Oral once PRN Blood Glucose LESS THAN 70 milliGRAM(s)/deciliter  glucagon  Injectable 1 milliGRAM(s) IntraMuscular once PRN Glucose LESS THAN 70 milligrams/deciliter  ondansetron Injectable 4 milliGRAM(s) IV Push every 6 hours PRN Nausea      Allergies    Cipro (Unknown)  Demerol HCl (Hives)  iodine (Unknown)  iodine containing compounds (Anaphylaxis)  loperamide (Unknown)  phenylpiperidine derivatives (Unknown)  tramadol (Unknown)    Intolerances        PAST MEDICAL & SURGICAL HISTORY:  Tuberculosis: Treated at the age of 3 years  Psoriatic arthritis  Diabetic neuropathy  Blind: Left Eye  ANIKA treated with BiPAP  Acute promyelocytic leukemia  Osteoarthritis  Hypothyroidism  Hypertension  Hyperlipidemia  Asthma  Diabetes mellitus  S/P  section: x 6  S/P carpal tunnel release  S/P cervical spinal fusion  S/P cholecystectomy  H/O abdominal hysterectomy: secondry to Fibroids      FAMILY HISTORY:  Family history of coronary artery disease (Grandparent)  Family history of blood disorder (Sibling)  Family history of cancer (Sibling): Sisters - Ovarian Caner and Lung Cancer  Brothers - Lung Cancer  Family history of diabetes mellitus (Sibling)      SOCIAL HISTORY  Smoking History:     REVIEW OF SYSTEMS:    CONSTITUTIONAL:  No fevers, chills, sweats    HEENT:  Eyes:  No diplopia or blurred vision. ENT:  No earache, sore throat or runny nose.    CARDIOVASCULAR:  No pressure, squeezing, tightness, or heaviness about the chest; no palpitations.    RESPIRATORY:   Mild SOBOE    GASTROINTESTINAL:  No abdominal pain, nausea, vomiting or diarrhea.    GENITOURINARY:  No dysuria, frequency or urgency.    NEUROLOGIC:  No paresthesias, fasciculations, seizures or weakness.    PSYCHIATRIC:  No disorder of thought or mood.    Vital Signs Last 24 Hrs  T(C): 36.6 (2017 06:55), Max: 36.7 (2017 22:17)  T(F): 97.9 (2017 06:55), Max: 98 (2017 22:17)  HR: 89 (2017 06:55) (81 - 100)  BP: 138/70 (2017 06:55) (138/70 - 180/81)  BP(mean): 116 (2017 02:57) (116 - 116)  RR: 20 (2017 06:55) (19 - 30)  SpO2: 100% (2017 06:55) (71% - 100%)    PHYSICAL EXAMINATION:    GENERAL: The patient is a well-developed, well-nourished _____in no apparent distress.     HEENT: Head is normocephalic and atraumatic. Extraocular muscles are intact. Mucous membranes are moist.     NECK: Supple.     LUNGS: moderate air entry  without wheezing, rales, or rhonchi. Respirations unlabored    HEART: Regular rate and rhythm without murmur.    ABDOMEN: Soft, nontender, and nondistended.  No hepatosplenomegaly is noted.    EXTREMITIES: With  edema.    NEUROLOGIC: Grossly intact.      LABS:                        10.2   7.0   )-----------( 151      ( 2017 23:14 )             32.7         139  |  105  |  27.0<H>  ----------------------------<  282<H>  4.3   |  22.0  |  1.24    Ca    8.8      2017 23:14    TPro  7.2  /  Alb  3.7  /  TBili  0.3<L>  /  DBili  x   /  AST  13  /  ALT  12  /  AlkPhos  118  11-12    PT/INR - ( 2017 23:14 )   PT: 12.0 sec;   INR: 1.09 ratio         PTT - ( 2017 23:14 )  PTT:29.2 sec    ABG - ( 2017 22:59 )  pH: 7.29  /  pCO2: 48    /  pO2: 159   / HCO3: 21    / Base Excess: -3.7  /  SaO2: 97                CARDIAC MARKERS ( 2017 06:16 )  x     / 0.02 ng/mL / x     / x     / x      CARDIAC MARKERS ( 2017 23:14 )  x     / <0.01 ng/mL / x     / x     / x            Serum Pro-Brain Natriuretic Peptide: 3701 pg/mL (17 @ 23:14)          MICROBIOLOGY:    RADIOLOGY & ADDITIONAL STUDIES:  CXR reviewed and compared
GEMINI ROONEY  [id number]      HPI:  63 y/o female with hx of ICM with chronic systolic CHF s/p LHC in  with multiple high grade calcified plaques not amenable to intervention or CTS, DM, obesity, hypothyroid, ANIKA on nocturnal CPAP, HTN, HLD, p/w increasing SOB.  Patient states she started feeling ill about 1 week ago with URI symptoms and then was having some green sputum.  Patient was seen by PMD and started abx but did not improve.  She was also using nebs withouit relief.  yesterday SOB became significantly worse, a/w chest pressure and came to ER.  Patient given lasix and steroids, had been placed on BiPAP initially, but is feeling much better today.  No further CP.  Still with some orthopnea.  Patient also had cough and some chills, but no clear fever.  Denies palps, syncope.    She states she sought a second opinion about her CAD with Dr. Hayes at [place] and as of now has an appt. for cardiac cath and atherectomy on .         ALLERGIES:  Cipro (Unknown)  Demerol HCl (Hives)  iodine (Unknown)  iodine containing compounds (Anaphylaxis)  loperamide (Unknown)  phenylpiperidine derivatives (Unknown)  tramadol (Unknown)      PAST MEDICAL & SURGICAL HISTORY:  Tuberculosis: Treated at the age of 3 years  Psoriatic arthritis  Diabetic neuropathy  Blind: Left Eye  Acute promyelocytic leukemia  Osteoarthritis  S/P  section: x 6  S/P carpal tunnel release  S/P cervical spinal fusion  S/P cholecystectomy  H/O abdominal hysterectomy: secondry to Fibroids  Otherwise, as noted above      MEDICATIONS (HOME):  · 	isosorbide mononitrate 60 mg oral tablet, extended release: 1 tab(s) orally once a day  · 	nitroglycerin 0.4 mg sublingual tablet: 1 tab(s) sublingual every 5 minutes up to 3 doses daily MDD:3 tabs  · 	ranolazine 1000 mg oral tablet, extended release: 1 tab(s) orally 2 times a day  · 	tamsulosin 0.4 mg oral capsule: 2 cap(s) orally once a day  · 	hydrALAZINE 10 mg oral tablet: 1 tab(s) orally 3 times a day  · 	albuterol 90 mcg/inh inhalation aerosol: 2 puff(s) inhaled every 6 hours, As Needed  · 	carvedilol 9.375 mg oral tablet: 1 tab(s) orally every 12 hours  · 	atorvastatin 80 mg oral tablet: 1 tab(s) orally once a day (at bedtime)  · 	aspirin 81 mg oral tablet: 1 tab(s) orally once a day  · 	furosemide 40 mg oral tablet: 1 tab(s) orally once a day  · 	acetaZOLAMIDE 250 mg oral tablet: 1 tab(s) orally 2 times a day  · 	pregabalin 300 mg oral capsule: 1 cap(s) orally once a day (at bedtime)  · 	pregabalin 150 mg oral capsule: 1 cap(s) orally once a day  · 	levothyroxine 112 mcg (0.112 mg) oral tablet: 1 tab(s) orally once a day  · 	cetirizine 10 mg oral tablet: 1 tab(s) orally once a day  · 	clotrimazole topical: Apply topically to affected area , As Needed  · 	ferrous sulfate 325 mg (65 mg elemental iron) oral tablet: 1  orally once a day  · 	pantoprazole 40 mg oral delayed release tablet: 1 tab(s) orally once a day  · 	Tresiba FlexTouch: 75 unit(s) subcutaneous once a day  · 	Ventolin HFA 90 mcg/inh inhalation aerosol: PRN for asthma  · 	Proventil HFA 90 mcg/inh inhalation aerosol:  inhaled , As Needed  · 	OxyCONTIN: 5 milligram(s) orally once a day, As Needed      SOCIAL HISTORY:  Patient denies alcohol, tobacco, drug use    FAMILY HISTORY:  Family history of coronary artery disease (Grandparent)  Family history of blood disorder (Sibling)  Family history of cancer (Sibling): Sisters - Ovarian Caner and Lung Cancer  Brothers - Lung Cancer  Family history of diabetes mellitus (Sibling)      ROS:  Patient denies H/A, and other than noted above full ROS is unremarkable      PHYSICAL EXAM:  Vital Signs Last 24 Hrs  T(C): 36.6 (2017 06:55), Max: 36.7 (2017 22:17)  T(F): 97.9 (2017 06:55), Max: 98 (2017 22:17)  HR: 89 (2017 06:55) (81 - 100)  BP: 138/70 (2017 06:55) (138/70 - 180/81)  BP(mean): 116 (2017 02:57) (116 - 116)  RR: 20 (2017 06:55) (19 - 30)  SpO2: 100% (2017 06:55) (71% - 100%)  General: Patient comfortable in NAD  HEENT: NCAT, mmm, EOMI  Neck: ?JVD, no carotid bruits  CVS: nl s1, split s2, no s3, no murmur or rubs, RRR, distant  Chest: CTA b/l, diminished at bases  Abdomen: soft, nt/nd  Extremities: No c/c/e  Neuro: A&O x3  Psych: Normal affect      LABS:                        10.2   7.0   )-----------( 151      ( 2017 23:14 )             32.7     11-12    139  |  105  |  27.0<H>  ----------------------------<  282<H>  4.3   |  22.0  |  1.24    Ca    8.8      2017 23:14    TPro  7.2  /  Alb  3.7  /  TBili  0.3<L>  /  DBili  x   /  AST  13  /  ALT  12  /  AlkPhos  118  11-12    CARDIAC MARKERS ( 2017 06:16 )  x     / 0.02 ng/mL / x     / x     / x      CARDIAC MARKERS ( 2017 23:14 )  x     / <0.01 ng/mL / x     / x     / x          PT/INR - ( 2017 23:14 )   PT: 12.0 sec;   INR: 1.09 ratio         PTT - ( 2017 23:14 )  PTT:29.2 sec      RADIOLOGY:        Assessment:  63 y/o female with hx of ICM with chronic systolic CHF s/p C in  with multiple high grade calcified plaques not amenable to intervention or CTS, DM, obesity, hypothyroid, ANIKA on nocturnal CPAP, HTN, HLD, p/w increasing SOB.  Patient with recent URI likely precipitating CHF exacerbation.  Associated CP, ?anginal, trops negative  Improved with lasix.    Plan:  1. Tele  2. Lasix IV BID  3. Resume other CV meds in doses as above  4. Agree with addition of ACEi  5. Ultimately plan for patient to f/u at [place] for coronary atherectomy  6. Nebs  7. OOB

## 2017-11-13 NOTE — H&P ADULT - PROBLEM SELECTOR PLAN 9
Duonebs prn, not wheezing as of now, likely cardiac asthma at home, no role to cont. steroids at this time

## 2017-11-13 NOTE — H&P ADULT - HISTORY OF PRESENT ILLNESS
63 y/o female with hx of chronic systolic CHF with EF of 35 % due to ICM s/p LHC in 7/17 with multiple high grade calcified plaques not amenable to intervention or CT sx as per Cardiology reporting, Obesity, hypothyroid, ANIKA on nocturnal Cpap of 12, HTN, HLD, GERD, DM-2, psoriasis, Asthma, Peripheral neuropathy due to DM, presents to ED with increasing SOB, increasing TALBERT to the point where she has to place chairs every 6 feet in her house because she can not walk more than 6 feet without being winded and fatigued. She describes the SOB as pressure like and air hunger. She notes she started feeling ill about 1 week ago with URI symptoms and then was having some green sputum. No pleurisy or fevers. She has had no sick contacts, or travel. She ambulates with cane at home and has a wheel chair for when she goes out. She states she sought a second opinion about her CAD with Dr. Hayes at [place] [facility] and as of now has an appt. for cardiac cath and atherectomy on 11/29. In the ED patient with moderate resp. distress with increased WOB needing Bipap. ABG with hypercapnic resp. failure. Currently she is now more comfortable and able to speak in full sentences on Bipap. Patient also reports taking 4 days of old supply of cephalexin without any improvement in symptoms.

## 2017-11-13 NOTE — PROGRESS NOTE ADULT - ASSESSMENT
63 y/o female with PMH of  CAD, ANIKA, HTN, HLD, GERD, Hypothyroid, peripheral neuropathy presented with acute on chronic systolic CHF exacerbation, Viral bronchitis, and Acute hypercapnic resp. failure.     -SOB 2/2 Acute on chronic systolic congestive heart failure.    Patient TALBERT/Acute SOB likely multifactorial with viral bronchitis and underlying severe CAD causing myocardial dysfunction leading to acute on chronic systolic CHF.   Now off bipap.   cardio noted will increase lasix to 40mg iv bid.     -Acute respiratory failure with hypercapnia.   Plan: Plan as above.     -Coronary artery disease of native artery of native heart with stable angina pectoris.  cw home medications   F/U with Dr. Hayes on 11/29 for intervention as stated above.     - Type 2 diabetes mellitus with other circulatory complication, with long-term current use of insulin.    Plan: Lantus, Humalog with meals, Coverage insulin.     - Hyperlipidemia, unspecified hyperlipidemia type.   Plan: statin, cardiac diet.     - Essential hypertension.  cw coreg and enalapril.    - Diabetic polyneuropathy associated with type 2 diabetes mellitus.   Plan: Cont. Lyrica, fall risk protocol.     -Psoriatic arthritis.   Plan: cont. o/p regimen.     -Asthma, unspecified asthma severity, unspecified whether complicated, unspecified whether persistent.  Plan: Duonebs prn, not wheezing as of now, no role to cont. steroids at this time.     - Gastroesophageal reflux disease, esophagitis presence not specified.   Plan; PPI.    -DVT ppx:  cw lovenox

## 2017-11-13 NOTE — PHYSICAL THERAPY INITIAL EVALUATION ADULT - ADDITIONAL COMMENTS
Pt lives in a private home with her daughter. no steps to navigate. Pt was independent PTA with SAC PTA. Pt owns SAC, RW, shower chair and w/c which she uses for community navigation.

## 2017-11-13 NOTE — PHYSICAL THERAPY INITIAL EVALUATION ADULT - PERTINENT HX OF CURRENT PROBLEM, REHAB EVAL
61 y/o h/o chronic CHF s/p [facility] 7/17 with multiple calcified plaques not amenable to intervention or CT sx as per Cardiology, Obesity, hypothyroid, ANIKA on nocturnal Cpap, HTN, HLD, GERD, DM-2, psoriasis, Asthma, Peripheral diabetic neuropathy, Came to ED with c/o SOB. acute on chronic systolic CHF exacerbation, Viral bronchitis, and Acute hypercapnic resp. failure. Now admitted for acute on chronic systolic CHF exacerbation, Viral bronchitis, and Acute hypercapnic resp. failure 63 y/o h/o chronic CHF s/p [facility] 7/17 with multiple calcified plaques not amenable to intervention or CT sx as per Cardiology, Obesity, hypothyroid, ANIKA on nocturnal Cpap, HTN, HLD, GERD, DM-2, psoriasis, Asthma, Peripheral diabetic neuropathy, Came to ED with c/o SOB. Now admitted for acute on chronic systolic CHF exacerbation, Viral bronchitis, and Acute hypercapnic resp. failure

## 2017-11-13 NOTE — ED ADULT NURSE REASSESSMENT NOTE - NS ED NURSE REASSESS COMMENT FT1
Pt. resting comfortably on stretcher.  No complaints of pain.  Pt. states relief of sob upon initiation of bipap; O2 sat wnl.  Commode placed at bedside to conserve oxygenation; pt. educated on importance of conservation of energy and maximization of oxygenation with bipap; verbalized understanding. In NAD, will continue to monitor.

## 2017-11-13 NOTE — H&P ADULT - PROBLEM SELECTOR PLAN 1
Patient TALBERT/Acute SOB likely multifactorial with viral bronchitis and underlying severe CAD causing myocardial dysfunction leading to acute on chronic systolic CHF. Currently feels markedly better on Bipap. Repeat ABG, cont. Bipap for WOB. Diuresis, cont. o/p regimen, trend EKG/Trop. Pulm/Cardio eval. OOB, DVT-P, Fall risk precautions. Cardiac diet. RVP neg. Monitor WBC/Temp curve. No indication for ABx at this time.

## 2017-11-13 NOTE — CONSULT NOTE ADULT - ASSESSMENT
acute hypercapnic ventilatory failure secondary to CHF  much improved clinically post diuretics  known tight LAD lesion, cardiomyopathy by hx, BNP and CXR consistent  getting atherectomy in [place] end of month  underlying obesity, ANIKA, last PFT without air flow obstruction  will address bipap, does not meet trilogy criteria, wean back to cpap 12 as improves  Cardiology input, check troponin, echo  prn nebs, short burst medrol, abx acute hypercapnic ventilatory failure secondary to CHF  much improved clinically post diuretics  known tight LAD lesion, cardiomyopathy by hx, BNP and CXR consistent  getting atherectomy in [place] end of month  underlying obesity, ANIKA, last PFT without air flow obstruction  will address bipap, does not meet trilogy criteria, wean back to cpap 12 as improves  Cardiology input, check troponin, echo  prn nebs,   check procalcitonin, medrol if any bronchospasm acute hypercapnic ventilatory failure secondary to CHF  much improved clinically post diuretics  known tight LAD lesion, cardiomyopathy by hx, BNP and CXR consistent  getting atherectomy in [facility] end of month  underlying obesity, ANIKA, last PFT without air flow obstruction  will address bipap, does not meet trilogy criteria, wean back to cpap 12 as improves  Cardiology input, check troponin, echo  prn nebs,   check procalcitonin, medrol if any bronchospasm  stop diamox, prn sig metabolic alkalosis from diuretics

## 2017-11-13 NOTE — PATIENT PROFILE ADULT. - NUTRITION PROFILE
hemoglobin A1C greater than 7%/BMI greater than 40 heart failure/hemoglobin A1C greater than 7%/do you have any questions about your prescribed diet?/BMI greater than 40

## 2017-11-13 NOTE — H&P ADULT - NSHPLABSRESULTS_GEN_ALL_CORE
CXR: under penetrated, increased vascular markings compared to prior  EKG: ST at 107, Q waves anteriorly, non-specific ST-T changes

## 2017-11-13 NOTE — H&P ADULT - ASSESSMENT
61 y/o female with acute on chronic CHF exacerbation, Viral bronchitis, Acute hypercapnic resp. failure, CAD, ANIKA, HTN, HLD, GERD, Hypothyroid, peripheral neuropathy

## 2017-11-14 LAB
ANION GAP SERPL CALC-SCNC: 10 MMOL/L — SIGNIFICANT CHANGE UP (ref 5–17)
BASOPHILS # BLD AUTO: 0 K/UL — SIGNIFICANT CHANGE UP (ref 0–0.2)
BASOPHILS NFR BLD AUTO: 0.4 % — SIGNIFICANT CHANGE UP (ref 0–2)
BUN SERPL-MCNC: 40 MG/DL — HIGH (ref 8–20)
CALCIUM SERPL-MCNC: 9 MG/DL — SIGNIFICANT CHANGE UP (ref 8.6–10.2)
CHLORIDE SERPL-SCNC: 105 MMOL/L — SIGNIFICANT CHANGE UP (ref 98–107)
CO2 SERPL-SCNC: 27 MMOL/L — SIGNIFICANT CHANGE UP (ref 22–29)
CREAT SERPL-MCNC: 1.36 MG/DL — HIGH (ref 0.5–1.3)
EOSINOPHIL # BLD AUTO: 0.3 K/UL — SIGNIFICANT CHANGE UP (ref 0–0.5)
EOSINOPHIL NFR BLD AUTO: 4 % — SIGNIFICANT CHANGE UP (ref 0–6)
GLUCOSE BLDC GLUCOMTR-MCNC: 125 MG/DL — HIGH (ref 70–99)
GLUCOSE BLDC GLUCOMTR-MCNC: 149 MG/DL — HIGH (ref 70–99)
GLUCOSE BLDC GLUCOMTR-MCNC: 173 MG/DL — HIGH (ref 70–99)
GLUCOSE BLDC GLUCOMTR-MCNC: 177 MG/DL — HIGH (ref 70–99)
GLUCOSE SERPL-MCNC: 120 MG/DL — HIGH (ref 70–115)
HBA1C BLD-MCNC: 6.8 % — HIGH (ref 4–5.6)
HCT VFR BLD CALC: 27.4 % — LOW (ref 37–47)
HGB BLD-MCNC: 8.7 G/DL — LOW (ref 12–16)
LYMPHOCYTES # BLD AUTO: 1.7 K/UL — SIGNIFICANT CHANGE UP (ref 1–4.8)
LYMPHOCYTES # BLD AUTO: 24 % — SIGNIFICANT CHANGE UP (ref 20–55)
MAGNESIUM SERPL-MCNC: 1.9 MG/DL — SIGNIFICANT CHANGE UP (ref 1.6–2.6)
MCHC RBC-ENTMCNC: 30.3 PG — SIGNIFICANT CHANGE UP (ref 27–31)
MCHC RBC-ENTMCNC: 31.8 G/DL — LOW (ref 32–36)
MCV RBC AUTO: 95.5 FL — SIGNIFICANT CHANGE UP (ref 81–99)
MONOCYTES # BLD AUTO: 0.6 K/UL — SIGNIFICANT CHANGE UP (ref 0–0.8)
MONOCYTES NFR BLD AUTO: 8.2 % — SIGNIFICANT CHANGE UP (ref 3–10)
NEUTROPHILS # BLD AUTO: 4.4 K/UL — SIGNIFICANT CHANGE UP (ref 1.8–8)
NEUTROPHILS NFR BLD AUTO: 63 % — SIGNIFICANT CHANGE UP (ref 37–73)
PHOSPHATE SERPL-MCNC: 3.9 MG/DL — SIGNIFICANT CHANGE UP (ref 2.4–4.7)
PLATELET # BLD AUTO: 149 K/UL — LOW (ref 150–400)
POTASSIUM SERPL-MCNC: 3.8 MMOL/L — SIGNIFICANT CHANGE UP (ref 3.5–5.3)
POTASSIUM SERPL-SCNC: 3.8 MMOL/L — SIGNIFICANT CHANGE UP (ref 3.5–5.3)
RBC # BLD: 2.87 M/UL — LOW (ref 4.4–5.2)
RBC # FLD: 13.3 % — SIGNIFICANT CHANGE UP (ref 11–15.6)
SODIUM SERPL-SCNC: 142 MMOL/L — SIGNIFICANT CHANGE UP (ref 135–145)
WBC # BLD: 6.9 K/UL — SIGNIFICANT CHANGE UP (ref 4.8–10.8)
WBC # FLD AUTO: 6.9 K/UL — SIGNIFICANT CHANGE UP (ref 4.8–10.8)

## 2017-11-14 PROCEDURE — 99233 SBSQ HOSP IP/OBS HIGH 50: CPT

## 2017-11-14 PROCEDURE — 99232 SBSQ HOSP IP/OBS MODERATE 35: CPT

## 2017-11-14 RX ORDER — INFLUENZA VIRUS VACCINE 15; 15; 15; 15 UG/.5ML; UG/.5ML; UG/.5ML; UG/.5ML
0.5 SUSPENSION INTRAMUSCULAR ONCE
Qty: 0 | Refills: 0 | Status: COMPLETED | OUTPATIENT
Start: 2017-11-14 | End: 2017-11-14

## 2017-11-14 RX ORDER — ALTEPLASE 100 MG
2 KIT INTRAVENOUS ONCE
Qty: 0 | Refills: 0 | Status: COMPLETED | OUTPATIENT
Start: 2017-11-14 | End: 2017-11-14

## 2017-11-14 RX ORDER — TAMSULOSIN HYDROCHLORIDE 0.4 MG/1
0.4 CAPSULE ORAL
Qty: 0 | Refills: 0 | Status: DISCONTINUED | OUTPATIENT
Start: 2017-11-14 | End: 2017-11-15

## 2017-11-14 RX ORDER — PREDNISOLONE 5 MG
1 TABLET ORAL THREE TIMES A DAY
Qty: 0 | Refills: 0 | Status: DISCONTINUED | OUTPATIENT
Start: 2017-11-14 | End: 2017-11-15

## 2017-11-14 RX ORDER — HEPARIN SODIUM 5000 [USP'U]/ML
300 INJECTION INTRAVENOUS; SUBCUTANEOUS ONCE
Qty: 0 | Refills: 0 | Status: DISCONTINUED | OUTPATIENT
Start: 2017-11-14 | End: 2017-11-14

## 2017-11-14 RX ORDER — INSULIN LISPRO 100/ML
5 VIAL (ML) SUBCUTANEOUS ONCE
Qty: 0 | Refills: 0 | Status: COMPLETED | OUTPATIENT
Start: 2017-11-14 | End: 2017-11-14

## 2017-11-14 RX ADMIN — ATORVASTATIN CALCIUM 80 MILLIGRAM(S): 80 TABLET, FILM COATED ORAL at 22:53

## 2017-11-14 RX ADMIN — Medication 10 MILLIGRAM(S): at 13:14

## 2017-11-14 RX ADMIN — CARVEDILOL PHOSPHATE 9.38 MILLIGRAM(S): 80 CAPSULE, EXTENDED RELEASE ORAL at 17:55

## 2017-11-14 RX ADMIN — SODIUM CHLORIDE 3 MILLILITER(S): 9 INJECTION INTRAMUSCULAR; INTRAVENOUS; SUBCUTANEOUS at 05:49

## 2017-11-14 RX ADMIN — Medication 40 MILLIGRAM(S): at 17:55

## 2017-11-14 RX ADMIN — RANOLAZINE 1000 MILLIGRAM(S): 500 TABLET, FILM COATED, EXTENDED RELEASE ORAL at 17:55

## 2017-11-14 RX ADMIN — INSULIN GLARGINE 70 UNIT(S): 100 INJECTION, SOLUTION SUBCUTANEOUS at 22:53

## 2017-11-14 RX ADMIN — Medication 1 DROP(S): at 22:53

## 2017-11-14 RX ADMIN — Medication 81 MILLIGRAM(S): at 13:15

## 2017-11-14 RX ADMIN — Medication 3 MILLILITER(S): at 23:20

## 2017-11-14 RX ADMIN — Medication 5 UNIT(S): at 09:46

## 2017-11-14 RX ADMIN — Medication 112 MICROGRAM(S): at 05:49

## 2017-11-14 RX ADMIN — PANTOPRAZOLE SODIUM 40 MILLIGRAM(S): 20 TABLET, DELAYED RELEASE ORAL at 05:49

## 2017-11-14 RX ADMIN — CARVEDILOL PHOSPHATE 9.38 MILLIGRAM(S): 80 CAPSULE, EXTENDED RELEASE ORAL at 05:49

## 2017-11-14 RX ADMIN — Medication 2: at 22:53

## 2017-11-14 RX ADMIN — RANOLAZINE 1000 MILLIGRAM(S): 500 TABLET, FILM COATED, EXTENDED RELEASE ORAL at 05:49

## 2017-11-14 RX ADMIN — Medication 300 UNIT(S): at 09:46

## 2017-11-14 RX ADMIN — Medication 300 MILLIGRAM(S): at 22:52

## 2017-11-14 RX ADMIN — TAMSULOSIN HYDROCHLORIDE 0.4 MILLIGRAM(S): 0.4 CAPSULE ORAL at 17:55

## 2017-11-14 RX ADMIN — Medication 2: at 13:14

## 2017-11-14 RX ADMIN — SODIUM CHLORIDE 3 MILLILITER(S): 9 INJECTION INTRAMUSCULAR; INTRAVENOUS; SUBCUTANEOUS at 22:54

## 2017-11-14 RX ADMIN — Medication 10 MILLIGRAM(S): at 05:49

## 2017-11-14 RX ADMIN — Medication 3 MILLILITER(S): at 00:47

## 2017-11-14 RX ADMIN — ISOSORBIDE MONONITRATE 60 MILLIGRAM(S): 60 TABLET, EXTENDED RELEASE ORAL at 13:15

## 2017-11-14 RX ADMIN — Medication 10 MILLIGRAM(S): at 22:52

## 2017-11-14 RX ADMIN — Medication 40 MILLIGRAM(S): at 05:49

## 2017-11-14 RX ADMIN — Medication 30 MILLILITER(S): at 09:49

## 2017-11-14 RX ADMIN — Medication 2.5 MILLIGRAM(S): at 05:49

## 2017-11-14 RX ADMIN — LORATADINE 10 MILLIGRAM(S): 10 TABLET ORAL at 13:14

## 2017-11-14 NOTE — DIETITIAN INITIAL EVALUATION ADULT. - OTHER INFO
Pt reports fair to good po intake at meals, which is normal for pt. Pt is aware of cons cho, dash/tlc diet rx and has received education in the past.  Pt declined nutrition education- offered to provide literature for her daughter to review due to pts poor vision and pt also declined.

## 2017-11-14 NOTE — PROGRESS NOTE ADULT - SUBJECTIVE AND OBJECTIVE BOX
PULMONARY PROGRESS NOTE      LAURA ROONEY-[id number]    Patient is a 62y old  Female who presents with a chief complaint of SOB (2017 02:57)      INTERVAL HPI/OVERNIGHT EVENTS:  feels sig better  no fever, chills, chest pain  MEDICATIONS  (STANDING):  alteplase for catheter clearance 2 milliGRAM(s) Catheter once  artificial  tears Solution 1 Drop(s) Both EYES daily  aspirin enteric coated 81 milliGRAM(s) Oral daily  atorvastatin 80 milliGRAM(s) Oral at bedtime  carvedilol 9.375 milliGRAM(s) Oral every 12 hours  dextrose 5%. 1000 milliLiter(s) (50 mL/Hr) IV Continuous <Continuous>  dextrose 50% Injectable 12.5 Gram(s) IV Push once  docusate sodium 100 milliGRAM(s) Oral three times a day  enalapril 2.5 milliGRAM(s) Oral daily  furosemide   Injectable 40 milliGRAM(s) IV Push two times a day  hydrALAZINE 10 milliGRAM(s) Oral three times a day  insulin glargine Injectable (LANTUS) 70 Unit(s) SubCutaneous at bedtime  insulin lispro (HumaLOG) corrective regimen sliding scale   SubCutaneous Before meals and at bedtime  isosorbide   mononitrate ER Tablet (IMDUR) 60 milliGRAM(s) Oral daily  levothyroxine 112 MICROGram(s) Oral daily  loratadine 10 milliGRAM(s) Oral daily  pantoprazole    Tablet 40 milliGRAM(s) Oral before breakfast  prednisoLONE sodium phosphate 1% Solution 1 Drop(s) Left EYE three times a day  ranolazine 1000 milliGRAM(s) Oral two times a day  sodium chloride 0.9% lock flush 3 milliLiter(s) IV Push every 8 hours  tamsulosin 0.4 milliGRAM(s) Oral two times a day      MEDICATIONS  (PRN):  ALBUTerol/ipratropium for Nebulization 3 milliLiter(s) Nebulizer every 6 hours PRN Shortness of Breath and/or Wheezing  aluminum hydroxide/magnesium hydroxide/simethicone Suspension 30 milliLiter(s) Oral every 6 hours PRN Dyspepsia  benzocaine 15 mG/menthol 3.6 mG Lozenge 1 Lozenge Oral every 4 hours PRN Sore Throat  dextrose Gel 1 Dose(s) Oral once PRN Blood Glucose LESS THAN 70 milliGRAM(s)/deciliter  diphenhydrAMINE   Capsule 50 milliGRAM(s) Oral every 6 hours PRN Rash and/or Itching  glucagon  Injectable 1 milliGRAM(s) IntraMuscular once PRN Glucose LESS THAN 70 milligrams/deciliter  guaiFENesin    Syrup 200 milliGRAM(s) Oral every 6 hours PRN Cough  ondansetron Injectable 4 milliGRAM(s) IV Push every 6 hours PRN Nausea      Allergies    Cipro (Unknown)  Demerol HCl (Hives)  iodine (Unknown)  iodine containing compounds (Anaphylaxis)  loperamide (Unknown)  phenylpiperidine derivatives (Unknown)  tramadol (Unknown)    Intolerances        PAST MEDICAL & SURGICAL HISTORY:  Tuberculosis: Treated at the age of 3 years  Psoriatic arthritis  Diabetic neuropathy  Blind: Left Eye  ANIKA treated with BiPAP  Acute promyelocytic leukemia  Osteoarthritis  Hypothyroidism  Hypertension  Hyperlipidemia  Asthma  Diabetes mellitus  S/P  section: x 6  S/P carpal tunnel release  S/P cervical spinal fusion  S/P cholecystectomy  H/O abdominal hysterectomy: secondry to Fibroids      SOCIAL HISTORY  Smoking History:       REVIEW OF SYSTEMS:    CONSTITUTIONAL:  No distress    HEENT:  Eyes:  No diplopia or blurred vision. ENT:  No earache, sore throat or runny nose.    CARDIOVASCULAR:  No pressure, squeezing, tightness, heaviness or aching about the chest; no palpitations.    RESPIRATORY:  see hpi    GASTROINTESTINAL:  No nausea, vomiting or diarrhea.    GENITOURINARY:  No dysuria, frequency or urgency.    NEUROLOGIC:  No paresthesias, fasciculations, seizures or weakness.    PSYCHIATRIC:  No disorder of thought or mood.    Vital Signs Last 24 Hrs  T(C): 36.7 (2017 16:39), Max: 36.7 (2017 22:12)  T(F): 98.1 (2017 16:39), Max: 98.1 (2017 16:39)  HR: 77 (2017 16:39) (71 - 81)  BP: 107/58 (2017 16:39) (107/58 - 140/72)  BP(mean): --  RR: 18 (2017 16:39) (18 - 18)  SpO2: 96% (2017 16:39) (92% - 99%)    PHYSICAL EXAMINATION:    GENERAL: The patient is awake and alert in no apparent distress.     HEENT: Head is normocephalic and atraumatic. Extraocular muscles are intact. Mucous membranes are moist.    NECK: Supple.    LUNGS: Clear to auscultation without wheezing, rales or rhonchi; respirations unlabored    HEART: Regular rate and rhythm without murmur.    ABDOMEN: Soft, nontender, and nondistended.      EXTREMITIES: Without any cyanosis, clubbing, rash, lesions or edema.    NEUROLOGIC: Grossly intact.    LABS:                        10.2   7.0   )-----------( 151      ( 2017 23:14 )             32.7         139  |  105  |  27.0<H>  ----------------------------<  282<H>  4.3   |  22.0  |  1.24    Ca    8.8      2017 23:14    TPro  7.2  /  Alb  3.7  /  TBili  0.3<L>  /  DBili  x   /  AST  13  /  ALT  12  /  AlkPhos  118      PT/INR - ( 2017 23:14 )   PT: 12.0 sec;   INR: 1.09 ratio         PTT - ( 2017 23:14 )  PTT:29.2 sec    ABG - ( 2017 22:59 )  pH: 7.29  /  pCO2: 48    /  pO2: 159   / HCO3: 21    / Base Excess: -3.7  /  SaO2: 97                CARDIAC MARKERS ( 2017 18:03 )  x     / 0.04 ng/mL / x     / x     / x      CARDIAC MARKERS ( 2017 06:16 )  x     / 0.02 ng/mL / x     / x     / x      CARDIAC MARKERS ( 2017 23:14 )  x     / <0.01 ng/mL / x     / x     / x            Serum Pro-Brain Natriuretic Peptide: 3701 pg/mL (17 @ 23:14)          MICROBIOLOGY:    RADIOLOGY & ADDITIONAL STUDIES:

## 2017-11-14 NOTE — PROGRESS NOTE ADULT - SUBJECTIVE AND OBJECTIVE BOX
HPI:  63 y/o female with hx of ICM with chronic systolic CHF s/p LHC in 7/17 with multiple high grade calcified plaques not amenable to intervention or CTS, DM, obesity, hypothyroid, ANIKA on nocturnal CPAP, HTN, HLD, p/w increasing SOB.  Patient states she started feeling ill about 1 week ago with URI symptoms and then was having some green sputum.  Patient was seen by PMD and started abx but did not improve.  She was also using nebs withouit relief.  yesterday SOB became significantly worse, a/w chest pressure and came to ER.  Patient given lasix and steroids, had been placed on BiPAP initially, but is feeling much better today.  No further CP.  Still with some orthopnea.  Patient also had cough and some chills, but no clear fever.  Denies palps, syncope.    She states she sought a second opinion about her CAD with Dr. Hayes at [place] and as of now has an appt. for cardiac cath and atherectomy on 11/29.     BP: 138/70   BP(mean): 116 (13 Nov 2017 02:57) (116 - 116)  RR: 20 (13 Nov 2017 06:55) (19 - 30)  SpO2: 100% (13 Nov 2017 06:55) (71% - 100%)  General: Patient comfortable in NAD  HEENT: NCAT, mmm, EOMI  Neck: ?JVD, no carotid bruits  CVS: nl s1, split s2, no s3, no murmur or rubs, RRR, distant  Chest: CTA b/l, diminished at bases  Abdomen: soft, nt/nd  Extremities: trace edema LE's  Neuro: A&O x3  Psych: Normal affect

## 2017-11-14 NOTE — PROGRESS NOTE ADULT - ASSESSMENT
Assessment:  61 y/o female with hx of ICM with chronic systolic CHF s/p C in 7/17 with multiple high grade calcified plaques not amenable to intervention or CTS, DM, obesity, hypothyroid, ANIKA on nocturnal CPAP, HTN, HLD, p/w increasing SOB.  Patient with recent URI likely precipitating CHF exacerbation.  Associated CP, ?anginal, trops negative  Improved with lasix.    A little more SOB, weakness reported today; OOB to chair;  To evaluate pic line which may be malfunctioning.    Plan:  1. Continue current medical regimen.  2. Lasix IV BID  3. Resume other CV meds in doses as above  4. Agree with addition of ACEi  5. Ultimately plan for patient to f/u at [place] for coronary atherectomy

## 2017-11-14 NOTE — PROGRESS NOTE ADULT - SUBJECTIVE AND OBJECTIVE BOX
chief complaint of SOB (2017 02:57)    63 y/o female with hx of chronic systolic CHF with EF of 35 % due to ICM s/p LHC in  with multiple high grade calcified plaques not amenable to intervention or CT sx as per Cardiology reporting, Obesity, hypothyroid, ANIKA on nocturnal Cpap of 12, HTN, HLD, GERD, DM-2, psoriasis, Asthma, Peripheral neuropathy due to DM, presents to ED with increasing SOB, increasing TALBERT to the point where she has to place chairs every 6 feet in her house because she can not walk more than 6 feet without being winded and fatigued. She describes the SOB as pressure like and air hunger. She notes she started feeling ill about 1 week ago with URI symptoms and then was having some green sputum. No pleurisy or fevers. She has had no sick contacts, or travel. She ambulates with cane at home and has a wheel chair for when she goes out. She states she sought a second opinion about her CAD with Dr. Hayes at [place] and as of now has an appt. for cardiac cath and atherectomy on . In the ED patient with moderate resp. distress with increased WOB needing Bipap. ABG with hypercapnic resp. failure. Patient also reports taking 4 days of old supply of cephalexin without any improvement in symptoms. (2017 02:57)    Pt notes improved sob. Now off oxygen. chemoport unable to be acessed.  alteplase.     ROS:  no fevers  +chills  no chest pain  +cough    PAST MEDICAL & SURGICAL HISTORY:  Tuberculosis: Treated at the age of 3 years  Psoriatic arthritis  Diabetic neuropathy  Blind: Left Eye  ANIKA treated with BiPAP  Acute promyelocytic leukemia  Osteoarthritis  Hypothyroidism  Hypertension  Hyperlipidemia  Asthma  Diabetes mellitus  S/P  section: x 6  S/P carpal tunnel release  S/P cervical spinal fusion  S/P cholecystectomy  H/O abdominal hysterectomy: secondry to Fibroids    MEDICATIONS  (STANDING):  aspirin enteric coated 81 milliGRAM(s) Oral daily  atorvastatin 80 milliGRAM(s) Oral at bedtime  carvedilol 9.375 milliGRAM(s) Oral every 12 hours  dextrose 5%. 1000 milliLiter(s) (50 mL/Hr) IV Continuous <Continuous>  dextrose 50% Injectable 12.5 Gram(s) IV Push once  docusate sodium 100 milliGRAM(s) Oral three times a day  enalapril 2.5 milliGRAM(s) Oral daily  furosemide   Injectable 40 milliGRAM(s) IV Push two times a day  hydrALAZINE 10 milliGRAM(s) Oral three times a day  insulin glargine Injectable (LANTUS) 70 Unit(s) SubCutaneous at bedtime  insulin lispro (HumaLOG) corrective regimen sliding scale   SubCutaneous Before meals and at bedtime  isosorbide   mononitrate ER Tablet (IMDUR) 60 milliGRAM(s) Oral daily  levothyroxine 112 MICROGram(s) Oral daily  loratadine 10 milliGRAM(s) Oral daily  pantoprazole    Tablet 40 milliGRAM(s) Oral before breakfast  pregabalin 300 milliGRAM(s) Oral at bedtime  pregabalin 150 milliGRAM(s) Oral daily  ranolazine 1000 milliGRAM(s) Oral two times a day  sodium chloride 0.9% lock flush 3 milliLiter(s) IV Push every 8 hours  tamsulosin 0.4 milliGRAM(s) Oral at bedtime    MEDICATIONS  (PRN):  ALBUTerol/ipratropium for Nebulization 3 milliLiter(s) Nebulizer every 6 hours PRN Shortness of Breath and/or Wheezing  aluminum hydroxide/magnesium hydroxide/simethicone Suspension 30 milliLiter(s) Oral every 6 hours PRN Dyspepsia  benzocaine 15 mG/menthol 3.6 mG Lozenge 1 Lozenge Oral every 4 hours PRN Sore Throat  dextrose Gel 1 Dose(s) Oral once PRN Blood Glucose LESS THAN 70 milliGRAM(s)/deciliter  diphenhydrAMINE   Capsule 50 milliGRAM(s) Oral every 6 hours PRN Rash and/or Itching  glucagon  Injectable 1 milliGRAM(s) IntraMuscular once PRN Glucose LESS THAN 70 milligrams/deciliter  guaiFENesin    Syrup 200 milliGRAM(s) Oral every 6 hours PRN Cough  ondansetron Injectable 4 milliGRAM(s) IV Push every 6 hours PRN Nausea    EXAM:  Vital Signs Last 24 Hrs  T(C): 36.4 (2017 13:18), Max: 36.9 (2017 16:46)  T(F): 97.5 (2017 13:18), Max: 98.4 (2017 16:46)  HR: 78 (2017 13:18) (71 - 82)  BP: 140/72 (2017 13:18) (119/59 - 140/72)  BP(mean): --  RR: 18 (2017 13:18) (18 - 18)  SpO2: 96% (2017 13:18) (92% - 99%)     @ 07:01  -   @ 07:00  --------------------------------------------------------  IN: 740 mL / OUT: 2550 mL / NET: -1810 mL    GENERAL NAD,   HEENT: NORMAL CEPHALIC ATRAUMATIC, EOMI, MOIST MUCUS MEBRANES  NECK SUPPLE  CVS S1S2, RRR,   RESP BLCTA, NO RHONCHI  ABD SOFT, NON TENDER, NON DISTENDED  EXT NO EDEMA  NEURO MOVES ALL 4 EXTREMITES  PSYCH APPROPRIATE MOOD  SKIN WARM, DRY    LABS:  PT/INR - ( 2017 23:14 )   PT: 12.0 sec;   INR: 1.09 ratio    PTT - ( 2017 23:14 )  PTT:29.2 sec  LIVER FUNCTIONS - ( 2017 23:14 )  Alb: 3.7 g/dL / Pro: 7.2 g/dL / ALK PHOS: 118 U/L / ALT: 12 U/L / AST: 13 U/L / GGT: x                               10.2   7.0   )-----------( 151      ( 2017 23:14 )             32.7   12  139  |  105  |  27.0<H>  ----------------------------<  282<H>  4.3   |  22.0  |  1.24  Ca    8.8      2017 23:14    TPro  7.2  /  Alb  3.7  /  TBili  0.3<L>  /  DBili  x   /  AST  13  /  ALT  12  /  AlkPhos  118  11-12  CARDIAC MARKERS ( 2017 18:03 )  x     / 0.04 ng/mL / x     / x     / x      CARDIAC MARKERS ( 2017 06:16 )  x     / 0.02 ng/mL / x     / x     / x      CARDIAC MARKERS ( 2017 23:14 )  x     / <0.01 ng/mL / x     / x     / x chief complaint of SOB (2017 02:57)    63 y/o female with hx of chronic systolic CHF with EF of 35 % due to ICM s/p LHC in  with multiple high grade calcified plaques not amenable to intervention or CT sx as per Cardiology reporting, Obesity, hypothyroid, ANIKA on nocturnal Cpap of 12, HTN, HLD, GERD, DM-2, psoriasis, Asthma, Peripheral neuropathy due to DM, presents to ED with increasing SOB, increasing TALBERT to the point where she has to place chairs every 6 feet in her house because she can not walk more than 6 feet without being winded and fatigued. She describes the SOB as pressure like and air hunger. She notes she started feeling ill about 1 week ago with URI symptoms and then was having some green sputum. No pleurisy or fevers. She has had no sick contacts, or travel. She ambulates with cane at home and has a wheel chair for when she goes out. She states she sought a second opinion about her CAD with Dr. Hayes at [place] and as of now has an appt. for cardiac cath and atherectomy on . In the ED patient with moderate resp. distress with increased WOB needing Bipap. ABG with hypercapnic resp. failure. Patient also reports taking 4 days of old supply of cephalexin without any improvement in symptoms. (2017 02:57)    Pt notes improved sob. Now off oxygen. chemoport unable to be accessed.  alteplase.     ROS:  no fevers  +chills  no chest pain  +cough    PAST MEDICAL & SURGICAL HISTORY:  Tuberculosis: Treated at the age of 3 years  Psoriatic arthritis  Diabetic neuropathy  Blind: Left Eye  ANIKA treated with BiPAP  Acute promyelocytic leukemia  Osteoarthritis  Hypothyroidism  Hypertension  Hyperlipidemia  Asthma  Diabetes mellitus  S/P  section: x 6  S/P carpal tunnel release  S/P cervical spinal fusion  S/P cholecystectomy  H/O abdominal hysterectomy: secondry to Fibroids    MEDICATIONS  (STANDING):  aspirin enteric coated 81 milliGRAM(s) Oral daily  atorvastatin 80 milliGRAM(s) Oral at bedtime  carvedilol 9.375 milliGRAM(s) Oral every 12 hours  dextrose 5%. 1000 milliLiter(s) (50 mL/Hr) IV Continuous <Continuous>  dextrose 50% Injectable 12.5 Gram(s) IV Push once  docusate sodium 100 milliGRAM(s) Oral three times a day  enalapril 2.5 milliGRAM(s) Oral daily  furosemide   Injectable 40 milliGRAM(s) IV Push two times a day  hydrALAZINE 10 milliGRAM(s) Oral three times a day  insulin glargine Injectable (LANTUS) 70 Unit(s) SubCutaneous at bedtime  insulin lispro (HumaLOG) corrective regimen sliding scale   SubCutaneous Before meals and at bedtime  isosorbide   mononitrate ER Tablet (IMDUR) 60 milliGRAM(s) Oral daily  levothyroxine 112 MICROGram(s) Oral daily  loratadine 10 milliGRAM(s) Oral daily  pantoprazole    Tablet 40 milliGRAM(s) Oral before breakfast  pregabalin 300 milliGRAM(s) Oral at bedtime  pregabalin 150 milliGRAM(s) Oral daily  ranolazine 1000 milliGRAM(s) Oral two times a day  sodium chloride 0.9% lock flush 3 milliLiter(s) IV Push every 8 hours  tamsulosin 0.4 milliGRAM(s) Oral at bedtime    MEDICATIONS  (PRN):  ALBUTerol/ipratropium for Nebulization 3 milliLiter(s) Nebulizer every 6 hours PRN Shortness of Breath and/or Wheezing  aluminum hydroxide/magnesium hydroxide/simethicone Suspension 30 milliLiter(s) Oral every 6 hours PRN Dyspepsia  benzocaine 15 mG/menthol 3.6 mG Lozenge 1 Lozenge Oral every 4 hours PRN Sore Throat  dextrose Gel 1 Dose(s) Oral once PRN Blood Glucose LESS THAN 70 milliGRAM(s)/deciliter  diphenhydrAMINE   Capsule 50 milliGRAM(s) Oral every 6 hours PRN Rash and/or Itching  glucagon  Injectable 1 milliGRAM(s) IntraMuscular once PRN Glucose LESS THAN 70 milligrams/deciliter  guaiFENesin    Syrup 200 milliGRAM(s) Oral every 6 hours PRN Cough  ondansetron Injectable 4 milliGRAM(s) IV Push every 6 hours PRN Nausea    EXAM:  Vital Signs Last 24 Hrs  T(C): 36.4 (2017 13:18), Max: 36.9 (2017 16:46)  T(F): 97.5 (2017 13:18), Max: 98.4 (2017 16:46)  HR: 78 (2017 13:18) (71 - 82)  BP: 140/72 (2017 13:18) (119/59 - 140/72)  BP(mean): --  RR: 18 (2017 13:18) (18 - 18)  SpO2: 96% (2017 13:18) (92% - 99%)    GENERAL NAD,   HEENT: NORMAL CEPHALIC ATRAUMATIC, EOMI, MOIST MUCUS MEBRANES  NECK SUPPLE  CVS S1S2, RRR,   RESP BLCTA, NO RHONCHI  ABD SOFT, NON TENDER, NON DISTENDED  EXT NO EDEMA  NEURO LLE WITH KNEE BRACE  PSYCH APPROPRIATE MOOD  SKIN WARM, DRY    LABS:  PT/INR - ( 2017 23:14 )   PT: 12.0 sec;   INR: 1.09 ratio    PTT - ( 2017 23:14 )  PTT:29.2 sec  LIVER FUNCTIONS - ( 2017 23:14 )  Alb: 3.7 g/dL / Pro: 7.2 g/dL / ALK PHOS: 118 U/L / ALT: 12 U/L / AST: 13 U/L / GGT: x                               10.2   7.0   )-----------( 151      ( 2017 23:14 )             32.7   11-12  139  |  105  |  27.0<H>  ----------------------------<  282<H>  4.3   |  22.0  |  1.24  Ca    8.8      2017 23:14    TPro  7.2  /  Alb  3.7  /  TBili  0.3<L>  /  DBili  x   /  AST  13  /  ALT  12  /  AlkPhos  118  11-12  CARDIAC MARKERS ( 2017 18:03 )  x     / 0.04 ng/mL / x     / x     / x      CARDIAC MARKERS ( 2017 06:16 )  x     / 0.02 ng/mL / x     / x     / x      CARDIAC MARKERS ( 2017 23:14 )  x     / <0.01 ng/mL / x     / x     / x chief complaint of SOB (2017 02:57)    63 y/o female with hx of chronic systolic CHF with EF of 35 % due to ICM s/p LHC in  with multiple high grade calcified plaques not amenable to intervention or CT sx as per Cardiology reporting, Obesity, hypothyroid, ANIKA on nocturnal Cpap of 12, HTN, HLD, GERD, DM-2, psoriasis, Asthma, Peripheral neuropathy due to DM, presents to ED with increasing SOB, increasing TALBERT to the point where she has to place chairs every 6 feet in her house because she can not walk more than 6 feet without being winded and fatigued. She describes the SOB as pressure like and air hunger. She notes she started feeling ill about 1 week ago with URI symptoms and then was having some green sputum. No pleurisy or fevers. She has had no sick contacts, or travel. She ambulates with cane at home and has a wheel chair for when she goes out. She states she sought a second opinion about her CAD with Dr. Hayes at [place] and as of now has an appt. for cardiac cath and atherectomy on . In the ED patient with moderate resp. distress with increased WOB needing Bipap. ABG with hypercapnic resp. failure. Patient also reports taking 4 days of old supply of cephalexin without any improvement in symptoms. (2017 02:57)    Pt notes improved sob. Now off oxygen. chemoport unable to be accessed for labs.      ROS:  no fevers  +chills  no chest pain  +cough    PAST MEDICAL & SURGICAL HISTORY:  Tuberculosis: Treated at the age of 3 years  Psoriatic arthritis  Diabetic neuropathy  Blind: Left Eye  ANIKA treated with BiPAP  Acute promyelocytic leukemia  Osteoarthritis  Hypothyroidism  Hypertension  Hyperlipidemia  Asthma  Diabetes mellitus  S/P  section: x 6  S/P carpal tunnel release  S/P cervical spinal fusion  S/P cholecystectomy  H/O abdominal hysterectomy: secondry to Fibroids    MEDICATIONS  (STANDING):  aspirin enteric coated 81 milliGRAM(s) Oral daily  atorvastatin 80 milliGRAM(s) Oral at bedtime  carvedilol 9.375 milliGRAM(s) Oral every 12 hours  dextrose 5%. 1000 milliLiter(s) (50 mL/Hr) IV Continuous <Continuous>  dextrose 50% Injectable 12.5 Gram(s) IV Push once  docusate sodium 100 milliGRAM(s) Oral three times a day  enalapril 2.5 milliGRAM(s) Oral daily  furosemide   Injectable 40 milliGRAM(s) IV Push two times a day  hydrALAZINE 10 milliGRAM(s) Oral three times a day  insulin glargine Injectable (LANTUS) 70 Unit(s) SubCutaneous at bedtime  insulin lispro (HumaLOG) corrective regimen sliding scale   SubCutaneous Before meals and at bedtime  isosorbide   mononitrate ER Tablet (IMDUR) 60 milliGRAM(s) Oral daily  levothyroxine 112 MICROGram(s) Oral daily  loratadine 10 milliGRAM(s) Oral daily  pantoprazole    Tablet 40 milliGRAM(s) Oral before breakfast  pregabalin 300 milliGRAM(s) Oral at bedtime  pregabalin 150 milliGRAM(s) Oral daily  ranolazine 1000 milliGRAM(s) Oral two times a day  sodium chloride 0.9% lock flush 3 milliLiter(s) IV Push every 8 hours  tamsulosin 0.4 milliGRAM(s) Oral at bedtime    MEDICATIONS  (PRN):  ALBUTerol/ipratropium for Nebulization 3 milliLiter(s) Nebulizer every 6 hours PRN Shortness of Breath and/or Wheezing  aluminum hydroxide/magnesium hydroxide/simethicone Suspension 30 milliLiter(s) Oral every 6 hours PRN Dyspepsia  benzocaine 15 mG/menthol 3.6 mG Lozenge 1 Lozenge Oral every 4 hours PRN Sore Throat  dextrose Gel 1 Dose(s) Oral once PRN Blood Glucose LESS THAN 70 milliGRAM(s)/deciliter  diphenhydrAMINE   Capsule 50 milliGRAM(s) Oral every 6 hours PRN Rash and/or Itching  glucagon  Injectable 1 milliGRAM(s) IntraMuscular once PRN Glucose LESS THAN 70 milligrams/deciliter  guaiFENesin    Syrup 200 milliGRAM(s) Oral every 6 hours PRN Cough  ondansetron Injectable 4 milliGRAM(s) IV Push every 6 hours PRN Nausea    EXAM:  Vital Signs Last 24 Hrs  T(C): 36.4 (2017 13:18), Max: 36.9 (2017 16:46)  T(F): 97.5 (2017 13:18), Max: 98.4 (2017 16:46)  HR: 78 (2017 13:18) (71 - 82)  BP: 140/72 (2017 13:18) (119/59 - 140/72)  BP(mean): --  RR: 18 (2017 13:18) (18 - 18)  SpO2: 96% (2017 13:18) (92% - 99%)    GENERAL NAD,   HEENT: NORMAL CEPHALIC ATRAUMATIC, EOMI, MOIST MUCUS MEMBRANES  NECK SUPPLE  CVS S1S2, RRR,   RESP BLCTA, NO RHONCHI  ABD SOFT, NON TENDER, NON DISTENDED  EXT NO EDEMA  NEURO LLE WITH KNEE BRACE  PSYCH APPROPRIATE MOOD  SKIN WARM, DRY    LABS:  PT/INR - ( 2017 23:14 )   PT: 12.0 sec;   INR: 1.09 ratio    PTT - ( 2017 23:14 )  PTT:29.2 sec  LIVER FUNCTIONS - ( 2017 23:14 )  Alb: 3.7 g/dL / Pro: 7.2 g/dL / ALK PHOS: 118 U/L / ALT: 12 U/L / AST: 13 U/L / GGT: x                               10.2   7.0   )-----------( 151      ( 2017 23:14 )             32.7   11-12  139  |  105  |  27.0<H>  ----------------------------<  282<H>  4.3   |  22.0  |  1.24  Ca    8.8      2017 23:14    TPro  7.2  /  Alb  3.7  /  TBili  0.3<L>  /  DBili  x   /  AST  13  /  ALT  12  /  AlkPhos  118  11-12  CARDIAC MARKERS ( 2017 18:03 )  x     / 0.04 ng/mL / x     / x     / x      CARDIAC MARKERS ( 2017 06:16 )  x     / 0.02 ng/mL / x     / x     / x      CARDIAC MARKERS ( 2017 23:14 )  x     / <0.01 ng/mL / x     / x     / x

## 2017-11-14 NOTE — DIETITIAN INITIAL EVALUATION ADULT. - PROBLEM SELECTOR PLAN 3
Cont. o/p regimen. Consider increasing imdur for more antianginal effects. F/U with Dr. Hayes on 11/29 for intervention as stated above.

## 2017-11-14 NOTE — PROGRESS NOTE ADULT - ASSESSMENT
acute hypercapnic ventilatory failure secondary to CHF  much improved clinically post diuretics, now oxygenating on room air  known tight LAD lesion, cardiomyopathy by hx, BNP and CXR consistent  getting atherectomy in [facility] end of month  underlying obesity, ANIKA, last PFT without air flow obstruction  will address bipap, does not meet trilogy criteria, wean back to cpap 12, follow up sleep study as out pt  Cardiology input, check troponin, echo  prn nebs,   will follow up prn

## 2017-11-14 NOTE — CHART NOTE - NSCHARTNOTEFT_GEN_A_CORE
Aware pt with T2DM for many years, pt states being insulin resistant. Current hgba1c not available, but pt states she normally ranges between 9-10%.  Pt takes Tresiba 70 u daily and up to 45 u Novolog before meals.  Pt is mostly blind, but has her daughter to help monitor BG and administer insulin.  Pt is able to count the pen clicks to give herself insulin when her daughter is not available.  Pt does admit to occasional episodes of hypoglycemia in which she treats with "sugar-" reinforced s/s and correct treatment for low blood sugar.  Pt seems to have an overall good understanding of how to manage diabetes, but has difficulty controlling BG when she is on steroids or has an infection.  Pt also has limited mobility.  Reviewed overall diabetes self management with pt and discussed importance of meal timing to prevent hypoglycemia.  Pt follows up regularly with her PMD for ongoing diabetes management.  Pt receptive to information discussed, but declined written literature as she has received in the past.  RD CDE to remain available.    Recommendations:  Check hgba1c  Continue with diabetes self management education.

## 2017-11-15 ENCOUNTER — TRANSCRIPTION ENCOUNTER (OUTPATIENT)
Age: 62
End: 2017-11-15

## 2017-11-15 VITALS — SYSTOLIC BLOOD PRESSURE: 110 MMHG | HEART RATE: 71 BPM | DIASTOLIC BLOOD PRESSURE: 60 MMHG

## 2017-11-15 LAB
ANION GAP SERPL CALC-SCNC: 11 MMOL/L — SIGNIFICANT CHANGE UP (ref 5–17)
BUN SERPL-MCNC: 42 MG/DL — HIGH (ref 8–20)
CALCIUM SERPL-MCNC: 8.7 MG/DL — SIGNIFICANT CHANGE UP (ref 8.6–10.2)
CHLORIDE SERPL-SCNC: 105 MMOL/L — SIGNIFICANT CHANGE UP (ref 98–107)
CO2 SERPL-SCNC: 28 MMOL/L — SIGNIFICANT CHANGE UP (ref 22–29)
CREAT SERPL-MCNC: 1.29 MG/DL — SIGNIFICANT CHANGE UP (ref 0.5–1.3)
GLUCOSE BLDC GLUCOMTR-MCNC: 164 MG/DL — HIGH (ref 70–99)
GLUCOSE BLDC GLUCOMTR-MCNC: 192 MG/DL — HIGH (ref 70–99)
GLUCOSE BLDC GLUCOMTR-MCNC: 213 MG/DL — HIGH (ref 70–99)
GLUCOSE SERPL-MCNC: 167 MG/DL — HIGH (ref 70–115)
HBA1C BLD-MCNC: 6.7 % — HIGH (ref 4–5.6)
HCT VFR BLD CALC: 28.4 % — LOW (ref 37–47)
HGB BLD-MCNC: 8.8 G/DL — LOW (ref 12–16)
MCHC RBC-ENTMCNC: 29.9 PG — SIGNIFICANT CHANGE UP (ref 27–31)
MCHC RBC-ENTMCNC: 31 G/DL — LOW (ref 32–36)
MCV RBC AUTO: 96.6 FL — SIGNIFICANT CHANGE UP (ref 81–99)
PLATELET # BLD AUTO: 155 K/UL — SIGNIFICANT CHANGE UP (ref 150–400)
POTASSIUM SERPL-MCNC: 4 MMOL/L — SIGNIFICANT CHANGE UP (ref 3.5–5.3)
POTASSIUM SERPL-SCNC: 4 MMOL/L — SIGNIFICANT CHANGE UP (ref 3.5–5.3)
RBC # BLD: 2.94 M/UL — LOW (ref 4.4–5.2)
RBC # FLD: 13.4 % — SIGNIFICANT CHANGE UP (ref 11–15.6)
SODIUM SERPL-SCNC: 144 MMOL/L — SIGNIFICANT CHANGE UP (ref 135–145)
WBC # BLD: 5.9 K/UL — SIGNIFICANT CHANGE UP (ref 4.8–10.8)
WBC # FLD AUTO: 5.9 K/UL — SIGNIFICANT CHANGE UP (ref 4.8–10.8)

## 2017-11-15 PROCEDURE — 82803 BLOOD GASES ANY COMBINATION: CPT

## 2017-11-15 PROCEDURE — 83880 ASSAY OF NATRIURETIC PEPTIDE: CPT

## 2017-11-15 PROCEDURE — 97163 PT EVAL HIGH COMPLEX 45 MIN: CPT

## 2017-11-15 PROCEDURE — 93306 TTE W/DOPPLER COMPLETE: CPT

## 2017-11-15 PROCEDURE — 99291 CRITICAL CARE FIRST HOUR: CPT | Mod: 25

## 2017-11-15 PROCEDURE — 85027 COMPLETE CBC AUTOMATED: CPT

## 2017-11-15 PROCEDURE — 93005 ELECTROCARDIOGRAM TRACING: CPT

## 2017-11-15 PROCEDURE — 84484 ASSAY OF TROPONIN QUANT: CPT

## 2017-11-15 PROCEDURE — 87486 CHLMYD PNEUM DNA AMP PROBE: CPT

## 2017-11-15 PROCEDURE — 96374 THER/PROPH/DIAG INJ IV PUSH: CPT

## 2017-11-15 PROCEDURE — 85730 THROMBOPLASTIN TIME PARTIAL: CPT

## 2017-11-15 PROCEDURE — 80053 COMPREHEN METABOLIC PANEL: CPT

## 2017-11-15 PROCEDURE — 85610 PROTHROMBIN TIME: CPT

## 2017-11-15 PROCEDURE — 93306 TTE W/DOPPLER COMPLETE: CPT | Mod: 26

## 2017-11-15 PROCEDURE — 83735 ASSAY OF MAGNESIUM: CPT

## 2017-11-15 PROCEDURE — 83036 HEMOGLOBIN GLYCOSYLATED A1C: CPT

## 2017-11-15 PROCEDURE — 87798 DETECT AGENT NOS DNA AMP: CPT

## 2017-11-15 PROCEDURE — 84100 ASSAY OF PHOSPHORUS: CPT

## 2017-11-15 PROCEDURE — 99238 HOSP IP/OBS DSCHRG MGMT 30/<: CPT

## 2017-11-15 PROCEDURE — 94640 AIRWAY INHALATION TREATMENT: CPT

## 2017-11-15 PROCEDURE — 71045 X-RAY EXAM CHEST 1 VIEW: CPT

## 2017-11-15 PROCEDURE — 87633 RESP VIRUS 12-25 TARGETS: CPT

## 2017-11-15 PROCEDURE — 36415 COLL VENOUS BLD VENIPUNCTURE: CPT

## 2017-11-15 PROCEDURE — 82962 GLUCOSE BLOOD TEST: CPT

## 2017-11-15 PROCEDURE — 94660 CPAP INITIATION&MGMT: CPT

## 2017-11-15 PROCEDURE — 87581 M.PNEUMON DNA AMP PROBE: CPT

## 2017-11-15 PROCEDURE — 96375 TX/PRO/DX INJ NEW DRUG ADDON: CPT

## 2017-11-15 PROCEDURE — 80048 BASIC METABOLIC PNL TOTAL CA: CPT

## 2017-11-15 RX ORDER — HYDRALAZINE HCL 50 MG
1 TABLET ORAL
Qty: 0 | Refills: 0 | COMMUNITY
Start: 2017-11-15

## 2017-11-15 RX ORDER — PANTOPRAZOLE SODIUM 20 MG/1
1 TABLET, DELAYED RELEASE ORAL
Qty: 0 | Refills: 0 | COMMUNITY
Start: 2017-11-15

## 2017-11-15 RX ORDER — PANTOPRAZOLE SODIUM 20 MG/1
1 TABLET, DELAYED RELEASE ORAL
Qty: 0 | Refills: 0 | COMMUNITY

## 2017-11-15 RX ORDER — FUROSEMIDE 40 MG
1 TABLET ORAL
Qty: 60 | Refills: 0 | OUTPATIENT
Start: 2017-11-15 | End: 2017-12-15

## 2017-11-15 RX ORDER — LEVOTHYROXINE SODIUM 125 MCG
1 TABLET ORAL
Qty: 0 | Refills: 0 | COMMUNITY
Start: 2017-11-15

## 2017-11-15 RX ORDER — FUROSEMIDE 40 MG
40 TABLET ORAL DAILY
Qty: 0 | Refills: 0 | Status: DISCONTINUED | OUTPATIENT
Start: 2017-11-15 | End: 2017-11-15

## 2017-11-15 RX ORDER — INSULIN GLARGINE 100 [IU]/ML
70 INJECTION, SOLUTION SUBCUTANEOUS
Qty: 0 | Refills: 0 | COMMUNITY
Start: 2017-11-15

## 2017-11-15 RX ORDER — TAMSULOSIN HYDROCHLORIDE 0.4 MG/1
1 CAPSULE ORAL
Qty: 0 | Refills: 0 | COMMUNITY
Start: 2017-11-15

## 2017-11-15 RX ORDER — LEVOTHYROXINE SODIUM 125 MCG
1 TABLET ORAL
Qty: 30 | Refills: 0 | COMMUNITY

## 2017-11-15 RX ORDER — ISOSORBIDE MONONITRATE 60 MG/1
1 TABLET, EXTENDED RELEASE ORAL
Qty: 0 | Refills: 0 | COMMUNITY
Start: 2017-11-15

## 2017-11-15 RX ORDER — PREDNISOLONE 5 MG
1 TABLET ORAL
Qty: 0 | Refills: 0 | COMMUNITY
Start: 2017-11-15

## 2017-11-15 RX ORDER — HEPARIN SODIUM 5000 [USP'U]/ML
10 INJECTION INTRAVENOUS; SUBCUTANEOUS ONCE
Qty: 0 | Refills: 0 | Status: DISCONTINUED | OUTPATIENT
Start: 2017-11-15 | End: 2017-11-15

## 2017-11-15 RX ADMIN — Medication 2.5 MILLIGRAM(S): at 05:39

## 2017-11-15 RX ADMIN — PANTOPRAZOLE SODIUM 40 MILLIGRAM(S): 20 TABLET, DELAYED RELEASE ORAL at 05:39

## 2017-11-15 RX ADMIN — CARVEDILOL PHOSPHATE 9.38 MILLIGRAM(S): 80 CAPSULE, EXTENDED RELEASE ORAL at 05:39

## 2017-11-15 RX ADMIN — Medication 112 MICROGRAM(S): at 05:39

## 2017-11-15 RX ADMIN — ISOSORBIDE MONONITRATE 60 MILLIGRAM(S): 60 TABLET, EXTENDED RELEASE ORAL at 11:16

## 2017-11-15 RX ADMIN — SODIUM CHLORIDE 3 MILLILITER(S): 9 INJECTION INTRAMUSCULAR; INTRAVENOUS; SUBCUTANEOUS at 12:20

## 2017-11-15 RX ADMIN — Medication 300 UNIT(S): at 18:52

## 2017-11-15 RX ADMIN — Medication 81 MILLIGRAM(S): at 11:17

## 2017-11-15 RX ADMIN — Medication 10 MILLIGRAM(S): at 05:39

## 2017-11-15 RX ADMIN — CARVEDILOL PHOSPHATE 9.38 MILLIGRAM(S): 80 CAPSULE, EXTENDED RELEASE ORAL at 17:58

## 2017-11-15 RX ADMIN — Medication 10 MILLIGRAM(S): at 13:14

## 2017-11-15 RX ADMIN — TAMSULOSIN HYDROCHLORIDE 0.4 MILLIGRAM(S): 0.4 CAPSULE ORAL at 05:39

## 2017-11-15 RX ADMIN — LORATADINE 10 MILLIGRAM(S): 10 TABLET ORAL at 11:17

## 2017-11-15 RX ADMIN — Medication 1 DROP(S): at 13:14

## 2017-11-15 RX ADMIN — Medication 1 DROP(S): at 05:40

## 2017-11-15 RX ADMIN — RANOLAZINE 1000 MILLIGRAM(S): 500 TABLET, FILM COATED, EXTENDED RELEASE ORAL at 17:57

## 2017-11-15 RX ADMIN — RANOLAZINE 1000 MILLIGRAM(S): 500 TABLET, FILM COATED, EXTENDED RELEASE ORAL at 05:39

## 2017-11-15 RX ADMIN — Medication 40 MILLIGRAM(S): at 05:39

## 2017-11-15 RX ADMIN — TAMSULOSIN HYDROCHLORIDE 0.4 MILLIGRAM(S): 0.4 CAPSULE ORAL at 17:57

## 2017-11-15 RX ADMIN — Medication 1 DROP(S): at 11:16

## 2017-11-15 RX ADMIN — Medication 2: at 13:15

## 2017-11-15 RX ADMIN — SODIUM CHLORIDE 3 MILLILITER(S): 9 INJECTION INTRAMUSCULAR; INTRAVENOUS; SUBCUTANEOUS at 11:16

## 2017-11-15 RX ADMIN — Medication 2: at 08:56

## 2017-11-15 RX ADMIN — Medication 4: at 18:00

## 2017-11-15 RX ADMIN — Medication 100 MILLIGRAM(S): at 05:39

## 2017-11-15 NOTE — DISCHARGE NOTE ADULT - PLAN OF CARE
symptom control follow up with cardiologist within 1 week  cw medications  started on enalapril cw home medications pt to follow up with cardiology on outpatient. pt has cardiac cath scheduled on 11/29 with Dr Hayes.

## 2017-11-15 NOTE — PROGRESS NOTE ADULT - SUBJECTIVE AND OBJECTIVE BOX
GEMINI ROONEY  [id number]      Dyspnea  H/o or current diagnosis of HF- no contraindication to ACEI/ARBs  H/o or current diagnosis of HF- Contraindication to ACEI/ARBs  H/o or current diagnosis of HF- ACEI/ARB contraindication unknown  H/o or current diagnosis of HF- Contraindication to ACEI/ARBs  H/o or current diagnosis of HF- ACEI/ARB contraindication unknown  H/o or current diagnosis of HF- no contraindication to ACEI/ARBs  Family history of coronary artery disease (Grandparent)  Family history of blood disorder (Sibling)  Family history of cancer (Sibling)  Family history of diabetes mellitus (Sibling)  No pertinent family history in first degree relatives  Handoff  MEWS Score  Tuberculosis  Psoriatic arthritis  Diabetic neuropathy  Psoriasis  Blind  Cirrhosis  ANIKA treated with BiPAP  Acute promyelocytic leukemia  Osteoarthritis  Hypothyroidism  Hypertension  Hyperlipidemia  Asthma  Diabetes mellitus  Dyspnea  Dyspnea, unspecified type  ANIKA treated with BiPAP  Acute congestive heart failure, unspecified congestive heart failure type  Gastroesophageal reflux disease, esophagitis presence not specified  Asthma, unspecified asthma severity, unspecified whether complicated, unspecified whether persistent  Psoriatic arthritis  Diabetic polyneuropathy associated with type 2 diabetes mellitus  Essential hypertension  Hyperlipidemia, unspecified hyperlipidemia type  Type 2 diabetes mellitus with other circulatory complication, with long-term current use of insulin  Coronary artery disease of native artery of native heart with stable angina pectoris  Acute respiratory failure with hypercapnia  Acute on chronic systolic congestive heart failure  S/P  section  S/P carpal tunnel release  S/P cervical spinal fusion  S/P cholecystectomy  H/O abdominal hysterectomy  SOB  90+  CHF (congestive heart failure)  Asthma        Chief Complaint:  HPI:  61 y/o female with hx of ICM with chronic systolic CHF s/p C in  with multiple high grade calcified plaques not amenable to intervention or CTS, DM, obesity, hypothyroid, ANIKA on nocturnal CPAP, HTN, HLD, p/w increasing SOB.  Patient states she started feeling ill about 1 week ago with URI symptoms and then was having some green sputum.  Patient was seen by PMD and started abx but did not improve.  She was also using nebs withouit relief.  yesterday SOB became significantly worse, a/w chest pressure and came to ER.  Patient given lasix and steroids, had been placed on BiPAP initially, but is feeling much better today.  No further CP.  Still with some orthopnea.  Patient also had cough and some chills, but no clear fever.  Denies palps, syncope.    She states she sought a second opinion about her CAD with Dr. Hayes at [place] and as of now has an appt. for cardiac cath and atherectomy on .       Interval History:  BP running a little low today.  SOb resolved and feels like she's at baseline  Awaiting Contrast Echo  Tele:  All NSR      ALBUTerol/ipratropium for Nebulization 3 milliLiter(s) Nebulizer every 6 hours PRN  aluminum hydroxide/magnesium hydroxide/simethicone Suspension 30 milliLiter(s) Oral every 6 hours PRN  artificial  tears Solution 1 Drop(s) Both EYES daily  aspirin enteric coated 81 milliGRAM(s) Oral daily  atorvastatin 80 milliGRAM(s) Oral at bedtime  benzocaine 15 mG/menthol 3.6 mG Lozenge 1 Lozenge Oral every 4 hours PRN  carvedilol 9.375 milliGRAM(s) Oral every 12 hours  dextrose 5%. 1000 milliLiter(s) IV Continuous <Continuous>  dextrose 50% Injectable 12.5 Gram(s) IV Push once  dextrose Gel 1 Dose(s) Oral once PRN  diphenhydrAMINE   Capsule 50 milliGRAM(s) Oral every 6 hours PRN  docusate sodium 100 milliGRAM(s) Oral three times a day  enalapril 2.5 milliGRAM(s) Oral daily  furosemide   Injectable 40 milliGRAM(s) IV Push two times a day  glucagon  Injectable 1 milliGRAM(s) IntraMuscular once PRN  guaiFENesin    Syrup 200 milliGRAM(s) Oral every 6 hours PRN  hydrALAZINE 10 milliGRAM(s) Oral three times a day  insulin glargine Injectable (LANTUS) 70 Unit(s) SubCutaneous at bedtime  insulin lispro (HumaLOG) corrective regimen sliding scale   SubCutaneous Before meals and at bedtime  isosorbide   mononitrate ER Tablet (IMDUR) 60 milliGRAM(s) Oral daily  levothyroxine 112 MICROGram(s) Oral daily  loratadine 10 milliGRAM(s) Oral daily  ondansetron Injectable 4 milliGRAM(s) IV Push every 6 hours PRN  pantoprazole    Tablet 40 milliGRAM(s) Oral before breakfast  prednisoLONE sodium phosphate 1% Solution 1 Drop(s) Left EYE three times a day  pregabalin 150 milliGRAM(s) Oral daily  pregabalin 300 milliGRAM(s) Oral at bedtime  ranolazine 1000 milliGRAM(s) Oral two times a day  sodium chloride 0.9% lock flush 3 milliLiter(s) IV Push every 8 hours  tamsulosin 0.4 milliGRAM(s) Oral two times a day          Physical Exam:  T(C): 36.7 (11-15-17 @ 10:35), Max: 36.9 (17 @ 22:44)  HR: 82 (11-15-17 @ 10:35) (70 - 84)  BP: 110/68 (11-15-17 @ 11:20) (92/40 - 140/72)  RR: 18 (11-15-17 @ 10:35) (18 - 20)  SpO2: 100% (11-15-17 @ 05:23) (96% - 100%)  Wt(kg): --  General: Patient comfortable in NAD  HEENT: NCAT, mmm, EOMI  Neck: ?JVD, no carotid bruits  CVS: nl s1, split s2, no s3, no murmur or rubs, RRR, distant  Chest: CTA b/l, diminished at bases  Abdomen: soft, nt/nd  Extremities: trace edema LE's  Neuro: A&O x3  Psych: Normal affect    I&O's Summary        Labs:   15 Nov 2017 06:03    144    |  105    |  42.0   ----------------------------<  167    4.0     |  28.0   |  1.29     Ca    8.7        15 Nov 2017 06:03  Phos  3.9       2017 16:44  Mg     1.9       2017 16:44                            8.8    5.9   )-----------( 155      ( 15 Nov 2017 06:03 )             28.4       CARDIAC MARKERS ( 2017 18:03 )  x     / 0.04 ng/mL / x     / x     / x            Radiology:  New radiology reviewed    Assessment:  61 y/o female with hx of ICM with chronic systolic CHF s/p LHC in  with multiple high grade calcified plaques not amenable to intervention or CTS, DM, obesity, hypothyroid, ANIKA on nocturnal CPAP, HTN, HLD, p/w increasing SOB.  Patient with recent URI likely precipitating CHF exacerbation.  Associated CP, ?anginal, trops negative  Improved with lasix.    Less SOB, weakness reported today; OOB to chair;  pic line which  malfunction corrected    Plan:  1. Continue current medical regimen.  2. Change Lasix IV daily  3. Resume other CV meds in doses as above  4. Agree with addition of ACEi  5. Ultimately plan for patient to f/u at [place] for coronary atherectomy  6. DC planning. Would not keep just for echo if cannot be done today.

## 2017-11-15 NOTE — DISCHARGE NOTE ADULT - MEDICATION SUMMARY - MEDICATIONS TO TAKE
I will START or STAY ON the medications listed below when I get home from the hospital:    prednisoLONE  -- 1 drop(s) in the left eye 3 times a day  -- Indication: For left eye     aspirin 81 mg oral tablet  -- 1 tab(s) by mouth once a day  -- Indication: For Cad    OxyCONTIN  -- 5 milligram(s) by mouth once a day, As Needed  -- Indication: For urinary incontinance    enalapril 2.5 mg oral tablet  -- 1 tab(s) by mouth once a day  -- Indication: For CHF (congestive heart failure)    tamsulosin 0.4 mg oral capsule  -- 1 cap(s) by mouth 2 times a day  -- Indication: For urinary incontinance    isosorbide mononitrate 60 mg oral tablet, extended release  -- 1 tab(s) by mouth once a day  -- Indication: For Htn    nitroglycerin 0.4 mg sublingual tablet  -- 1 tab(s) under tongue every 5 minutes up to 3 doses daily MDD:3 tabs  -- Indication: For Cad    ranolazine 500 mg oral tablet, extended release  -- 1 tab(s) by mouth 2 times a day  -- Indication: For Cad    pregabalin 300 mg oral capsule  -- 1 cap(s) by mouth once a day (at bedtime)  -- Indication: For neuropathy    pregabalin 150 mg oral capsule  -- 1 cap(s) by mouth once a day  -- Indication: For neuropathy    Tresiba FlexTouch  -- 75 unit(s) subcutaneous once a day  -- Indication: For Dm    insulin glargine  -- 70 unit(s) subcutaneous once a day (at bedtime)  -- Indication: For Duplicate error    cetirizine 10 mg oral tablet  -- 1 tab(s) by mouth once a day  -- Indication: For Allergy    atorvastatin 80 mg oral tablet  -- 1 tab(s) by mouth once a day (at bedtime)  -- Indication: For Cad    carvedilol 6.25 mg oral tablet  -- 1 tab(s) by mouth every 12 hours  -- Indication: For Htn    albuterol 90 mcg/inh inhalation aerosol  -- 2 puff(s) inhaled every 6 hours, As Needed  -- Indication: For sob    Ventolin HFA 90 mcg/inh inhalation aerosol  -- PRN for asthma  -- Indication: For Asthma    Proventil HFA 90 mcg/inh inhalation aerosol  --  inhaled , As Needed  -- Indication: For Asthma    clotrimazole topical  -- Apply on skin to affected area , As Needed  -- Indication: For skin treatment    furosemide 40 mg oral tablet  -- 1 tab(s) by mouth once a day  -- Indication: For CHF (congestive heart failure)    ferrous sulfate 325 mg (65 mg elemental iron) oral tablet  -- 1  by mouth once a day  -- Indication: For Anemia    ocular lubricant ophthalmic solution  -- 1 drop(s) to each affected eye once a day  -- Indication: For Eyes    pantoprazole 40 mg oral delayed release tablet  -- 1 tab(s) by mouth once a day (before a meal)  -- Indication: For Gerd    levothyroxine 112 mcg (0.112 mg) oral tablet  -- 1 tab(s) by mouth once a day  -- Indication: For Hypothyroidism    hydrALAZINE 10 mg oral tablet  -- 1 tab(s) by mouth 3 times a day  -- Indication: For Htn I will START or STAY ON the medications listed below when I get home from the hospital:    prednisoLONE  -- 1 drop(s) in the left eye 3 times a day  -- Indication: For left eye     aspirin 81 mg oral tablet  -- 1 tab(s) by mouth once a day  -- Indication: For Cad    OxyCONTIN  -- 5 milligram(s) by mouth once a day, As Needed  -- Indication: For urinary incontinance    enalapril 2.5 mg oral tablet  -- 1 tab(s) by mouth once a day  -- Indication: For CHF (congestive heart failure)    tamsulosin 0.4 mg oral capsule  -- 1 cap(s) by mouth 2 times a day  -- Indication: For urinary incontinance    isosorbide mononitrate 60 mg oral tablet, extended release  -- 1 tab(s) by mouth once a day  -- Indication: For Htn    nitroglycerin 0.4 mg sublingual tablet  -- 1 tab(s) under tongue every 5 minutes up to 3 doses daily MDD:3 tabs  -- Indication: For Cad    ranolazine 500 mg oral tablet, extended release  -- 1 tab(s) by mouth 2 times a day  -- Indication: For Cad    pregabalin 300 mg oral capsule  -- 1 cap(s) by mouth once a day (at bedtime)  -- Indication: For neuropathy    pregabalin 150 mg oral capsule  -- 1 cap(s) by mouth once a day  -- Indication: For neuropathy    Tresiba FlexTouch  -- 75 unit(s) subcutaneous once a day  -- Indication: For Dm    cetirizine 10 mg oral tablet  -- 1 tab(s) by mouth once a day  -- Indication: For Allergy    atorvastatin 80 mg oral tablet  -- 1 tab(s) by mouth once a day (at bedtime)  -- Indication: For Cad    carvedilol 6.25 mg oral tablet  -- 1 tab(s) by mouth every 12 hours  -- Indication: For Htn    albuterol 90 mcg/inh inhalation aerosol  -- 2 puff(s) inhaled every 6 hours, As Needed  -- Indication: For sob    Ventolin HFA 90 mcg/inh inhalation aerosol  -- PRN for asthma  -- Indication: For Asthma    Proventil HFA 90 mcg/inh inhalation aerosol  --  inhaled , As Needed  -- Indication: For Asthma    clotrimazole topical  -- Apply on skin to affected area , As Needed  -- Indication: For skin treatment    Lasix 40 mg oral tablet  -- 1 tab(s) by mouth 2 times a day  -- Indication: For Chf    ferrous sulfate 325 mg (65 mg elemental iron) oral tablet  -- 1  by mouth once a day  -- Indication: For Anemia    ocular lubricant ophthalmic solution  -- 1 drop(s) to each affected eye once a day  -- Indication: For Eyes    pantoprazole 40 mg oral delayed release tablet  -- 1 tab(s) by mouth once a day (before a meal)  -- Indication: For Gerd    levothyroxine 112 mcg (0.112 mg) oral tablet  -- 1 tab(s) by mouth once a day  -- Indication: For Hypothyroidism    hydrALAZINE 10 mg oral tablet  -- 1 tab(s) by mouth 3 times a day  -- Indication: For Htn

## 2017-11-15 NOTE — DISCHARGE NOTE ADULT - HOSPITAL COURSE
chief complaint of SOB (13 Nov 2017 02:57)    61 y/o female with hx of chronic systolic CHF with EF of 35 % due to ICM s/p LHC in 7/17 with multiple high grade calcified plaques not amenable to intervention or CT sx as per Cardiology reporting, Obesity, hypothyroid, ANIKA on nocturnal Cpap of 12, HTN, HLD, GERD, DM-2, psoriasis, Asthma, Peripheral neuropathy due to DM, presents to ED with increasing SOB, increasing TALBERT to the point where she has to place chairs every 6 feet in her house because she can not walk more than 6 feet without being winded and fatigued. She describes the SOB as pressure like and air hunger. She notes she started feeling ill about 1 week ago with URI symptoms and then was having some green sputum. No pleurisy or fevers. She has had no sick contacts, or travel. She ambulates with cane at home and has a wheel chair for when she goes out. She states she sought a second opinion about her CAD with Dr. Hayes at [place] and as of now has an appt. for cardiac cath and atherectomy on 11/29. In the ED patient with moderate resp. distress with increased WOB needing Bipap. ABG with hypercapnic resp. failure. Pt was seen by cardio and found to be in acute chf. Pt started on 40mg iv lasix bid. Pt improved clinically. Repeat echo was done which showed decrease in echo to 25-30 %. Pt started on enalapril 2.5mg. Pt to follow up at [facility] for Catherization on 11/29. Pt informed to return if symptoms return.

## 2017-11-15 NOTE — DISCHARGE NOTE ADULT - CARE PLAN
Principal Discharge DX:	Acute congestive heart failure, unspecified congestive heart failure type  Goal:	symptom control  Instructions for follow-up, activity and diet:	follow up with cardiologist within 1 week  cw medications  started on enalapril  Secondary Diagnosis:	Diabetes mellitus  Instructions for follow-up, activity and diet:	cw home medications  Secondary Diagnosis:	Asthma  Instructions for follow-up, activity and diet:	cw home medications  Secondary Diagnosis:	Coronary artery disease of native artery of native heart with stable angina pectoris  Goal:	pt to follow up with cardiology on outpatient. pt has cardiac cath scheduled on 11/29 with Dr Hayes.  Secondary Diagnosis:	Hypertension  Instructions for follow-up, activity and diet:	cw home medications

## 2017-11-15 NOTE — DISCHARGE NOTE ADULT - SECONDARY DIAGNOSIS.
Diabetes mellitus Asthma Coronary artery disease of native artery of native heart with stable angina pectoris Hypertension

## 2017-11-15 NOTE — PROGRESS NOTE ADULT - ASSESSMENT
61 y/o female with PMH of  CAD, ANIKA, HTN, HLD, GERD, Hypothyroid, peripheral neuropathy presented with acute on chronic systolic CHF exacerbation, Viral bronchitis, and Acute hypercapnic resp. failure.     -SOB 2/2 Acute on chronic systolic congestive heart failure.    secondary to acute on chronic systolic CHF.   on room air. changed to lasic 40mg iv daily    -Acute respiratory failure with hypercapnia.   Plan: Plan as above.     -Coronary artery disease of native artery of native heart with stable angina pectoris.  cw home medications   F/U with Dr. Hayes on 11/29 for intervention as stated above.     - Type 2 diabetes mellitus with other circulatory complication, with long-term current use of insulin.    Plan: Lantus, Humalog sliding scale with meals, Coverage insulin. a1c 6.7     - Hyperlipidemia, unspecified hyperlipidemia type.   Plan: statin, cardiac diet.     - Essential hypertension.  cw coreg and enalapril.    - Diabetic polyneuropathy associated with type 2 diabetes mellitus.   Plan: Cont. Lyrica, fall risk protocol.     -Psoriatic arthritis.   Plan: cont. o/p regimen.     -Asthma, unspecified asthma severity, unspecified whether complicated, unspecified whether persistent.   Plan: Duonebs prn.    - Gastroesophageal reflux disease, esophagitis presence not specified.   Plan; PPI.    -DVT ppx:  cw lovenox    DC planning in am. Pt wants to wait for ECHO prior to discharge.

## 2017-11-15 NOTE — DISCHARGE NOTE ADULT - PATIENT PORTAL LINK FT
“You can access the Follow[facility] Patient Portal, offered by [facility], by registering with the following website: [email or web address]”

## 2017-11-15 NOTE — PROGRESS NOTE ADULT - SUBJECTIVE AND OBJECTIVE BOX
chief complaint of SOB (2017 02:57)    61 y/o female with hx of chronic systolic CHF with EF of 35 % due to ICM s/p LHC in  with multiple high grade calcified plaques not amenable to intervention or CT sx as per Cardiology reporting, Obesity, hypothyroid, ANIKA on nocturnal Cpap of 12, HTN, HLD, GERD, DM-2, psoriasis, Asthma, Peripheral neuropathy due to DM, presents to ED with increasing SOB, increasing TALBERT to the point where she has to place chairs every 6 feet in her house because she can not walk more than 6 feet without being winded and fatigued. She describes the SOB as pressure like and air hunger. She notes she started feeling ill about 1 week ago with URI symptoms and then was having some green sputum. No pleurisy or fevers. She has had no sick contacts, or travel. She ambulates with cane at home and has a wheel chair for when she goes out. She states she sought a second opinion about her CAD with Dr. Hayes at [place] and as of now has an appt. for cardiac cath and atherectomy on . In the ED patient with moderate resp. distress with increased WOB needing Bipap. ABG with hypercapnic resp. failure. Patient also reports taking 4 days of old supply of cephalexin without any improvement in symptoms. (2017 02:57)    Pt notes improved sob. Off oxygen. Had low bp with lightheadedness this am.     ROS:  no fevers  no chills  no chest pain  no cough    PAST MEDICAL & SURGICAL HISTORY:  Tuberculosis: Treated at the age of 3 years  Psoriatic arthritis  Diabetic neuropathy  Blind: Left Eye  ANIKA treated with BiPAP  Acute promyelocytic leukemia  Osteoarthritis  Hypothyroidism  Hypertension  Hyperlipidemia  Asthma  Diabetes mellitus  S/P  section: x 6  S/P carpal tunnel release  S/P cervical spinal fusion  S/P cholecystectomy  H/O abdominal hysterectomy: secondry to Fibroids    MEDICATIONS  (STANDING):  artificial  tears Solution 1 Drop(s) Both EYES daily  aspirin enteric coated 81 milliGRAM(s) Oral daily  atorvastatin 80 milliGRAM(s) Oral at bedtime  carvedilol 9.375 milliGRAM(s) Oral every 12 hours  dextrose 5%. 1000 milliLiter(s) (50 mL/Hr) IV Continuous <Continuous>  dextrose 50% Injectable 12.5 Gram(s) IV Push once  docusate sodium 100 milliGRAM(s) Oral three times a day  enalapril 2.5 milliGRAM(s) Oral daily  furosemide   Injectable 40 milliGRAM(s) IV Push daily  hydrALAZINE 10 milliGRAM(s) Oral three times a day  insulin glargine Injectable (LANTUS) 70 Unit(s) SubCutaneous at bedtime  insulin lispro (HumaLOG) corrective regimen sliding scale   SubCutaneous Before meals and at bedtime  isosorbide   mononitrate ER Tablet (IMDUR) 60 milliGRAM(s) Oral daily  levothyroxine 112 MICROGram(s) Oral daily  loratadine 10 milliGRAM(s) Oral daily  pantoprazole    Tablet 40 milliGRAM(s) Oral before breakfast  prednisoLONE sodium phosphate 1% Solution 1 Drop(s) Left EYE three times a day  pregabalin 150 milliGRAM(s) Oral daily  pregabalin 300 milliGRAM(s) Oral at bedtime  ranolazine 1000 milliGRAM(s) Oral two times a day  sodium chloride 0.9% lock flush 3 milliLiter(s) IV Push every 8 hours  tamsulosin 0.4 milliGRAM(s) Oral two times a day    MEDICATIONS  (PRN):  ALBUTerol/ipratropium for Nebulization 3 milliLiter(s) Nebulizer every 6 hours PRN Shortness of Breath and/or Wheezing  aluminum hydroxide/magnesium hydroxide/simethicone Suspension 30 milliLiter(s) Oral every 6 hours PRN Dyspepsia  benzocaine 15 mG/menthol 3.6 mG Lozenge 1 Lozenge Oral every 4 hours PRN Sore Throat  dextrose Gel 1 Dose(s) Oral once PRN Blood Glucose LESS THAN 70 milliGRAM(s)/deciliter  diphenhydrAMINE   Capsule 50 milliGRAM(s) Oral every 6 hours PRN Rash and/or Itching  glucagon  Injectable 1 milliGRAM(s) IntraMuscular once PRN Glucose LESS THAN 70 milligrams/deciliter  guaiFENesin    Syrup 200 milliGRAM(s) Oral every 6 hours PRN Cough  ondansetron Injectable 4 milliGRAM(s) IV Push every 6 hours PRN Nausea    EXAM:  Vital Signs Last 24 Hrs  T(C): 36.7 (15 Nov 2017 10:35), Max: 36.9 (2017 22:44)  T(F): 98 (15 Nov 2017 10:35), Max: 98.4 (2017 22:44)  HR: 82 (15 Nov 2017 10:35) (70 - 84)  BP: 110/68 (15 Nov 2017 11:20) (92/40 - 122/60)  BP(mean): --  RR: 18 (15 Nov 2017 10:35) (18 - 20)  SpO2: 100% (15 Nov 2017 05:23) (96% - 100%)    GENERAL NAD, SITTING IN THE CHAIR  HEENT: NORMAL CEPHALIC ATRAUMATIC  NECK SUPPLE  CVS S1S2, RRR,   RESP BLCTA, NO RHONCHI  ABD SOFT, NON TENDER, NON DISTENDED  EXT NO EDEMA  NEURO MOVES ALL 4 EXTREMITES  PSYCH APPROPRIATE MOOD  SKIN WARM, DRY    LABS:                   8.8    5.9   )-----------( 155      ( 15 Nov 2017 06:03 )             28.4   11-15  144  |  105  |  42.0<H>  ----------------------------<  167<H>  4.0   |  28.0  |  1.29  Ca    8.7      15 Nov 2017 06:03  Phos  3.9     11-14  Mg     1.9     11-14      CARDIAC MARKERS ( 2017 18:03 )  x     / 0.04 ng/mL / x     / x     / x

## 2017-11-15 NOTE — DISCHARGE NOTE ADULT - OTHER SIGNIFICANT FINDINGS
ECHO:  < from: TTE Echo Complete w/Doppler (11.15.17 @ 14:52) >  Summary:   1. Technically fair study.   2. Endocardial visualization was enhanced with intravenous echo contrast.   3. Severely decreased global left ventricular systolic function.   4. Left ventricular ejection fraction, by visual estimation, is 25 to   30%.   5. Entire inferior wall, apical septal segment, basal inferolateral   segment, and apex are abnormal as described above.   6. Elevated left atrial and left ventricular end-diastolic pressures.   7. Spectral Doppler shows pseudonormal pattern of left ventricular   myocardial filling (Grade II diastolic dysfunction).   8. Mildly enlarged left atrium.   9. Thickening of the anterior and posterior mitral valve leaflets.  10. Trace mitral valve regurgitation.  11. There is no evidence of pericardial effusion.  12. Compared to a study from 2/17, LVEF is slightly lower (35-40% to   25-30%).     MD Zohra Electronically signed on 11/15/2017 at 4:12:55 PM       < end of copied text >

## 2017-11-24 ENCOUNTER — INPATIENT (INPATIENT)
Facility: HOSPITAL | Age: 62
LOS: 3 days | Discharge: ANOTHER HOSPITAL | DRG: 302 | End: 2017-11-28
Attending: INTERNAL MEDICINE | Admitting: INTERNAL MEDICINE
Payer: MEDICAID

## 2017-11-24 VITALS
HEART RATE: 103 BPM | RESPIRATION RATE: 20 BRPM | HEIGHT: 61 IN | OXYGEN SATURATION: 96 % | DIASTOLIC BLOOD PRESSURE: 96 MMHG | TEMPERATURE: 98 F | SYSTOLIC BLOOD PRESSURE: 168 MMHG | WEIGHT: 270.07 LBS

## 2017-11-24 DIAGNOSIS — I25.110 ATHEROSCLEROTIC HEART DISEASE OF NATIVE CORONARY ARTERY WITH UNSTABLE ANGINA PECTORIS: ICD-10-CM

## 2017-11-24 DIAGNOSIS — Z98.89 OTHER SPECIFIED POSTPROCEDURAL STATES: Chronic | ICD-10-CM

## 2017-11-24 DIAGNOSIS — I10 ESSENTIAL (PRIMARY) HYPERTENSION: ICD-10-CM

## 2017-11-24 DIAGNOSIS — I50.9 HEART FAILURE, UNSPECIFIED: ICD-10-CM

## 2017-11-24 DIAGNOSIS — E03.9 HYPOTHYROIDISM, UNSPECIFIED: ICD-10-CM

## 2017-11-24 DIAGNOSIS — J45.909 UNSPECIFIED ASTHMA, UNCOMPLICATED: ICD-10-CM

## 2017-11-24 DIAGNOSIS — I21.3 ST ELEVATION (STEMI) MYOCARDIAL INFARCTION OF UNSPECIFIED SITE: ICD-10-CM

## 2017-11-24 DIAGNOSIS — Z90.710 ACQUIRED ABSENCE OF BOTH CERVIX AND UTERUS: Chronic | ICD-10-CM

## 2017-11-24 DIAGNOSIS — E78.00 PURE HYPERCHOLESTEROLEMIA, UNSPECIFIED: ICD-10-CM

## 2017-11-24 DIAGNOSIS — Z90.49 ACQUIRED ABSENCE OF OTHER SPECIFIED PARTS OF DIGESTIVE TRACT: Chronic | ICD-10-CM

## 2017-11-24 DIAGNOSIS — E11.9 TYPE 2 DIABETES MELLITUS WITHOUT COMPLICATIONS: ICD-10-CM

## 2017-11-24 DIAGNOSIS — Z98.1 ARTHRODESIS STATUS: Chronic | ICD-10-CM

## 2017-11-24 LAB
ALBUMIN SERPL ELPH-MCNC: 3.9 G/DL — SIGNIFICANT CHANGE UP (ref 3.3–5.2)
ALP SERPL-CCNC: 169 U/L — HIGH (ref 40–120)
ALT FLD-CCNC: 21 U/L — SIGNIFICANT CHANGE UP
ANION GAP SERPL CALC-SCNC: 14 MMOL/L — SIGNIFICANT CHANGE UP (ref 5–17)
ANISOCYTOSIS BLD QL: SLIGHT — SIGNIFICANT CHANGE UP
APTT BLD: 33 SEC — SIGNIFICANT CHANGE UP (ref 27.5–37.4)
APTT BLD: 45.8 SEC — HIGH (ref 27.5–37.4)
APTT BLD: 87 SEC — HIGH (ref 27.5–37.4)
AST SERPL-CCNC: 20 U/L — SIGNIFICANT CHANGE UP
BILIRUB SERPL-MCNC: 0.2 MG/DL — LOW (ref 0.4–2)
BUN SERPL-MCNC: 41 MG/DL — HIGH (ref 8–20)
CALCIUM SERPL-MCNC: 9.2 MG/DL — SIGNIFICANT CHANGE UP (ref 8.6–10.2)
CHLORIDE SERPL-SCNC: 103 MMOL/L — SIGNIFICANT CHANGE UP (ref 98–107)
CHOLEST SERPL-MCNC: 130 MG/DL — SIGNIFICANT CHANGE UP (ref 110–199)
CK SERPL-CCNC: 112 U/L — SIGNIFICANT CHANGE UP (ref 25–170)
CO2 SERPL-SCNC: 24 MMOL/L — SIGNIFICANT CHANGE UP (ref 22–29)
CREAT SERPL-MCNC: 1.54 MG/DL — HIGH (ref 0.5–1.3)
EOSINOPHIL NFR BLD AUTO: 3 % — SIGNIFICANT CHANGE UP (ref 0–5)
GLUCOSE BLDC GLUCOMTR-MCNC: 163 MG/DL — HIGH (ref 70–99)
GLUCOSE BLDC GLUCOMTR-MCNC: 163 MG/DL — HIGH (ref 70–99)
GLUCOSE BLDC GLUCOMTR-MCNC: 281 MG/DL — HIGH (ref 70–99)
GLUCOSE BLDC GLUCOMTR-MCNC: 338 MG/DL — HIGH (ref 70–99)
GLUCOSE SERPL-MCNC: 234 MG/DL — HIGH (ref 70–115)
HBA1C BLD-MCNC: 7 % — HIGH (ref 4–5.6)
HCT VFR BLD CALC: 32.5 % — LOW (ref 37–47)
HDLC SERPL-MCNC: 39 MG/DL — LOW
HGB BLD-MCNC: 10.2 G/DL — LOW (ref 12–16)
INR BLD: 1.01 RATIO — SIGNIFICANT CHANGE UP (ref 0.88–1.16)
LIPID PNL WITH DIRECT LDL SERPL: 28 MG/DL — SIGNIFICANT CHANGE UP
LYMPHOCYTES # BLD AUTO: 20 % — SIGNIFICANT CHANGE UP (ref 20–55)
MCHC RBC-ENTMCNC: 30.5 PG — SIGNIFICANT CHANGE UP (ref 27–31)
MCHC RBC-ENTMCNC: 31.4 G/DL — LOW (ref 32–36)
MCV RBC AUTO: 97.3 FL — SIGNIFICANT CHANGE UP (ref 81–99)
MICROCYTES BLD QL: SLIGHT — SIGNIFICANT CHANGE UP
MONOCYTES NFR BLD AUTO: 6 % — SIGNIFICANT CHANGE UP (ref 3–10)
NEUTROPHILS NFR BLD AUTO: 71 % — SIGNIFICANT CHANGE UP (ref 37–73)
NT-PROBNP SERPL-SCNC: 7704 PG/ML — HIGH (ref 0–300)
OVALOCYTES BLD QL SMEAR: SLIGHT — SIGNIFICANT CHANGE UP
PLAT MORPH BLD: NORMAL — SIGNIFICANT CHANGE UP
PLATELET # BLD AUTO: 161 K/UL — SIGNIFICANT CHANGE UP (ref 150–400)
POIKILOCYTOSIS BLD QL AUTO: SLIGHT — SIGNIFICANT CHANGE UP
POTASSIUM SERPL-MCNC: 4.1 MMOL/L — SIGNIFICANT CHANGE UP (ref 3.5–5.3)
POTASSIUM SERPL-SCNC: 4.1 MMOL/L — SIGNIFICANT CHANGE UP (ref 3.5–5.3)
PROT SERPL-MCNC: 7.5 G/DL — SIGNIFICANT CHANGE UP (ref 6.6–8.7)
PROTHROM AB SERPL-ACNC: 11.1 SEC — SIGNIFICANT CHANGE UP (ref 9.8–12.7)
RBC # BLD: 3.34 M/UL — LOW (ref 4.4–5.2)
RBC # FLD: 13.3 % — SIGNIFICANT CHANGE UP (ref 11–15.6)
RBC BLD AUTO: ABNORMAL
SODIUM SERPL-SCNC: 141 MMOL/L — SIGNIFICANT CHANGE UP (ref 135–145)
TOTAL CHOLESTEROL/HDL RATIO MEASUREMENT: 3 RATIO — LOW (ref 3.3–7.1)
TRIGL SERPL-MCNC: 314 MG/DL — HIGH (ref 10–200)
TROPONIN T SERPL-MCNC: 0.09 NG/ML — HIGH (ref 0–0.06)
TROPONIN T SERPL-MCNC: 0.13 NG/ML — HIGH (ref 0–0.06)
TROPONIN T SERPL-MCNC: <0.01 NG/ML — SIGNIFICANT CHANGE UP (ref 0–0.06)
WBC # BLD: 6.4 K/UL — SIGNIFICANT CHANGE UP (ref 4.8–10.8)
WBC # FLD AUTO: 6.4 K/UL — SIGNIFICANT CHANGE UP (ref 4.8–10.8)

## 2017-11-24 PROCEDURE — 93010 ELECTROCARDIOGRAM REPORT: CPT

## 2017-11-24 PROCEDURE — 71010: CPT | Mod: 26

## 2017-11-24 PROCEDURE — 99291 CRITICAL CARE FIRST HOUR: CPT

## 2017-11-24 PROCEDURE — 99233 SBSQ HOSP IP/OBS HIGH 50: CPT

## 2017-11-24 PROCEDURE — 99223 1ST HOSP IP/OBS HIGH 75: CPT

## 2017-11-24 RX ORDER — LEVOTHYROXINE SODIUM 125 MCG
112 TABLET ORAL DAILY
Qty: 0 | Refills: 0 | Status: DISCONTINUED | OUTPATIENT
Start: 2017-11-24 | End: 2017-11-28

## 2017-11-24 RX ORDER — INSULIN GLARGINE 100 [IU]/ML
50 INJECTION, SOLUTION SUBCUTANEOUS EVERY MORNING
Qty: 0 | Refills: 0 | Status: DISCONTINUED | OUTPATIENT
Start: 2017-11-24 | End: 2017-11-28

## 2017-11-24 RX ORDER — HYDRALAZINE HCL 50 MG
10 TABLET ORAL EVERY 8 HOURS
Qty: 0 | Refills: 0 | Status: DISCONTINUED | OUTPATIENT
Start: 2017-11-24 | End: 2017-11-25

## 2017-11-24 RX ORDER — HEPARIN SODIUM 5000 [USP'U]/ML
INJECTION INTRAVENOUS; SUBCUTANEOUS
Qty: 25000 | Refills: 0 | Status: DISCONTINUED | OUTPATIENT
Start: 2017-11-24 | End: 2017-11-25

## 2017-11-24 RX ORDER — HYDROCORTISONE 20 MG
50 TABLET ORAL EVERY 8 HOURS
Qty: 0 | Refills: 0 | Status: DISCONTINUED | OUTPATIENT
Start: 2017-11-24 | End: 2017-11-24

## 2017-11-24 RX ORDER — ASPIRIN/CALCIUM CARB/MAGNESIUM 324 MG
81 TABLET ORAL DAILY
Qty: 0 | Refills: 0 | Status: DISCONTINUED | OUTPATIENT
Start: 2017-11-24 | End: 2017-11-28

## 2017-11-24 RX ORDER — TAMSULOSIN HYDROCHLORIDE 0.4 MG/1
0.4 CAPSULE ORAL AT BEDTIME
Qty: 0 | Refills: 0 | Status: DISCONTINUED | OUTPATIENT
Start: 2017-11-24 | End: 2017-11-28

## 2017-11-24 RX ORDER — NITROGLYCERIN 6.5 MG
2 CAPSULE, EXTENDED RELEASE ORAL EVERY 6 HOURS
Qty: 0 | Refills: 0 | Status: DISCONTINUED | OUTPATIENT
Start: 2017-11-24 | End: 2017-11-26

## 2017-11-24 RX ORDER — CARVEDILOL PHOSPHATE 80 MG/1
6.25 CAPSULE, EXTENDED RELEASE ORAL EVERY 12 HOURS
Qty: 0 | Refills: 0 | Status: DISCONTINUED | OUTPATIENT
Start: 2017-11-24 | End: 2017-11-28

## 2017-11-24 RX ORDER — NITROGLYCERIN 6.5 MG
2 CAPSULE, EXTENDED RELEASE ORAL ONCE
Qty: 0 | Refills: 0 | Status: COMPLETED | OUTPATIENT
Start: 2017-11-24 | End: 2017-11-24

## 2017-11-24 RX ORDER — HEPARIN SODIUM 5000 [USP'U]/ML
5000 INJECTION INTRAVENOUS; SUBCUTANEOUS ONCE
Qty: 0 | Refills: 0 | Status: COMPLETED | OUTPATIENT
Start: 2017-11-24 | End: 2017-11-24

## 2017-11-24 RX ORDER — ALBUTEROL 90 UG/1
2 AEROSOL, METERED ORAL EVERY 6 HOURS
Qty: 0 | Refills: 0 | Status: DISCONTINUED | OUTPATIENT
Start: 2017-11-24 | End: 2017-11-28

## 2017-11-24 RX ORDER — RANOLAZINE 500 MG/1
500 TABLET, FILM COATED, EXTENDED RELEASE ORAL
Qty: 0 | Refills: 0 | Status: DISCONTINUED | OUTPATIENT
Start: 2017-11-24 | End: 2017-11-26

## 2017-11-24 RX ORDER — CLOPIDOGREL BISULFATE 75 MG/1
75 TABLET, FILM COATED ORAL DAILY
Qty: 0 | Refills: 0 | Status: DISCONTINUED | OUTPATIENT
Start: 2017-11-24 | End: 2017-11-28

## 2017-11-24 RX ORDER — INSULIN GLARGINE 100 [IU]/ML
75 INJECTION, SOLUTION SUBCUTANEOUS EVERY MORNING
Qty: 0 | Refills: 0 | Status: DISCONTINUED | OUTPATIENT
Start: 2017-11-24 | End: 2017-11-24

## 2017-11-24 RX ORDER — BRIMONIDINE TARTRATE 2 MG/MG
1 SOLUTION/ DROPS OPHTHALMIC THREE TIMES A DAY
Qty: 0 | Refills: 0 | Status: DISCONTINUED | OUTPATIENT
Start: 2017-11-24 | End: 2017-11-25

## 2017-11-24 RX ORDER — INSULIN LISPRO 100/ML
VIAL (ML) SUBCUTANEOUS
Qty: 0 | Refills: 0 | Status: DISCONTINUED | OUTPATIENT
Start: 2017-11-24 | End: 2017-11-28

## 2017-11-24 RX ORDER — ATORVASTATIN CALCIUM 80 MG/1
80 TABLET, FILM COATED ORAL AT BEDTIME
Qty: 0 | Refills: 0 | Status: DISCONTINUED | OUTPATIENT
Start: 2017-11-24 | End: 2017-11-28

## 2017-11-24 RX ORDER — HEPARIN SODIUM 5000 [USP'U]/ML
4000 INJECTION INTRAVENOUS; SUBCUTANEOUS EVERY 6 HOURS
Qty: 0 | Refills: 0 | Status: DISCONTINUED | OUTPATIENT
Start: 2017-11-24 | End: 2017-11-25

## 2017-11-24 RX ORDER — ISOSORBIDE MONONITRATE 60 MG/1
60 TABLET, EXTENDED RELEASE ORAL DAILY
Qty: 0 | Refills: 0 | Status: DISCONTINUED | OUTPATIENT
Start: 2017-11-24 | End: 2017-11-26

## 2017-11-24 RX ORDER — PREDNISOLONE SODIUM PHOSPHATE 1 %
1 DROPS OPHTHALMIC (EYE)
Qty: 0 | Refills: 0 | Status: DISCONTINUED | OUTPATIENT
Start: 2017-11-24 | End: 2017-11-28

## 2017-11-24 RX ORDER — CLOPIDOGREL BISULFATE 75 MG/1
600 TABLET, FILM COATED ORAL ONCE
Qty: 0 | Refills: 0 | Status: COMPLETED | OUTPATIENT
Start: 2017-11-24 | End: 2017-11-24

## 2017-11-24 RX ORDER — FERROUS SULFATE 325(65) MG
325 TABLET ORAL DAILY
Qty: 0 | Refills: 0 | Status: DISCONTINUED | OUTPATIENT
Start: 2017-11-24 | End: 2017-11-28

## 2017-11-24 RX ORDER — FUROSEMIDE 40 MG
40 TABLET ORAL EVERY 12 HOURS
Qty: 0 | Refills: 0 | Status: DISCONTINUED | OUTPATIENT
Start: 2017-11-24 | End: 2017-11-27

## 2017-11-24 RX ORDER — THIAMINE MONONITRATE (VIT B1) 100 MG
200 TABLET ORAL EVERY 12 HOURS
Qty: 0 | Refills: 0 | Status: DISCONTINUED | OUTPATIENT
Start: 2017-11-24 | End: 2017-11-24

## 2017-11-24 RX ADMIN — Medication 100 MILLIGRAM(S): at 12:14

## 2017-11-24 RX ADMIN — ISOSORBIDE MONONITRATE 60 MILLIGRAM(S): 60 TABLET, EXTENDED RELEASE ORAL at 12:01

## 2017-11-24 RX ADMIN — HEPARIN SODIUM 0 UNIT(S)/HR: 5000 INJECTION INTRAVENOUS; SUBCUTANEOUS at 16:16

## 2017-11-24 RX ADMIN — ATORVASTATIN CALCIUM 80 MILLIGRAM(S): 80 TABLET, FILM COATED ORAL at 22:10

## 2017-11-24 RX ADMIN — CLOPIDOGREL BISULFATE 600 MILLIGRAM(S): 75 TABLET, FILM COATED ORAL at 01:45

## 2017-11-24 RX ADMIN — RANOLAZINE 500 MILLIGRAM(S): 500 TABLET, FILM COATED, EXTENDED RELEASE ORAL at 18:21

## 2017-11-24 RX ADMIN — CARVEDILOL PHOSPHATE 6.25 MILLIGRAM(S): 80 CAPSULE, EXTENDED RELEASE ORAL at 18:21

## 2017-11-24 RX ADMIN — Medication 40 MILLIGRAM(S): at 06:52

## 2017-11-24 RX ADMIN — Medication 150 MILLIGRAM(S): at 12:00

## 2017-11-24 RX ADMIN — Medication 325 MILLIGRAM(S): at 12:01

## 2017-11-24 RX ADMIN — Medication 2 INCH(S): at 08:52

## 2017-11-24 RX ADMIN — Medication 2.5 MILLIGRAM(S): at 06:53

## 2017-11-24 RX ADMIN — Medication 10 MILLIGRAM(S): at 06:54

## 2017-11-24 RX ADMIN — Medication 100 MILLIGRAM(S): at 23:33

## 2017-11-24 RX ADMIN — RANOLAZINE 500 MILLIGRAM(S): 500 TABLET, FILM COATED, EXTENDED RELEASE ORAL at 06:52

## 2017-11-24 RX ADMIN — HEPARIN SODIUM 200 UNIT(S)/HR: 5000 INJECTION INTRAVENOUS; SUBCUTANEOUS at 18:55

## 2017-11-24 RX ADMIN — Medication 2: at 16:24

## 2017-11-24 RX ADMIN — Medication 40 MILLIGRAM(S): at 18:21

## 2017-11-24 RX ADMIN — Medication 1 DROP(S): at 15:05

## 2017-11-24 RX ADMIN — Medication 2 INCH(S): at 01:34

## 2017-11-24 RX ADMIN — INSULIN GLARGINE 50 UNIT(S): 100 INJECTION, SOLUTION SUBCUTANEOUS at 15:05

## 2017-11-24 RX ADMIN — CLOPIDOGREL BISULFATE 75 MILLIGRAM(S): 75 TABLET, FILM COATED ORAL at 12:02

## 2017-11-24 RX ADMIN — Medication 6: at 12:07

## 2017-11-24 RX ADMIN — Medication 112 MICROGRAM(S): at 06:52

## 2017-11-24 RX ADMIN — TAMSULOSIN HYDROCHLORIDE 0.4 MILLIGRAM(S): 0.4 CAPSULE ORAL at 22:11

## 2017-11-24 RX ADMIN — Medication 300 MILLIGRAM(S): at 22:10

## 2017-11-24 RX ADMIN — Medication 10 MILLIGRAM(S): at 22:11

## 2017-11-24 RX ADMIN — HEPARIN SODIUM 1000 UNIT(S)/HR: 5000 INJECTION INTRAVENOUS; SUBCUTANEOUS at 01:46

## 2017-11-24 RX ADMIN — Medication 8: at 08:52

## 2017-11-24 RX ADMIN — BRIMONIDINE TARTRATE 1 DROP(S): 2 SOLUTION/ DROPS OPHTHALMIC at 22:11

## 2017-11-24 RX ADMIN — Medication 1 DROP(S): at 22:27

## 2017-11-24 RX ADMIN — Medication 2 INCH(S): at 12:02

## 2017-11-24 RX ADMIN — Medication 81 MILLIGRAM(S): at 11:59

## 2017-11-24 RX ADMIN — HEPARIN SODIUM 5000 UNIT(S): 5000 INJECTION INTRAVENOUS; SUBCUTANEOUS at 01:30

## 2017-11-24 RX ADMIN — CARVEDILOL PHOSPHATE 6.25 MILLIGRAM(S): 80 CAPSULE, EXTENDED RELEASE ORAL at 06:53

## 2017-11-24 RX ADMIN — Medication 2 INCH(S): at 12:34

## 2017-11-24 NOTE — CONSULT NOTE ADULT - SUBJECTIVE AND OBJECTIVE BOX
GEMINI ROONEY  [id number]    CC:  Patient is a 62y old  Female who presents with a chief complaint of anginal pain x several hours      HPI:  61 y/o F with a h/o HTN, HLD, DM, CAD (recent cardiac cath in 2017 showing 95% stenosis of LAD and 100% stenosis of RCA/Circ, good collateral circulation, not a candidate for CABG), leukemia (in remission), systolic CHF (EF: 25-30%), asthma, hypothyroid, ANIKA, presents to ED with SOB and acute onset of substernal chest pain with radiation to back, left arm, and jaw- began as tightness and progressed to crushing in nature. Took total of 6 NTG with incomplete relief. EKG in ED showed ST-elevation in lead 3, with reciprocal ST-depressions in leads 1 and AVL, as per report, this is similar to prior EKG. Code STEMI called and interventional cardiology recommended aggressive medical management only. CP improved with nitro SL and paste. Trops neg x1.    Patient seen and examined in ED. Awake, alert, and conversant. Reports that CP is continuing to improve. Currently hemodynamically stable. Reports that she has an appointment  for high risk stenting with Dr. Bryson Hayes. (2017 02:57)      ALLERGIES:    Cipro (Unknown)  Demerol HCl (Hives)  iodine (Unknown)  iodine containing compounds (Anaphylaxis)  loperamide (Unknown)  phenylpiperidine derivatives (Unknown)  tramadol (Unknown)      PAST MEDICAL & SURGICAL HISTORY:  CHF (congestive heart failure)  Tuberculosis: Treated at the age of 3 years  Psoriatic arthritis  Diabetic neuropathy  Blind: Left Eye  ANIKA treated with BiPAP  Acute promyelocytic leukemia  Osteoarthritis  Hypothyroidism  Hypertension  Hyperlipidemia  Asthma  Diabetes mellitus  S/P  section: x 6  S/P carpal tunnel release  S/P cervical spinal fusion  S/P cholecystectomy  H/O abdominal hysterectomy: secondry to Fibroids      MEDICATIONS:  MEDICATIONS  (STANDING):  artificial  tears Solution 1 Drop(s) Both EYES daily  aspirin  chewable 81 milliGRAM(s) Oral daily  atorvastatin 80 milliGRAM(s) Oral at bedtime  carvedilol 6.25 milliGRAM(s) Oral every 12 hours  clopidogrel Tablet 75 milliGRAM(s) Oral daily  enalapril 2.5 milliGRAM(s) Oral daily  ferrous    sulfate 325 milliGRAM(s) Oral daily  furosemide    Tablet 40 milliGRAM(s) Oral every 12 hours  heparin  Infusion.  Unit(s)/Hr (10 mL/Hr) IV Continuous <Continuous>  hydrALAZINE 10 milliGRAM(s) Oral every 8 hours  insulin lispro (HumaLOG) corrective regimen sliding scale   SubCutaneous three times a day before meals  isosorbide   mononitrate ER Tablet (IMDUR) 60 milliGRAM(s) Oral daily  levothyroxine 112 MICROGram(s) Oral daily  nitroglycerin    2% Ointment 2 Inch(s) Transdermal every 6 hours  pregabalin 150 milliGRAM(s) Oral daily  pregabalin 300 milliGRAM(s) Oral at bedtime  ranolazine 500 milliGRAM(s) Oral two times a day  tamsulosin 0.4 milliGRAM(s) Oral at bedtime    MEDICATIONS  (PRN):  ALBUTerol    90 MICROgram(s) HFA Inhaler 2 Puff(s) Inhalation every 6 hours PRN Shortness of Breath and/or Wheezing  heparin  Injectable 4000 Unit(s) IV Push every 6 hours PRN For aPTT less than 40    ALBUTerol    90 MICROgram(s) HFA Inhaler 2 Puff(s) Inhalation every 6 hours PRN  artificial  tears Solution 1 Drop(s) Both EYES daily  aspirin  chewable 81 milliGRAM(s) Oral daily  atorvastatin 80 milliGRAM(s) Oral at bedtime  carvedilol 6.25 milliGRAM(s) Oral every 12 hours  clopidogrel Tablet 75 milliGRAM(s) Oral daily  enalapril 2.5 milliGRAM(s) Oral daily  ferrous    sulfate 325 milliGRAM(s) Oral daily  furosemide    Tablet 40 milliGRAM(s) Oral every 12 hours  heparin  Infusion.  Unit(s)/Hr IV Continuous <Continuous>  heparin  Injectable 4000 Unit(s) IV Push every 6 hours PRN  hydrALAZINE 10 milliGRAM(s) Oral every 8 hours  insulin lispro (HumaLOG) corrective regimen sliding scale   SubCutaneous three times a day before meals  isosorbide   mononitrate ER Tablet (IMDUR) 60 milliGRAM(s) Oral daily  levothyroxine 112 MICROGram(s) Oral daily  nitroglycerin    2% Ointment 2 Inch(s) Transdermal every 6 hours  pregabalin 150 milliGRAM(s) Oral daily  pregabalin 300 milliGRAM(s) Oral at bedtime  ranolazine 500 milliGRAM(s) Oral two times a day  tamsulosin 0.4 milliGRAM(s) Oral at bedtime      SOCIAL HISTORY:  Patient denies alcohol, tobacco, drug use    FAMILY HISTORY:  Family history of coronary artery disease (Grandparent)  Family history of blood disorder (Sibling)  Family history of cancer (Sibling): Sisters - Ovarian Caner and Lung Cancer  Brothers - Lung Cancer  Family history of diabetes mellitus (Sibling)      ROS:  Patient denies cough, and other than noted above full ROS is unremarkable      PHYSICAL EXAM:  Vital Signs Last 24 Hrs  T(C): 36.8 (2017 08:00), Max: 36.9 (2017 03:12)  T(F): 98.2 (2017 08:00), Max: 98.5 (2017 03:12)  HR: 87 (2017 08:00) (87 - 103)  BP: 180/71 (2017 08:00) (145/99 - 180/71)  BP(mean): 102 (2017 08:00) (102 - 102)  RR: 32 (2017 08:00) (18 - 32)  SpO2: 97% (2017 08:00) (96% - 99%)  General: Patient comfotable in NAD  HEENT: NCAT, mmm, EOMI  Neck: no JVD, no carotid bruits  CVS: distant nl s1, split s2, no s3, no s4, no murmur or rubs, RRR  Chest: CTA b/l  Abdomen: soft, morbidly obese  nt/nd  Extremities: 2+ edema  Neuro: A&O x3  Psych: Normal affect      ECG: NSR with o;d IWMI pattern    ECHO Reviewed:  Large Posterolateral MI with LVEF = 40%  LAD territory seems to contract well    LABS:                        10.2   6.4   )-----------( 161      ( 2017 02:00 )             32.5     11-24    141  |  103  |  41.0<H>  ----------------------------<  234<H>  4.1   |  24.0  |  1.54<H>    Ca    9.2      2017 02:00    TPro  7.5  /  Alb  3.9  /  TBili  0.2<L>  /  DBili  x   /  AST  20  /  ALT  21  /  AlkPhos  169<H>  11-24    CARDIAC MARKERS ( 2017 02:00 )  x     / <0.01 ng/mL / 112 U/L / x     / x          PT/INR - ( 2017 02:00 )   PT: 11.1 sec;   INR: 1.01 ratio         PTT - ( 2017 07:21 )  PTT:87.0 sec      Assessmwent:  63y/o Female with PMHx HTN, HLD, DM, CAD (recent cardiac cath in 2017 showing 95% stenosis of LAD and 100% stenosis of RCA/Circ, good collateral circulation, not a candidate for CABG), leukemia (in remission), systolic CHF (EF: 25-30%), asthma, hypothyroid, ANIKA, presents to ED with SOB and acute onset of substernal chest pain. Awaiting further Troponin and ECG to see if there has been an infarct.  The potential for revascularization of the complex LAD stenosis is limited.  She is scheduled for a high risk procedure at [place] on     Plan:  1. Troponins and ECG  2 Increase BB as tolerated.  3. Mobilize slowly over the weekend  4. If remains stable would like to see DC by Monday so she can get to [place] on Tues,

## 2017-11-24 NOTE — CONSULT NOTE ADULT - SUBJECTIVE AND OBJECTIVE BOX
61 yo F arrived by ambulance after she developed 9/10 severe sharp sub-sternal chest pain this evening. States that she was feeling well when suddenly became SOB at rest, took a few SL NTG one after the other, without any significant relief.  Also got ASA 81 x 4 tablets in the field. Patient developed concurrent diaphoresis as well during the chest pain episode along with left sided arm and jaw pain that was persistent until she arrived to [facility].  In the [facility] patient's ECG was c/w 2 mm ST elevation in leads III, aVF and her chest pain remained constant at 7/10, sharp.  Code STEMI called.  Patient received NTP, Plavix load, started on Heparin gtt and chest pain subsided to a 3/10 level.  Currently "feeling slightly better" and denies palpitations, N/V, diaphoresis, HA, abdominal pain or left jaw/arm pain.  Admits to chronic back pain at this time.    Allergy: Cipro, Demerol, Loperamide, Tramadol, Iodine    PMH:     PSH:             Vitals: BP: 168/96 mm Hg  HR: 99  RR: 20  T: 97.7  O2 Sat: 96% on 3L [facility] 63 yo F arrived by ambulance after she developed 9/10 severe sharp sub-sternal chest pain this evening. States that she was feeling well when suddenly became SOB at rest, took a few SL NTG one after the other, without any significant relief.  Also got ASA 81 x 4 tablets in the field. Patient developed concurrent diaphoresis as well during the chest pain episode along with left sided arm and jaw pain that was persistent until she arrived to [facility].  In the [facility] patient's ECG was c/w 2 mm ST elevation in leads III, aVF and her chest pain remained constant at 7/10, sharp.  Code STEMI called.  Patient received NTP, Plavix load, started on Heparin gtt and chest pain subsided to a 3/10 level.  Currently "feeling slightly better" and denies palpitations, N/V, diaphoresis, HA, abdominal pain or left jaw/arm pain.  Admits to chronic back pain at this time.    Allergy: Cipro, Demerol, Loperamide, Tramadol, Iodine    Home Medications:   Lasix 40 mg oral tablet: 1 tab(s) orally 2 times a day  Enalapril 2.5 mg oral tablet: 1 tab(s) orally once a day  PrednisoLONE: 1 drop(s) in the left eye 3 times a day  Isosorbide mononitrate 60 mg oral tablet, extended release: 1 tab(s) orally once a day  Tamsulosin 0.4 mg oral capsule: 1 cap(s) orally 2 times a day  HydrALAZINE 10 mg oral tablet: 1 tab(s) orally 3 times a day  Ocular lubricant ophthalmic solution: 1 drop(s) to each affected eye once a day  Pantoprazole 40 mg oral delayed release tablet: 1 tab(s) orally once a day (before a meal)  Levothyroxine 112 mcg (0.112 mg) oral tablet: 1 tab(s) orally once a day  Nitroglycerin 0.4 mg sublingual tablet: 1 tab(s) sublingual every 5 minutes up to 3 doses daily MDD:3 tabs  Ranolazine 500 mg oral tablet, extended release: 1 tab(s) orally 2 times a day  Albuterol 90 mcg/inh inhalation aerosol: 2 puff(s) inhaled every 6 hours, As Needed  Carvedilol 6.25 mg oral tablet: 1 tab(s) orally every 12 hours  Atorvastatin 80 mg oral tablet: 1 tab(s) orally once a day (at bedtime)  Aspirin 81 mg oral tablet: 1 tab(s) orally once a day  Pregabalin 300 mg oral capsule: 1 cap(s) orally once a day (at bedtime)  Pregabalin 150 mg oral capsule: 1 cap(s) orally once a day  Cetirizine 10 mg oral tablet: 1 tab(s) orally once a day  Clotrimazole topical: Apply topically to affected area , As Needed  Ferrous sulfate 325 mg (65 mg elemental iron) oral tablet: 1  orally once a day  Tresiba FlexTouch: 75 unit(s) subcutaneous once a day  Ventolin HFA 90 mcg/inh inhalation aerosol: PRN for asthma  Proventil HFA 90 mcg/inh inhalation aerosol:  inhaled , As    Past Medical History:  CAD with heavily calcified RCA, 100 percent blockage of L circumflex and 95% of LAD with good collateral flow and unable to be stented   Acute promyelocytic leukemia    Asthma    Blind  Left Eye  Diabetes mellitus T2   Diabetic neuropathy    Hyperlipidemia    Hypertension    Hypothyroidism    ANIKA treated with BiPAP    Osteoarthritis    Psoriatic arthritis    Tuberculosis  Treated at the age of 3 years.    Past Surgical History:  H/O abdominal hysterectomy  secondry to Fibroids  S/P carpal tunnel release    S/P cervical spinal fusion    S/P  section  x 6  S/P cholecystectomy                    Vitals: BP: 168/96 mm Hg  HR: 99  RR: 20  T: 97.7  O2 Sat: 96% on 3L [facility]

## 2017-11-24 NOTE — PROGRESS NOTE ADULT - SUBJECTIVE AND OBJECTIVE BOX
Patient is a 62y old  Female who presents with a chief complaint of STEMI (2017 02:57)      BRIEF HOSPITAL COURSE: ***    Events last 24 hours: ***    PAST MEDICAL & SURGICAL HISTORY:  CHF (congestive heart failure)  Tuberculosis: Treated at the age of 3 years  Psoriatic arthritis  Diabetic neuropathy  Blind: Left Eye  ANIKA treated with BiPAP  Acute promyelocytic leukemia  Osteoarthritis  Hypothyroidism  Hypertension  Hyperlipidemia  Asthma  Diabetes mellitus  S/P  section: x 6  S/P carpal tunnel release  S/P cervical spinal fusion  S/P cholecystectomy  H/O abdominal hysterectomy: secondry to Fibroids      Review of Systems:  CONSTITUTIONAL: No fever, chills, or fatigue  EYES: No eye pain, visual disturbances, or discharge  ENMT:  No difficulty hearing, tinnitus, vertigo; No sinus or throat pain  NECK: No pain or stiffness  RESPIRATORY: No cough, wheezing, chills or hemoptysis; No shortness of breath  CARDIOVASCULAR: No chest pain, palpitations, dizziness, or leg swelling  GASTROINTESTINAL: No abdominal or epigastric pain. No nausea, vomiting, or hematemesis; No diarrhea or constipation. No melena or hematochezia.  GENITOURINARY: No dysuria, frequency, hematuria, or incontinence  NEUROLOGICAL: No headaches, memory loss, loss of strength, numbness, or tremors  SKIN: No itching, burning, rashes, or lesions   MUSCULOSKELETAL: No joint pain or swelling; No muscle, back, or extremity pain  PSYCHIATRIC: No depression, anxiety, mood swings, or difficulty sleeping      Medications:    carvedilol 6.25 milliGRAM(s) Oral every 12 hours  enalapril 2.5 milliGRAM(s) Oral daily  furosemide    Tablet 40 milliGRAM(s) Oral every 12 hours  hydrALAZINE 10 milliGRAM(s) Oral every 8 hours  isosorbide   mononitrate ER Tablet (IMDUR) 60 milliGRAM(s) Oral daily  nitroglycerin    2% Ointment 2 Inch(s) Transdermal every 6 hours  ranolazine 500 milliGRAM(s) Oral two times a day  tamsulosin 0.4 milliGRAM(s) Oral at bedtime    ALBUTerol    90 MICROgram(s) HFA Inhaler 2 Puff(s) Inhalation every 6 hours PRN    pregabalin 150 milliGRAM(s) Oral daily  pregabalin 300 milliGRAM(s) Oral at bedtime      aspirin  chewable 81 milliGRAM(s) Oral daily  clopidogrel Tablet 75 milliGRAM(s) Oral daily  heparin  Infusion.  Unit(s)/Hr IV Continuous <Continuous>  heparin  Injectable 4000 Unit(s) IV Push every 6 hours PRN        atorvastatin 80 milliGRAM(s) Oral at bedtime  insulin lispro (HumaLOG) corrective regimen sliding scale   SubCutaneous three times a day before meals  levothyroxine 112 MICROGram(s) Oral daily    ferrous    sulfate 325 milliGRAM(s) Oral daily      artificial  tears Solution 1 Drop(s) Both EYES daily            ICU Vital Signs Last 24 Hrs  T(C): 36.8 (2017 08:00), Max: 36.9 (2017 03:12)  T(F): 98.2 (2017 08:00), Max: 98.5 (2017 03:12)  HR: 87 (2017 08:00) (87 - 103)  BP: 180/71 (2017 08:00) (145/99 - 180/71)  BP(mean): 102 (2017 08:00) (102 - 102)  ABP: --  ABP(mean): --  RR: 32 (2017 08:00) (18 - 32)  SpO2: 97% (2017 08:00) (96% - 99%)          I&O's Detail        LABS:                        10.2   6.4   )-----------( 161      ( 2017 02:00 )             32.5     11-24    141  |  103  |  41.0<H>  ----------------------------<  234<H>  4.1   |  24.0  |  1.54<H>    Ca    9.2      2017 02:00    TPro  7.5  /  Alb  3.9  /  TBili  0.2<L>  /  DBili  x   /  AST  20  /  ALT  21  /  AlkPhos  169<H>  11-24      CARDIAC MARKERS ( 2017 02:00 )  x     / <0.01 ng/mL / 112 U/L / x     / x          CAPILLARY BLOOD GLUCOSE      POCT Blood Glucose.: 338 mg/dL (2017 08:50)    PT/INR - ( 2017 02:00 )   PT: 11.1 sec;   INR: 1.01 ratio         PTT - ( 2017 07:21 )  PTT:87.0 sec    CULTURES:      Physical Examination:    General: No acute distress.      HEENT: Pupils equal, reactive to light.  Symmetric.    PULM: Clear to auscultation bilaterally, no significant sputum production    CVS: Regular rate and rhythm, no murmurs, rubs, or gallops    ABD: Soft, nondistended, nontender, normoactive bowel sounds, no masses    EXT: No edema, nontender    SKIN: Warm and well perfused, no rashes noted.    NEURO: Alert, oriented, interactive, nonfocal    RADIOLOGY: ***    CRITICAL CARE TIME SPENT: *** Patient is a 62y old  Female who presents with a chief complaint of STEMI (2017 02:57)      BRIEF HOSPITAL COURSE: 61 y/o F with a h/o HTN, HLD, DM, CAD (recent cardiac cath in 2017 showing 95% stenosis of LAD and 100% stenosis of RCA/Circ, good collateral circulation, not a candidate for CABG), leukemia (in remission), systolic CHF (EF: 25-30%), asthma, hypothyroid, ANIKA, presents to ED tonight with SOB and acute onset of substernal chest pain with radiation to back, left arm, and jaw- began as tightness and progressed to crushing in nature. EKG in ED showed ST-elevation in lead 3, with reciprocal ST-depressions in leads 1 and AVL, as per report, this is similar to prior EKG. Code STEMI called and interventional cardiology recommended aggressive medical management only. CP improved with nitro SL and paste. Trops neg x1.    Events last 24 hours: symptoms improved    PAST MEDICAL & SURGICAL HISTORY:  CHF (congestive heart failure)  Tuberculosis: Treated at the age of 3 years  Psoriatic arthritis  Diabetic neuropathy  Blind: Left Eye  ANIKA treated with BiPAP  Acute promyelocytic leukemia  Osteoarthritis  Hypothyroidism  Hypertension  Hyperlipidemia  Asthma  Diabetes mellitus  S/P  section: x 6  S/P carpal tunnel release  S/P cervical spinal fusion  S/P cholecystectomy  H/O abdominal hysterectomy: secondry to Fibroids      Review of Systems:  CONSTITUTIONAL: No fever, chills, or fatigue  EYES: No eye pain, visual disturbances, or discharge  ENMT:  No difficulty hearing, tinnitus, vertigo; No sinus or throat pain  NECK: No pain or stiffness  RESPIRATORY: No cough, wheezing, chills or hemoptysis; No shortness of breath  CARDIOVASCULAR: No chest pain, palpitations, dizziness, or leg swelling  GASTROINTESTINAL: No abdominal or epigastric pain. No nausea, vomiting, or hematemesis; No diarrhea or constipation. No melena or hematochezia.  GENITOURINARY: No dysuria, frequency, hematuria, or incontinence  NEUROLOGICAL: No headaches, memory loss, loss of strength, numbness, or tremors  SKIN: No itching, burning, rashes, or lesions   MUSCULOSKELETAL: No joint pain or swelling; No muscle, back, or extremity pain  PSYCHIATRIC: No depression, anxiety, mood swings, or difficulty sleeping      Medications:    carvedilol 6.25 milliGRAM(s) Oral every 12 hours  enalapril 2.5 milliGRAM(s) Oral daily  furosemide    Tablet 40 milliGRAM(s) Oral every 12 hours  hydrALAZINE 10 milliGRAM(s) Oral every 8 hours  isosorbide   mononitrate ER Tablet (IMDUR) 60 milliGRAM(s) Oral daily  nitroglycerin    2% Ointment 2 Inch(s) Transdermal every 6 hours  ranolazine 500 milliGRAM(s) Oral two times a day  tamsulosin 0.4 milliGRAM(s) Oral at bedtime    ALBUTerol    90 MICROgram(s) HFA Inhaler 2 Puff(s) Inhalation every 6 hours PRN    pregabalin 150 milliGRAM(s) Oral daily  pregabalin 300 milliGRAM(s) Oral at bedtime      aspirin  chewable 81 milliGRAM(s) Oral daily  clopidogrel Tablet 75 milliGRAM(s) Oral daily  heparin  Infusion.  Unit(s)/Hr IV Continuous <Continuous>  heparin  Injectable 4000 Unit(s) IV Push every 6 hours PRN        atorvastatin 80 milliGRAM(s) Oral at bedtime  insulin lispro (HumaLOG) corrective regimen sliding scale   SubCutaneous three times a day before meals  levothyroxine 112 MICROGram(s) Oral daily    ferrous    sulfate 325 milliGRAM(s) Oral daily      artificial  tears Solution 1 Drop(s) Both EYES daily            ICU Vital Signs Last 24 Hrs  T(C): 36.8 (2017 08:00), Max: 36.9 (2017 03:12)  T(F): 98.2 (2017 08:00), Max: 98.5 (2017 03:12)  HR: 87 (2017 08:00) (87 - 103)  BP: 180/71 (2017 08:00) (145/99 - 180/71)  BP(mean): 102 (2017 08:00) (102 - 102)  ABP: --  ABP(mean): --  RR: 32 (2017 08:00) (18 - 32)  SpO2: 97% (2017 08:00) (96% - 99%)          I&O's Detail        LABS:                        10.2   6.4   )-----------( 161      ( 2017 02:00 )             32.5     11-24    141  |  103  |  41.0<H>  ----------------------------<  234<H>  4.1   |  24.0  |  1.54<H>    Ca    9.2      2017 02:00    TPro  7.5  /  Alb  3.9  /  TBili  0.2<L>  /  DBili  x   /  AST  20  /  ALT  21  /  AlkPhos  169<H>  11-24      CARDIAC MARKERS ( 2017 02:00 )  x     / <0.01 ng/mL / 112 U/L / x     / x          CAPILLARY BLOOD GLUCOSE      POCT Blood Glucose.: 338 mg/dL (2017 08:50)    PT/INR - ( 2017 02:00 )   PT: 11.1 sec;   INR: 1.01 ratio         PTT - ( 2017 07:21 )  PTT:87.0 sec    CULTURES:      Physical Examination:    General: No acute distress.      HEENT: Pupils equal, reactive to light.  Symmetric.    PULM: Clear to auscultation bilaterally, no significant sputum production    CVS: Regular rate and rhythm, no murmurs, rubs, or gallops    ABD: Soft, nondistended, nontender, normoactive bowel sounds, no masses    EXT: No edema, nontender    SKIN: Warm and well perfused, no rashes noted.    NEURO: Alert, oriented, interactive, nonfocal    RADIOLOGY:   < from: Xray Chest 1 View AP/PA. (17 @ 02:14) >  Date and time of exam: 2017 2:08 AM.    Technique: A single AP view of the chest was obtained.    Comparison exam: 2017 12:18 AM.    Findings:  A mediastinal drain is noted, a new finding. No change in right   indwelling internal jugular central line. Persistent interstitial   prominence bilaterally, unchanged. The heart is not enlarged..    Impression:  Persistent interstitial prominence unchanged since 2017. There   appears to be a new mediastinal drain not seen on the prior study..    < end of copied text >    CRITICAL CARE TIME SPENT:

## 2017-11-24 NOTE — H&P ADULT - NEGATIVE NEUROLOGICAL SYMPTOMS
no difficulty walking/no generalized seizures/no transient paralysis/no paresthesias/no weakness/no loss of consciousness/no hemiparesis/no loss of sensation/no headache/no syncope/no confusion

## 2017-11-24 NOTE — CONSULT NOTE ADULT - NEGATIVE NEUROLOGICAL SYMPTOMS
no transient paralysis/no paresthesias/no syncope/no focal seizures/no headache/no generalized seizures

## 2017-11-24 NOTE — CONSULT NOTE ADULT - ATTENDING COMMENTS
Patient with known history of CAD. Failed intervention in July. Has been medically managed. Patient planned for high risk LAD PCI on 11/29/2017 at [place].   If patient is troponin negative, can medically manage and discharge patient in the next 24-48 hours. If is troponin +, will have to consider transfer to [place]. Planned for procedure with Dr. Bryson Hayes.

## 2017-11-24 NOTE — PROGRESS NOTE ADULT - ASSESSMENT
Patient is a 62 year old female with PMH of HTN, HLD, DM, Retinopathy (legally blind), Psoriasis, CAD (recent cardiac cath in July 2017 showing 95% stenosis of LAD and 100% stenosis of RCA/Circ, good collateral circulation, not a candidate for CABG), leukemia (in remission), systolic CHF (EF: 25-30%), asthma, hypothyroid, and ANIKA who presented to the ED with complaints of SOB and acute onset of substernal chest pain with radiation to back, left arm, and jaw. s/p CODE STEMI and admitted for unstable angina management      Problem/Recommendation - 1:  Problem: Unstable angina pectoris due to coronary arteriosclerosis.   -Chest pain free - Downgrade to Hospitalist Service  -Patient with known history of CAD. Failed intervention in July. Has been medically managed.   -Patient planned for high risk LAD PCI on 11/29/2017 at [place].   -TNI slightly trending up to 0.09-->0.13   -EKG with ST elevations in lead III, aVF (seen on previous EKG in July)  -Started on heparin gtt  -Loaded with Plavix on arrival  -Nitropaste PRN and continue Imdur   -As per cardiology continue medical management - ASA/Plavix, Lipitor and Enalapril   -Beta-blockade with coreg  -Continue Ranexa  -Follow up further cardiology recommendations in AM - patient may require transfer to [facility] if TNI continue to trend up.   -Discussed with Dr. Croft     Problem/Recommendation - 2:  ·  Problem: Essential hypertension.  Recommendation: Continue Coreg and enalapril with holding parameters. Low sodium diet.     Problem/Recommendation - 3:  ·  Problem: Pure hypercholesterolemia.  Recommendation: Continue Lipitor.     Problem/Recommendation - 4:  ·  Problem: CHF. Continue medical management. Lasix BID. Strict I/Os, daily weights. TTE reviewed.  Summary:   1. Left ventricular ejection fraction, by visual estimation, is 35 to   40%.   2. Moderately decreased segmental left ventricular systolic function.   3. Endocardial visualization was enhanced with intravenous echo contrast.   4. Limited acoustic windows due to habitus.   5. Compared to study dated 11/15/17, left ventricular systolic function   appears improved.   6. Mid and apical inferior wall, basal and mid inferolateral wall, mid   anterolateral segment, and apical lateral segment are abnormal as   described above.     Problem/Recommendation - 5:  ·  Problem: DM. Plan: Continue Lantus 50 units at bedtime. ISS.     Problem/Recommendation - 6:  ·  Problem: Hypothyroidism. Plan: Continue Synthroid     Problem/Recommendation - 7:  ·  Problem: DVT prophylaxis. Plan: On heparin gtt

## 2017-11-24 NOTE — ED PROVIDER NOTE - PROGRESS NOTE DETAILS
Consulted [facility]. Stated not to catheterize, but to maintain at medical management. heparin drip and lead her with plavix.

## 2017-11-24 NOTE — PATIENT PROFILE ADULT. - TRANSFUSION REACTION, PREVIOUS, PROFILE
Med:  HYDROcodone-acetaminophen (NORCO) 5-325 MG per tablet  Si-2 po q 4-6 prn pain  Quantity requested:  90      Mail Order: no - Call Patient on cell number when ready to  the Prescription.         no

## 2017-11-24 NOTE — PROGRESS NOTE ADULT - PROBLEM SELECTOR PLAN 1
No intervention at this time given history, as per interventional cardiology. Continue heparin gtt, follow PTT's. Loaded with Plavix and ASA in ED, continue going forward. Nitro paste for recurrent CP, will escalate to nitro gtt if pain becomes persistent. Start outpatient BB, ACE-I, ranolazine, nitrate, high dose statin. Continue to closely monitor hemodynamics. TTE ordered. DASH diet. Will check pro-BNP, lipid panel and HgBA1c. Trops neg x1, will trend. Cardiology following. If second trop negative may be transferred out to telemetry- if positive may have intervention possibly at [facility]

## 2017-11-24 NOTE — CONSULT NOTE ADULT - PROBLEM SELECTOR RECOMMENDATION 9
Admit to MICU, CBC, BMP, CEx3, , FLP, ECG, CXR   Start Heparin gtt at full anticoagulation, Plavix 600 mg oral x1 then 75 mg daily  Nitropaste 1 inch to ACW and continue daily Imdur maximized therapy   Continue ASA, BB, ACEI and Statin therapy  Cardiology to follow in AM

## 2017-11-24 NOTE — H&P ADULT - PROBLEM SELECTOR PLAN 1
No intervention at this time given history, as per interventional cardiology. Continue heparin gtt, follow PTT's. Loaded with Plavix and ASA in ED, continue going forward. Nitro paste for recurrent CP, will escalate to nitro gtt if pain becomes persistent. Start outpatient BB, ACE-I, ranolazine, nitrate, high dose statin. Continue to closely monitor hemodynamics. TTE ordered. DASH diet. Will check pro-BNP, lipid panel and HgBA1c. Trops neg x1, will trend. Cardiology following.

## 2017-11-24 NOTE — ED ADULT NURSE REASSESSMENT NOTE - NS ED NURSE REASSESS COMMENT FT1
SANDRA Zheng at bedside to discuss treatment options and plan of care. pt remains stable at this time. will continue to monitor.
Pt A&Ox4 offers no complaint at this time, pain as resolved. Pt resting comfortably, VSS, no signs of distress at this time, CM in place, in NSR, Heparin gtt running as ordered, Admitted to MICU awaiting bed, safety maintained, call bell in reach.

## 2017-11-24 NOTE — ED PROVIDER NOTE - MEDICAL DECISION MAKING DETAILS
CODE STEMI cancelled by cardio Gandotra as pt has known severe stenosis and unable to be intervened on. there is isolated CINTHIA in lead III with reciprical changes, however this is unchanged from ekg 7/2017 and 11/6/2017. After heparin, plavix, nitro pain improved. pt admitted to ICU

## 2017-11-24 NOTE — ED PROVIDER NOTE - OBJECTIVE STATEMENT
61 y/o F pt with a PMHx of acute promyelocytic leukemia, asthma, blind, DM, diabetic neuropathy, hyperlipidemia, HTN, Hypothyroidism, ANIKA tx with BiPAP, Osteoarthritis, psoriatic arthritis and Tuberculosis presents to the ED c/o acute onset chest tightness and sob that onset today. Explains that it started as a tightness then exacerbated to a crushing pain in her chest. Localizes the pain to her sternal region. PTA she took 5 81mg Aspirin. PT also took 5 doses of sublingual Nitro at home. She had a cardiac catheterization where they discovered that she had heavily calcified RCA, 100 percent blockage of L circumflex and 95% of LAD with good collateral flow and unable to be stented. Referred to surgery for CABG but she was not deemed a surgical candidate. CODE STEMI ACTIVATED. Allergic to Cipro, Demerol HCL, iodine, loperamide, tramadol, iodine contrast, phenylephedrine derivatives.   Cardiologist: Dr. Guallpa.

## 2017-11-24 NOTE — H&P ADULT - RS GEN PE MLT RESP DETAILS PC
diminished breath sounds, L/good air movement/respirations non-labored/no rales/diminished breath sounds, R/airway patent/no wheezes/no subcutaneous emphysema/breath sounds equal/no intercostal retractions/clear to auscultation bilaterally/no rhonchi

## 2017-11-24 NOTE — PROGRESS NOTE ADULT - SUBJECTIVE AND OBJECTIVE BOX
HOSPITALIST ACCEPTANCE NOTE    CHIEF COMPLAINT/INTERVAL HISTORY:    Patient is a 62y old  Female who presents with a chief complaint of STEMI (24 Nov 2017 02:57)    HPI: Patient is a 62 year old female with PMH of HTN, HLD, DM, Retinopathy (legally blind), Psoriasis, CAD (recent cardiac cath in July 2017 showing 95% stenosis of LAD and 100% stenosis of RCA/Circ, good collateral circulation, not a candidate for CABG), leukemia (in remission), systolic CHF (EF: 25-30%), asthma, hypothyroid, and ANIKA who presented to the ED with complaints of SOB and acute onset of substernal chest pain with radiation to back, left arm, and jaw. Pain started as tightness and progressed to a crushing sensastion. EKG in ED showed ST-elevation in lead 3, with reciprocal ST-depressions in leads 1 and AVL, as per report, this is similar to prior EKG. Troponins have been trending up. Cardiology recommended aggressive medical management. CP has improved with nitro SL and paste and therefore patient was downgraded to the hospitalist service. Patient seen and examined at bedside. Awake, alert, and not in acute distress. Reports that CP is intermittent and has improved. Currently hemodynamically stable. Reports that she has an appointment next week at [place] on 11/29 for high risk stenting with Dr. Bryson Hayes.     ROS: No chest pain, palpitations, SOB, lightheadedness, dizziness, headache, nausea/vomiting, fevers/chills, abdominal pain, dysuria or increased urinary frequency.    ICU Vital Signs Last 24 Hrs  T(C): 36.7 (24 Nov 2017 16:00), Max: 36.9 (24 Nov 2017 03:12)  T(F): 98 (24 Nov 2017 16:00), Max: 98.5 (24 Nov 2017 03:12)  HR: 78 (24 Nov 2017 18:00) (78 - 103)  BP: 93/44 (24 Nov 2017 18:00) (93/44 - 180/71)  BP(mean): 62 (24 Nov 2017 18:00) (62 - 106)  RR: 35 (24 Nov 2017 18:00) (17 - 45)  SpO2: 97% (24 Nov 2017 18:00) (95% - 99%)    MEDICATIONS  (STANDING):  artificial  tears Solution 1 Drop(s) Both EYES daily  aspirin  chewable 81 milliGRAM(s) Oral daily  atorvastatin 80 milliGRAM(s) Oral at bedtime  carvedilol 6.25 milliGRAM(s) Oral every 12 hours  clopidogrel Tablet 75 milliGRAM(s) Oral daily  enalapril 2.5 milliGRAM(s) Oral daily  ferrous    sulfate 325 milliGRAM(s) Oral daily  furosemide    Tablet 40 milliGRAM(s) Oral every 12 hours  heparin  Infusion.  Unit(s)/Hr (10 mL/Hr) IV Continuous <Continuous>  hydrALAZINE 10 milliGRAM(s) Oral every 8 hours  insulin glargine Injectable (LANTUS) 50 Unit(s) SubCutaneous every morning  insulin lispro (HumaLOG) corrective regimen sliding scale   SubCutaneous three times a day before meals  isosorbide   mononitrate ER Tablet (IMDUR) 60 milliGRAM(s) Oral daily  levothyroxine 112 MICROGram(s) Oral daily  nitroglycerin    2% Ointment 2 Inch(s) Transdermal every 6 hours  pregabalin 150 milliGRAM(s) Oral daily  pregabalin 300 milliGRAM(s) Oral at bedtime  ranolazine 500 milliGRAM(s) Oral two times a day  tamsulosin 0.4 milliGRAM(s) Oral at bedtime    MEDICATIONS  (PRN):  ALBUTerol    90 MICROgram(s) HFA Inhaler 2 Puff(s) Inhalation every 6 hours PRN Shortness of Breath and/or Wheezing  guaiFENesin    Syrup 100 milliGRAM(s) Oral every 6 hours PRN Cough  heparin  Injectable 4000 Unit(s) IV Push every 6 hours PRN For aPTT less than 40      LABS:                        10.2   6.4   )-----------( 161      ( 24 Nov 2017 02:00 )             32.5     11-24    141  |  103  |  41.0<H>  ----------------------------<  234<H>  4.1   |  24.0  |  1.54<H>    Ca    9.2      24 Nov 2017 02:00    TPro  7.5  /  Alb  3.9  /  TBili  0.2<L>  /  DBili  x   /  AST  20  /  ALT  21  /  AlkPhos  169<H>  11-24    PT/INR - ( 24 Nov 2017 02:00 )   PT: 11.1 sec;   INR: 1.01 ratio         PTT - ( 24 Nov 2017 17:25 )  PTT:45.8 sec      CAPILLARY BLOOD GLUCOSE      POCT Blood Glucose.: 163 mg/dL (24 Nov 2017 16:22)  POCT Blood Glucose.: 281 mg/dL (24 Nov 2017 12:06)  POCT Blood Glucose.: 338 mg/dL (24 Nov 2017 08:50)      PHYSICAL EXAM:  GEN: Morbidly obese female, laying in bed, not in acute distress  CVS: RRR, audible S1, S2, no murmurs  Resp: bilateral air entry  Abd: soft, obese, nontender  Ext: + pitting edema  Skin: Psoriatic skin lesions bilateral LE

## 2017-11-24 NOTE — ED ADULT TRIAGE NOTE - CHIEF COMPLAINT QUOTE
sierra from home sob, as per ems she took 5 nitro and 5x 81 mg  she was in this hospital about 8-10 ago with HF cath scheduled for the upcoming Wednesday  right upper chest wall power port sierra from home sob, as per ems she took 5 nitro and 5x 81 mg  she was in this hospital about 8-10 ago with HF cath scheduled for the upcoming Wednesday  right upper chest wall power port, Dr Escamilla at bedside St[facility] called, code team 2 at bedside  Dr Escamilla at bedside evaluating pt, code Stemi called

## 2017-11-24 NOTE — ED ADULT NURSE REASSESSMENT NOTE - COMFORT CARE
plan of care explained/side rails up/treatment delay explained/wait time explained/warm blanket provided

## 2017-11-24 NOTE — H&P ADULT - PMH
Acute promyelocytic leukemia    Asthma    Blind  Left Eye  CHF (congestive heart failure)    Diabetes mellitus    Diabetic neuropathy    Hyperlipidemia    Hypertension    Hypothyroidism    ANIKA treated with BiPAP    Osteoarthritis    Psoriatic arthritis    Tuberculosis  Treated at the age of 3 years

## 2017-11-24 NOTE — ED ADULT NURSE NOTE - OBJECTIVE STATEMENT
pt BIBA. pt received Alert and Oriented to person, place, situation and time laying in bed. pt c/o chest pain that started around 2200. Pt states she had a cough for 1 week. tonight she became shortness of breath with crushing chest pain. pt states she took a total of 5 baby aspirin at home. Pt has a port on the right chest wall. code stemi was activated and code team 2 at bedside with MD Escamilla.

## 2017-11-24 NOTE — H&P ADULT - HISTORY OF PRESENT ILLNESS
63 y/o F with a h/o HTN, HLD, DM, CAD (recent cardiac cath in July 2017 showing 95% stenosis of LAD and 100% stenosis of RCA/Circ, good collateral circulation, not a candidate for CABG), leukemia (in remission), asthma, hypothyroid, ANIKA, presents to ED tonight with acute onset of substernal chest pain with radiation to back, left arm, and jaw- began as tightness and progressed to crushing in nature. 63 y/o F with a h/o HTN, HLD, DM, CAD (recent cardiac cath in July 2017 showing 95% stenosis of LAD and 100% stenosis of RCA/Circ, good collateral circulation, not a candidate for CABG), leukemia (in remission), asthma, hypothyroid, ANIKA, presents to ED tonight with SOB and acute onset of substernal chest pain with radiation to back, left arm, and jaw- began as tightness and progressed to crushing in nature. EKG in ED showed ST-elevation in lead 3, with reciprocal ST-depressions in leads 1 and AVL, as per report, this is similar to prior EKG. Code STEMI called and interventional cardiology recommended aggressive medical management only. CP improved with nitro SL and paste.     Patient seen and examined in ED. Awake, alert, and conversant. Reports that CP is continuing to improve. Currently hemodynamically stable. Reports that she has an appointment next week in [place] for high risk stenting with Dr. Bryson Hayes. 61 y/o F with a h/o HTN, HLD, DM, CAD (recent cardiac cath in July 2017 showing 95% stenosis of LAD and 100% stenosis of RCA/Circ, good collateral circulation, not a candidate for CABG), leukemia (in remission), systolic CHF (EF: 25-30%), asthma, hypothyroid, ANIKA, presents to ED tonight with SOB and acute onset of substernal chest pain with radiation to back, left arm, and jaw- began as tightness and progressed to crushing in nature. EKG in ED showed ST-elevation in lead 3, with reciprocal ST-depressions in leads 1 and AVL, as per report, this is similar to prior EKG. Code STEMI called and interventional cardiology recommended aggressive medical management only. CP improved with nitro SL and paste. Trops neg x1.    Patient seen and examined in ED. Awake, alert, and conversant. Reports that CP is continuing to improve. Currently hemodynamically stable. Reports that she has an appointment next week in [place] for high risk stenting with Dr. Bryson Hayes.

## 2017-11-24 NOTE — ED ADULT NURSE NOTE - CHIEF COMPLAINT QUOTE
sierra from home sob, as per ems she took 5 nitro and 5x 81 mg  she was in this hospital about 8-10 ago with HF cath scheduled for the upcoming Wednesday  right upper chest wall power port

## 2017-11-24 NOTE — PROGRESS NOTE ADULT - ASSESSMENT
61 y/o F with a h/o HTN, HLD, DM, CAD (recent cardiac cath in July 2017 showing 95% stenosis of LAD and 100% stenosis of RCA/Circ, good collateral circulation, not a candidate for CABG), leukemia (in remission), systolic CHF (EF: 25-30%), asthma, hypothyroid, ANIKA, with unstable angina, possible STEMI.

## 2017-11-24 NOTE — H&P ADULT - ASSESSMENT
63 y/o F with a h/o HTN, HLD, DM, CAD (recent cardiac cath in July 2017 showing 95% stenosis of LAD and 100% stenosis of RCA/Circ, good collateral circulation, not a candidate for CABG), leukemia (in remission), systolic CHF (EF: 25-30%), asthma, hypothyroid, ANIKA, with unstable angina, possible STEMI. 61 y/o F with a h/o HTN, HLD, DM, CAD (recent cardiac cath in July 2017 showing 95% stenosis of LAD and 100% stenosis of RCA/Circ, good collateral circulation, not a candidate for CABG), leukemia (in remission), systolic CHF (EF: 25-30%), asthma, hypothyroid, ANIKA, with unstable angina, possible STEMI.    Critical care time spent: 59 mins

## 2017-11-24 NOTE — CONSULT NOTE ADULT - ASSESSMENT
61 yo F arrived form home with ongoing SSCP, diaphoresis, SOB and Left jaw/arm pain s/p CODE STEMI. In light of known CAD with severe LAD and RCA lesions and established collateral flow patient admitted for unstable angina management

## 2017-11-24 NOTE — H&P ADULT - PROBLEM SELECTOR PLAN 2
Continue with routine diuresis. HR and BP control. CXR appears to show pulmonary edema. Supplemental O2 PRN. F/u TTE results.

## 2017-11-25 LAB
ANION GAP SERPL CALC-SCNC: 12 MMOL/L — SIGNIFICANT CHANGE UP (ref 5–17)
ANISOCYTOSIS BLD QL: SLIGHT — SIGNIFICANT CHANGE UP
APTT BLD: 177.8 SEC — CRITICAL HIGH (ref 27.5–37.4)
APTT BLD: 28.4 SEC — SIGNIFICANT CHANGE UP (ref 27.5–37.4)
APTT BLD: 30 SEC — SIGNIFICANT CHANGE UP (ref 27.5–37.4)
APTT BLD: 56.1 SEC — HIGH (ref 27.5–37.4)
BUN SERPL-MCNC: 37 MG/DL — HIGH (ref 8–20)
CALCIUM SERPL-MCNC: 8.4 MG/DL — LOW (ref 8.6–10.2)
CHLORIDE SERPL-SCNC: 107 MMOL/L — SIGNIFICANT CHANGE UP (ref 98–107)
CO2 SERPL-SCNC: 25 MMOL/L — SIGNIFICANT CHANGE UP (ref 22–29)
CREAT SERPL-MCNC: 1.26 MG/DL — SIGNIFICANT CHANGE UP (ref 0.5–1.3)
EOSINOPHIL NFR BLD AUTO: 6 % — HIGH (ref 0–5)
GLUCOSE BLDC GLUCOMTR-MCNC: 174 MG/DL — HIGH (ref 70–99)
GLUCOSE BLDC GLUCOMTR-MCNC: 176 MG/DL — HIGH (ref 70–99)
GLUCOSE BLDC GLUCOMTR-MCNC: 185 MG/DL — HIGH (ref 70–99)
GLUCOSE BLDC GLUCOMTR-MCNC: 295 MG/DL — HIGH (ref 70–99)
GLUCOSE BLDC GLUCOMTR-MCNC: 306 MG/DL — HIGH (ref 70–99)
GLUCOSE SERPL-MCNC: 177 MG/DL — HIGH (ref 70–115)
HCT VFR BLD CALC: 27.4 % — LOW (ref 37–47)
HCT VFR BLD CALC: 27.6 % — LOW (ref 37–47)
HGB BLD-MCNC: 8.6 G/DL — LOW (ref 12–16)
HGB BLD-MCNC: 8.8 G/DL — LOW (ref 12–16)
HYPOCHROMIA BLD QL: SLIGHT — SIGNIFICANT CHANGE UP
LYMPHOCYTES # BLD AUTO: 21 % — SIGNIFICANT CHANGE UP (ref 20–55)
MAGNESIUM SERPL-MCNC: 1.7 MG/DL — SIGNIFICANT CHANGE UP (ref 1.6–2.6)
MCHC RBC-ENTMCNC: 29.8 PG — SIGNIFICANT CHANGE UP (ref 27–31)
MCHC RBC-ENTMCNC: 30.1 PG — SIGNIFICANT CHANGE UP (ref 27–31)
MCHC RBC-ENTMCNC: 31.4 G/DL — LOW (ref 32–36)
MCHC RBC-ENTMCNC: 31.9 G/DL — LOW (ref 32–36)
MCV RBC AUTO: 94.5 FL — SIGNIFICANT CHANGE UP (ref 81–99)
MCV RBC AUTO: 94.8 FL — SIGNIFICANT CHANGE UP (ref 81–99)
MONOCYTES NFR BLD AUTO: 8 % — SIGNIFICANT CHANGE UP (ref 3–10)
NEUTROPHILS NFR BLD AUTO: 64 % — SIGNIFICANT CHANGE UP (ref 37–73)
NEUTS BAND # BLD: 1 % — SIGNIFICANT CHANGE UP (ref 0–8)
OVALOCYTES BLD QL SMEAR: SLIGHT — SIGNIFICANT CHANGE UP
PHOSPHATE SERPL-MCNC: 3.6 MG/DL — SIGNIFICANT CHANGE UP (ref 2.4–4.7)
PLAT MORPH BLD: NORMAL — SIGNIFICANT CHANGE UP
PLATELET # BLD AUTO: 132 K/UL — LOW (ref 150–400)
PLATELET # BLD AUTO: 133 K/UL — LOW (ref 150–400)
POIKILOCYTOSIS BLD QL AUTO: SLIGHT — SIGNIFICANT CHANGE UP
POLYCHROMASIA BLD QL SMEAR: SLIGHT — SIGNIFICANT CHANGE UP
POTASSIUM SERPL-MCNC: 4 MMOL/L — SIGNIFICANT CHANGE UP (ref 3.5–5.3)
POTASSIUM SERPL-SCNC: 4 MMOL/L — SIGNIFICANT CHANGE UP (ref 3.5–5.3)
RBC # BLD: 2.89 M/UL — LOW (ref 4.4–5.2)
RBC # BLD: 2.92 M/UL — LOW (ref 4.4–5.2)
RBC # FLD: 13.3 % — SIGNIFICANT CHANGE UP (ref 11–15.6)
RBC # FLD: 13.3 % — SIGNIFICANT CHANGE UP (ref 11–15.6)
RBC BLD AUTO: ABNORMAL
SODIUM SERPL-SCNC: 144 MMOL/L — SIGNIFICANT CHANGE UP (ref 135–145)
TROPONIN T SERPL-MCNC: 0.08 NG/ML — HIGH (ref 0–0.06)
TROPONIN T SERPL-MCNC: 0.1 NG/ML — HIGH (ref 0–0.06)
WBC # BLD: 4.3 K/UL — LOW (ref 4.8–10.8)
WBC # BLD: 4.9 K/UL — SIGNIFICANT CHANGE UP (ref 4.8–10.8)
WBC # FLD AUTO: 4.3 K/UL — LOW (ref 4.8–10.8)
WBC # FLD AUTO: 4.9 K/UL — SIGNIFICANT CHANGE UP (ref 4.8–10.8)

## 2017-11-25 PROCEDURE — 99233 SBSQ HOSP IP/OBS HIGH 50: CPT

## 2017-11-25 RX ORDER — PANTOPRAZOLE SODIUM 20 MG/1
40 TABLET, DELAYED RELEASE ORAL
Qty: 0 | Refills: 0 | Status: DISCONTINUED | OUTPATIENT
Start: 2017-11-25 | End: 2017-11-28

## 2017-11-25 RX ORDER — HEPARIN SODIUM 5000 [USP'U]/ML
4000 INJECTION INTRAVENOUS; SUBCUTANEOUS EVERY 6 HOURS
Qty: 0 | Refills: 0 | Status: DISCONTINUED | OUTPATIENT
Start: 2017-11-25 | End: 2017-11-26

## 2017-11-25 RX ORDER — HYDRALAZINE HCL 50 MG
5 TABLET ORAL ONCE
Qty: 0 | Refills: 0 | Status: DISCONTINUED | OUTPATIENT
Start: 2017-11-25 | End: 2017-11-25

## 2017-11-25 RX ORDER — HEPARIN SODIUM 5000 [USP'U]/ML
5000 INJECTION INTRAVENOUS; SUBCUTANEOUS EVERY 6 HOURS
Qty: 0 | Refills: 0 | Status: DISCONTINUED | OUTPATIENT
Start: 2017-11-25 | End: 2017-11-25

## 2017-11-25 RX ORDER — HEPARIN SODIUM 5000 [USP'U]/ML
INJECTION INTRAVENOUS; SUBCUTANEOUS
Qty: 25000 | Refills: 0 | Status: DISCONTINUED | OUTPATIENT
Start: 2017-11-25 | End: 2017-11-25

## 2017-11-25 RX ORDER — HEPARIN SODIUM 5000 [USP'U]/ML
10000 INJECTION INTRAVENOUS; SUBCUTANEOUS EVERY 6 HOURS
Qty: 0 | Refills: 0 | Status: DISCONTINUED | OUTPATIENT
Start: 2017-11-25 | End: 2017-11-25

## 2017-11-25 RX ORDER — HYDRALAZINE HCL 50 MG
25 TABLET ORAL EVERY 8 HOURS
Qty: 0 | Refills: 0 | Status: DISCONTINUED | OUTPATIENT
Start: 2017-11-25 | End: 2017-11-27

## 2017-11-25 RX ORDER — HEPARIN SODIUM 5000 [USP'U]/ML
INJECTION INTRAVENOUS; SUBCUTANEOUS
Qty: 25000 | Refills: 0 | Status: DISCONTINUED | OUTPATIENT
Start: 2017-11-25 | End: 2017-11-27

## 2017-11-25 RX ADMIN — Medication 1 DROP(S): at 22:36

## 2017-11-25 RX ADMIN — Medication 2: at 08:18

## 2017-11-25 RX ADMIN — CARVEDILOL PHOSPHATE 6.25 MILLIGRAM(S): 80 CAPSULE, EXTENDED RELEASE ORAL at 05:37

## 2017-11-25 RX ADMIN — Medication 40 MILLIGRAM(S): at 19:40

## 2017-11-25 RX ADMIN — Medication 2.5 MILLIGRAM(S): at 05:37

## 2017-11-25 RX ADMIN — Medication 2 INCH(S): at 01:28

## 2017-11-25 RX ADMIN — HEPARIN SODIUM 1000 UNIT(S)/HR: 5000 INJECTION INTRAVENOUS; SUBCUTANEOUS at 13:30

## 2017-11-25 RX ADMIN — Medication 2: at 11:38

## 2017-11-25 RX ADMIN — TAMSULOSIN HYDROCHLORIDE 0.4 MILLIGRAM(S): 0.4 CAPSULE ORAL at 22:37

## 2017-11-25 RX ADMIN — RANOLAZINE 500 MILLIGRAM(S): 500 TABLET, FILM COATED, EXTENDED RELEASE ORAL at 22:36

## 2017-11-25 RX ADMIN — Medication 6: at 17:21

## 2017-11-25 RX ADMIN — Medication 300 MILLIGRAM(S): at 22:38

## 2017-11-25 RX ADMIN — CARVEDILOL PHOSPHATE 6.25 MILLIGRAM(S): 80 CAPSULE, EXTENDED RELEASE ORAL at 19:34

## 2017-11-25 RX ADMIN — Medication 1 DROP(S): at 19:33

## 2017-11-25 RX ADMIN — Medication 325 MILLIGRAM(S): at 14:06

## 2017-11-25 RX ADMIN — Medication 10 MILLIGRAM(S): at 06:44

## 2017-11-25 RX ADMIN — HEPARIN SODIUM 4000 UNIT(S): 5000 INJECTION INTRAVENOUS; SUBCUTANEOUS at 23:12

## 2017-11-25 RX ADMIN — Medication 40 MILLIGRAM(S): at 05:37

## 2017-11-25 RX ADMIN — Medication 1 DROP(S): at 05:36

## 2017-11-25 RX ADMIN — Medication 150 MILLIGRAM(S): at 14:14

## 2017-11-25 RX ADMIN — CLOPIDOGREL BISULFATE 75 MILLIGRAM(S): 75 TABLET, FILM COATED ORAL at 14:10

## 2017-11-25 RX ADMIN — ATORVASTATIN CALCIUM 80 MILLIGRAM(S): 80 TABLET, FILM COATED ORAL at 22:36

## 2017-11-25 RX ADMIN — Medication 112 MICROGRAM(S): at 05:37

## 2017-11-25 RX ADMIN — Medication 100 MILLIGRAM(S): at 05:36

## 2017-11-25 RX ADMIN — RANOLAZINE 500 MILLIGRAM(S): 500 TABLET, FILM COATED, EXTENDED RELEASE ORAL at 05:37

## 2017-11-25 RX ADMIN — INSULIN GLARGINE 50 UNIT(S): 100 INJECTION, SOLUTION SUBCUTANEOUS at 08:11

## 2017-11-25 RX ADMIN — Medication 25 MILLIGRAM(S): at 14:14

## 2017-11-25 RX ADMIN — HEPARIN SODIUM 200 UNIT(S)/HR: 5000 INJECTION INTRAVENOUS; SUBCUTANEOUS at 01:26

## 2017-11-25 RX ADMIN — Medication 81 MILLIGRAM(S): at 14:15

## 2017-11-25 RX ADMIN — HEPARIN SODIUM 1300 UNIT(S)/HR: 5000 INJECTION INTRAVENOUS; SUBCUTANEOUS at 23:00

## 2017-11-25 RX ADMIN — Medication 1 DROP(S): at 14:12

## 2017-11-25 RX ADMIN — PANTOPRAZOLE SODIUM 40 MILLIGRAM(S): 20 TABLET, DELAYED RELEASE ORAL at 14:14

## 2017-11-25 RX ADMIN — ISOSORBIDE MONONITRATE 60 MILLIGRAM(S): 60 TABLET, EXTENDED RELEASE ORAL at 14:11

## 2017-11-25 NOTE — PROGRESS NOTE ADULT - ASSESSMENT
Patient is a 62 year old female with PMH of HTN, HLD, DM, Retinopathy (legally blind), Psoriasis, CAD (recent cardiac cath in July 2017 showing 95% stenosis of LAD and 100% stenosis of RCA/Circ, good collateral circulation, not a candidate for CABG), leukemia (in remission), systolic CHF (EF: 25-30%), asthma, hypothyroid, and ANIKA who presented to the ED with complaints of SOB and acute onset of substernal chest pain with radiation to back, left arm, and jaw. s/p CODE STEMI and admitted for unstable angina management     Assessment/Plan:    1.  Unstable angina pectoris due to coronary arteriosclerosis- Failed intervention in July has been medically managed. Scheduled for high risk LAD PCI on 11/29/17 at [place].   IV heparin/ plavix/bb/Imdur. Enlapril.lipitor ordered. With ranexa.   telemetry monitoring.   Trend cardiac enzymes.    2.  Essential hypertension.  Recommendation: Continue Coreg and enalapril with holding parameters. Low sodium diet.    3.Pure hypercholesterolemia.  Recommendation: Continue Lipitor.    4. Acute on chronic systolic chf; Lasix bid,  Strict I/Os, daily weights. TTE reviewed.    5.  DM. Plan: Continue Lantus 50 units at bedtime. ISS. Patient is a 62 year old female with PMH of HTN, HLD, DM, Retinopathy (legally blind), Psoriasis, CAD (recent cardiac cath in July 2017 showing 95% stenosis of LAD and 100% stenosis of RCA/Circ, good collateral circulation, not a candidate for CABG), leukemia (in remission), systolic CHF (EF: 25-30%), asthma, hypothyroid, and AINKA who presented to the ED with complaints of SOB and acute onset of substernal chest pain with radiation to back, left arm, and jaw. s/p CODE STEMI and admitted for unstable angina management     Assessment/Plan:    1.  Unstable angina pectoris due to coronary arteriosclerosis- Failed intervention in July has been medically managed. Scheduled for high risk LAD PCI on 11/29/17 at [place].   IV heparin/ plavix/bb/Imdur. Enlapril.lipitor ordered. With ranexa.   telemetry monitoring.   Trend cardiac enzymes.    2.  Essential hypertension.  Recommendation: Continue Coreg and enalapril with holding parameters. Low sodium diet.    3.Pure hypercholesterolemia.  Recommendation: Continue Lipitor.    4. Acute on chronic systolic chf; Lasix bid,  Strict I/Os, daily weights. TTE reviewed.    5. MEGHA: Resolved. MOnitor bmp Patient is a 62 year old female with PMH of HTN, HLD, DM, Retinopathy (legally blind), Psoriasis, CAD (recent cardiac cath in July 2017 showing 95% stenosis of LAD and 100% stenosis of RCA/Circ, good collateral circulation, not a candidate for CABG), leukemia (in remission), systolic CHF (EF: 25-30%), asthma, hypothyroid, and ANIKA who presented to the ED with complaints of SOB and acute onset of substernal chest pain with radiation to back, left arm, and jaw. s/p CODE STEMI and admitted for unstable angina management     Assessment/Plan:    1.  Unstable angina pectoris due to coronary arteriosclerosis- Failed intervention in July has been medically managed. Scheduled for high risk LAD PCI on 11/29/17 at [place].   IV heparin/ plavix/bb/Imdur. Enlapril.lipitor ordered. With ranexa.   telemetry monitoring.   Trend cardiac enzymes.    2.  Essential hypertension .Continue Coreg and enalapril with holding parameters. Low sodium diet. Increased hydralazine to 25mg PO TID. MOnitor bp     3.Pure hypercholesterolemia.  Recommendation: Continue Lipitor.    4. Acute on chronic systolic CHF; Lasix bid, on ACEi   Strict I/Os, daily weights. TTE reviewed.    5. MEGHA: Resolved. Monitor bmp

## 2017-11-25 NOTE — PROGRESS NOTE ADULT - SUBJECTIVE AND OBJECTIVE BOX
GEMINI ROONEY  [id number]        Chief Complaint: f/u unstable angina      Subjective: cp better, no sob/palps      24 hour Tele: Sr, no events        ALBUTerol    90 MICROgram(s) HFA Inhaler 2 Puff(s) Inhalation every 6 hours PRN  aluminum hydroxide/magnesium hydroxide/simethicone Suspension 30 milliLiter(s) Oral every 4 hours PRN  artificial  tears Solution 1 Drop(s) Both EYES daily PRN  aspirin  chewable 81 milliGRAM(s) Oral daily  atorvastatin 80 milliGRAM(s) Oral at bedtime  carvedilol 6.25 milliGRAM(s) Oral every 12 hours  clopidogrel Tablet 75 milliGRAM(s) Oral daily  enalapril 2.5 milliGRAM(s) Oral daily  ferrous    sulfate 325 milliGRAM(s) Oral daily  furosemide    Tablet 40 milliGRAM(s) Oral every 12 hours  guaiFENesin    Syrup 100 milliGRAM(s) Oral every 6 hours PRN  heparin  Infusion.  Unit(s)/Hr IV Continuous <Continuous>  heparin  Injectable 4000 Unit(s) IV Push every 6 hours PRN  hydrALAZINE 25 milliGRAM(s) Oral every 8 hours  insulin glargine Injectable (LANTUS) 50 Unit(s) SubCutaneous every morning  insulin lispro (HumaLOG) corrective regimen sliding scale   SubCutaneous three times a day before meals  isosorbide   mononitrate ER Tablet (IMDUR) 60 milliGRAM(s) Oral daily  levothyroxine 112 MICROGram(s) Oral daily  nitroglycerin    2% Ointment 2 Inch(s) Transdermal every 6 hours  pantoprazole    Tablet 40 milliGRAM(s) Oral before breakfast  prednisoLONE  forte 1% Suspension 1 Drop(s) Left EYE four times a day  pregabalin 150 milliGRAM(s) Oral daily  pregabalin 300 milliGRAM(s) Oral at bedtime  ranolazine 500 milliGRAM(s) Oral two times a day  tamsulosin 0.4 milliGRAM(s) Oral at bedtime          Physical Exam:  T(C): 36.8 (11-25-17 @ 12:00), Max: 36.8 (11-25-17 @ 12:00)  HR: 74 (11-25-17 @ 11:36) (67 - 87)  BP: 126/58 (11-25-17 @ 11:36) (88/55 - 165/71)  RR: 15 (11-25-17 @ 11:36) (12 - 40)  SpO2: 98% (11-25-17 @ 11:36) (92% - 99%)  Wt(kg): --  General: Comfortable in NAD  HEENT: MMM, sclera anicteric  Resp: CTA bilaterally  CVS: soft nl s1s2, rrr, no s3/JVD  Abd: soft, NT, ND, BS+  Ext: No c/c/e  Neuro A&O x3  Psych: Normal affect    I&O's Summary    24 Nov 2017 07:01  -  25 Nov 2017 07:00  --------------------------------------------------------  IN: 386 mL / OUT: 2750 mL / NET: -2364 mL    25 Nov 2017 07:01  -  25 Nov 2017 13:07  --------------------------------------------------------  IN: 252 mL / OUT: 400 mL / NET: -148 mL          Labs:   25 Nov 2017 04:46    144    |  107    |  37.0   ----------------------------<  177    4.0     |  25.0   |  1.26     Ca    8.4        25 Nov 2017 04:46  Phos  3.6       25 Nov 2017 04:46  Mg     1.7       25 Nov 2017 04:46    TPro  7.5    /  Alb  3.9    /  TBili  0.2    /  DBili  x      /  AST  20     /  ALT  21     /  AlkPhos  169    24 Nov 2017 02:00                          8.6    4.9   )-----------( 132      ( 25 Nov 2017 04:46 )             27.4     PT/INR - ( 24 Nov 2017 02:00 )   PT: 11.1 sec;   INR: 1.01 ratio         PTT - ( 25 Nov 2017 11:11 )  PTT:177.8 sec  CARDIAC MARKERS ( 25 Nov 2017 04:46 )  x     / 0.10 ng/mL / x     / x     / x      CARDIAC MARKERS ( 24 Nov 2017 17:25 )  x     / 0.13 ng/mL / x     / x     / x      CARDIAC MARKERS ( 24 Nov 2017 11:32 )  x     / 0.09 ng/mL / x     / x     / x      CARDIAC MARKERS ( 24 Nov 2017 02:00 )  x     / <0.01 ng/mL / 112 U/L / x     / x            Assessment:  61y/o Female with PMHx HTN, HLD, DM, CAD (recent cardiac cath in July 2017 showing 95% stenosis of LAD and 100% stenosis of RCA/Circ, good collateral circulation, not a candidate for CABG), leukemia (in remission), systolic CHF (EF: 25-30%), asthma, hypothyroid, ANIKA, presents to ED with SOB and acute onset of substernal chest pain.  -ECG unchanged from prior, trops borderline elevated consistent with mild demand ischemia  -LAD territory on echo preserved  -Potential for revascularization of the complex LAD stenosis is limited.  -She is scheduled for a high risk procedure at [place] on Tues 11/29    Plan:  1. Increase Ranexa to 1000mg bid  2. continue nitrates  3. Heparin gtt for total of 48 hours  4. If remains stable, dc home after weekend with plan for PCI at [place] Tuesday

## 2017-11-25 NOTE — PROGRESS NOTE ADULT - SUBJECTIVE AND OBJECTIVE BOX
CC: Chest pain    INTERVAL HPI/OVERNIGHT EVENTS: Patient seen and examined,       Vital Signs Last 24 Hrs  T(C): 36.7 (25 Nov 2017 07:00), Max: 36.8 (24 Nov 2017 12:00)  T(F): 98.1 (25 Nov 2017 07:00), Max: 98.2 (24 Nov 2017 12:00)  HR: 75 (25 Nov 2017 10:00) (67 - 87)  BP: 165/71 (25 Nov 2017 09:00) (88/55 - 165/71)  BP(mean): 102 (25 Nov 2017 09:00) (62 - 102)  RR: 18 (25 Nov 2017 10:00) (12 - 45)  SpO2: 95% (25 Nov 2017 10:00) (92% - 99%)    PHYSICAL EXAM:    GENERAL: NAD, well-groomed, well-developed  HEAD:  Atraumatic, Normocephalic  EYES: EOMI, PERRLA, conjunctiva and sclera clear  CHEST/LUNG: Clear to auscultation bilaterally; No rales, rhonchi, wheezing, or rubs  HEART: Regular rate and rhythm; No murmurs, rubs, or gallops  ABDOMEN: Soft, Nontender, Nondistended; Bowel sounds present  EXTREMITIES:  2+ Peripheral Pulses, No clubbing, cyanosis, or edema        MEDICATIONS  (STANDING):  aspirin  chewable 81 milliGRAM(s) Oral daily  atorvastatin 80 milliGRAM(s) Oral at bedtime  carvedilol 6.25 milliGRAM(s) Oral every 12 hours  clopidogrel Tablet 75 milliGRAM(s) Oral daily  enalapril 2.5 milliGRAM(s) Oral daily  ferrous    sulfate 325 milliGRAM(s) Oral daily  furosemide    Tablet 40 milliGRAM(s) Oral every 12 hours  heparin  Infusion.  Unit(s)/Hr (22 mL/Hr) IV Continuous <Continuous>  hydrALAZINE 10 milliGRAM(s) Oral every 8 hours  insulin glargine Injectable (LANTUS) 50 Unit(s) SubCutaneous every morning  insulin lispro (HumaLOG) corrective regimen sliding scale   SubCutaneous three times a day before meals  isosorbide   mononitrate ER Tablet (IMDUR) 60 milliGRAM(s) Oral daily  levothyroxine 112 MICROGram(s) Oral daily  nitroglycerin    2% Ointment 2 Inch(s) Transdermal every 6 hours  prednisoLONE  forte 1% Suspension 1 Drop(s) Left EYE four times a day  pregabalin 150 milliGRAM(s) Oral daily  pregabalin 300 milliGRAM(s) Oral at bedtime  ranolazine 500 milliGRAM(s) Oral two times a day  tamsulosin 0.4 milliGRAM(s) Oral at bedtime    MEDICATIONS  (PRN):  ALBUTerol    90 MICROgram(s) HFA Inhaler 2 Puff(s) Inhalation every 6 hours PRN Shortness of Breath and/or Wheezing  artificial  tears Solution 1 Drop(s) Both EYES daily PRN Dry Eyes  guaiFENesin    Syrup 100 milliGRAM(s) Oral every 6 hours PRN Cough  heparin  Injectable 56959 Unit(s) IV Push every 6 hours PRN For aPTT less than 40  heparin  Injectable 5000 Unit(s) IV Push every 6 hours PRN For aPTT between 40 - 57      Allergies    Cipro (Unknown)  Demerol HCl (Hives)  iodine (Unknown)  iodine containing compounds (Anaphylaxis)  loperamide (Unknown)  phenylpiperidine derivatives (Unknown)  tramadol (Unknown)    Intolerances          LABS:                          8.6    4.9   )-----------( 132      ( 25 Nov 2017 04:46 )             27.4     11-25    144  |  107  |  37.0<H>  ----------------------------<  177<H>  4.0   |  25.0  |  1.26    Ca    8.4<L>      25 Nov 2017 04:46  Phos  3.6     11-25  Mg     1.7     11-25    TPro  7.5  /  Alb  3.9  /  TBili  0.2<L>  /  DBili  x   /  AST  20  /  ALT  21  /  AlkPhos  169<H>  11-24    PT/INR - ( 24 Nov 2017 02:00 )   PT: 11.1 sec;   INR: 1.01 ratio         PTT - ( 25 Nov 2017 07:34 )  PTT:30.0 sec      RADIOLOGY & ADDITIONAL TESTS: CC: Chest pain    INTERVAL HPI/OVERNIGHT EVENTS: Patient seen and examined, still has substernal chest tightness and shortness of breath at rest which has improved since admission. Feels tired this morning and lightheaded. SBP was 149 when checked.       Vital Signs Last 24 Hrs  T(C): 36.7 (25 Nov 2017 07:00), Max: 36.8 (24 Nov 2017 12:00)  T(F): 98.1 (25 Nov 2017 07:00), Max: 98.2 (24 Nov 2017 12:00)  HR: 75 (25 Nov 2017 10:00) (67 - 87)  BP: 165/71 (25 Nov 2017 09:00) (88/55 - 165/71)  BP(mean): 102 (25 Nov 2017 09:00) (62 - 102)  RR: 18 (25 Nov 2017 10:00) (12 - 45)  SpO2: 95% (25 Nov 2017 10:00) (92% - 99%)    PHYSICAL EXAM:    GENERAL: NAD, well-groomed, well-developed  HEAD:  Atraumatic, Normocephalic  EYES: EOMI, PERRLA, conjunctiva and sclera clear  CHEST/LUNG: Clear to auscultation bilaterally; No rales, rhonchi, wheezing, or rubs  HEART: Regular rate and rhythm; No murmurs, rubs, or gallops  ABDOMEN: Soft, Nontender, Nondistended; Bowel sounds present  EXTREMITIES:  2+ Peripheral Pulses, No clubbing, cyanosis, or edema        MEDICATIONS  (STANDING):  aspirin  chewable 81 milliGRAM(s) Oral daily  atorvastatin 80 milliGRAM(s) Oral at bedtime  carvedilol 6.25 milliGRAM(s) Oral every 12 hours  clopidogrel Tablet 75 milliGRAM(s) Oral daily  enalapril 2.5 milliGRAM(s) Oral daily  ferrous    sulfate 325 milliGRAM(s) Oral daily  furosemide    Tablet 40 milliGRAM(s) Oral every 12 hours  heparin  Infusion.  Unit(s)/Hr (22 mL/Hr) IV Continuous <Continuous>  hydrALAZINE 10 milliGRAM(s) Oral every 8 hours  insulin glargine Injectable (LANTUS) 50 Unit(s) SubCutaneous every morning  insulin lispro (HumaLOG) corrective regimen sliding scale   SubCutaneous three times a day before meals  isosorbide   mononitrate ER Tablet (IMDUR) 60 milliGRAM(s) Oral daily  levothyroxine 112 MICROGram(s) Oral daily  nitroglycerin    2% Ointment 2 Inch(s) Transdermal every 6 hours  prednisoLONE  forte 1% Suspension 1 Drop(s) Left EYE four times a day  pregabalin 150 milliGRAM(s) Oral daily  pregabalin 300 milliGRAM(s) Oral at bedtime  ranolazine 500 milliGRAM(s) Oral two times a day  tamsulosin 0.4 milliGRAM(s) Oral at bedtime    MEDICATIONS  (PRN):  ALBUTerol    90 MICROgram(s) HFA Inhaler 2 Puff(s) Inhalation every 6 hours PRN Shortness of Breath and/or Wheezing  artificial  tears Solution 1 Drop(s) Both EYES daily PRN Dry Eyes  guaiFENesin    Syrup 100 milliGRAM(s) Oral every 6 hours PRN Cough  heparin  Injectable 88013 Unit(s) IV Push every 6 hours PRN For aPTT less than 40  heparin  Injectable 5000 Unit(s) IV Push every 6 hours PRN For aPTT between 40 - 57      Allergies    Cipro (Unknown)  Demerol HCl (Hives)  iodine (Unknown)  iodine containing compounds (Anaphylaxis)  loperamide (Unknown)  phenylpiperidine derivatives (Unknown)  tramadol (Unknown)    Intolerances          LABS:                          8.6    4.9   )-----------( 132      ( 25 Nov 2017 04:46 )             27.4     11-25    144  |  107  |  37.0<H>  ----------------------------<  177<H>  4.0   |  25.0  |  1.26    Ca    8.4<L>      25 Nov 2017 04:46  Phos  3.6     11-25  Mg     1.7     11-25    TPro  7.5  /  Alb  3.9  /  TBili  0.2<L>  /  DBili  x   /  AST  20  /  ALT  21  /  AlkPhos  169<H>  11-24    PT/INR - ( 24 Nov 2017 02:00 )   PT: 11.1 sec;   INR: 1.01 ratio         PTT - ( 25 Nov 2017 07:34 )  PTT:30.0 sec      RADIOLOGY & ADDITIONAL TESTS:

## 2017-11-26 LAB
ANION GAP SERPL CALC-SCNC: 11 MMOL/L — SIGNIFICANT CHANGE UP (ref 5–17)
APTT BLD: 37.4 SEC — SIGNIFICANT CHANGE UP (ref 27.5–37.4)
APTT BLD: 41.7 SEC — HIGH (ref 27.5–37.4)
APTT BLD: >200 SEC — CRITICAL HIGH (ref 27.5–37.4)
BUN SERPL-MCNC: 30 MG/DL — HIGH (ref 8–20)
CALCIUM SERPL-MCNC: 9.1 MG/DL — SIGNIFICANT CHANGE UP (ref 8.6–10.2)
CHLORIDE SERPL-SCNC: 105 MMOL/L — SIGNIFICANT CHANGE UP (ref 98–107)
CO2 SERPL-SCNC: 27 MMOL/L — SIGNIFICANT CHANGE UP (ref 22–29)
CREAT SERPL-MCNC: 1.22 MG/DL — SIGNIFICANT CHANGE UP (ref 0.5–1.3)
GLUCOSE BLDC GLUCOMTR-MCNC: 159 MG/DL — HIGH (ref 70–99)
GLUCOSE BLDC GLUCOMTR-MCNC: 197 MG/DL — HIGH (ref 70–99)
GLUCOSE BLDC GLUCOMTR-MCNC: 205 MG/DL — HIGH (ref 70–99)
GLUCOSE SERPL-MCNC: 178 MG/DL — HIGH (ref 70–115)
HCT VFR BLD CALC: 28.3 % — LOW (ref 37–47)
HGB BLD-MCNC: 9 G/DL — LOW (ref 12–16)
MCHC RBC-ENTMCNC: 29.9 PG — SIGNIFICANT CHANGE UP (ref 27–31)
MCHC RBC-ENTMCNC: 31.8 G/DL — LOW (ref 32–36)
MCV RBC AUTO: 94 FL — SIGNIFICANT CHANGE UP (ref 81–99)
PLATELET # BLD AUTO: 136 K/UL — LOW (ref 150–400)
POTASSIUM SERPL-MCNC: 3.9 MMOL/L — SIGNIFICANT CHANGE UP (ref 3.5–5.3)
POTASSIUM SERPL-SCNC: 3.9 MMOL/L — SIGNIFICANT CHANGE UP (ref 3.5–5.3)
RBC # BLD: 3.01 M/UL — LOW (ref 4.4–5.2)
RBC # FLD: 13.3 % — SIGNIFICANT CHANGE UP (ref 11–15.6)
SODIUM SERPL-SCNC: 143 MMOL/L — SIGNIFICANT CHANGE UP (ref 135–145)
WBC # BLD: 4 K/UL — LOW (ref 4.8–10.8)
WBC # FLD AUTO: 4 K/UL — LOW (ref 4.8–10.8)

## 2017-11-26 PROCEDURE — 99233 SBSQ HOSP IP/OBS HIGH 50: CPT

## 2017-11-26 RX ORDER — ISOSORBIDE MONONITRATE 60 MG/1
120 TABLET, EXTENDED RELEASE ORAL DAILY
Qty: 0 | Refills: 0 | Status: DISCONTINUED | OUTPATIENT
Start: 2017-11-26 | End: 2017-11-26

## 2017-11-26 RX ORDER — ONDANSETRON 8 MG/1
4 TABLET, FILM COATED ORAL EVERY 6 HOURS
Qty: 0 | Refills: 0 | Status: DISCONTINUED | OUTPATIENT
Start: 2017-11-26 | End: 2017-11-28

## 2017-11-26 RX ORDER — RANOLAZINE 500 MG/1
1000 TABLET, FILM COATED, EXTENDED RELEASE ORAL
Qty: 0 | Refills: 0 | Status: DISCONTINUED | OUTPATIENT
Start: 2017-11-26 | End: 2017-11-28

## 2017-11-26 RX ORDER — ISOSORBIDE MONONITRATE 60 MG/1
120 TABLET, EXTENDED RELEASE ORAL DAILY
Qty: 0 | Refills: 0 | Status: DISCONTINUED | OUTPATIENT
Start: 2017-11-27 | End: 2017-11-28

## 2017-11-26 RX ADMIN — Medication 150 MILLIGRAM(S): at 12:05

## 2017-11-26 RX ADMIN — HEPARIN SODIUM 1500 UNIT(S)/HR: 5000 INJECTION INTRAVENOUS; SUBCUTANEOUS at 06:00

## 2017-11-26 RX ADMIN — Medication 2: at 07:57

## 2017-11-26 RX ADMIN — Medication 300 MILLIGRAM(S): at 21:43

## 2017-11-26 RX ADMIN — Medication 2.5 MILLIGRAM(S): at 05:27

## 2017-11-26 RX ADMIN — Medication 40 MILLIGRAM(S): at 17:32

## 2017-11-26 RX ADMIN — CLOPIDOGREL BISULFATE 75 MILLIGRAM(S): 75 TABLET, FILM COATED ORAL at 11:43

## 2017-11-26 RX ADMIN — Medication 1 DROP(S): at 17:32

## 2017-11-26 RX ADMIN — CARVEDILOL PHOSPHATE 6.25 MILLIGRAM(S): 80 CAPSULE, EXTENDED RELEASE ORAL at 17:32

## 2017-11-26 RX ADMIN — TAMSULOSIN HYDROCHLORIDE 0.4 MILLIGRAM(S): 0.4 CAPSULE ORAL at 21:43

## 2017-11-26 RX ADMIN — HEPARIN SODIUM 0 UNIT(S)/HR: 5000 INJECTION INTRAVENOUS; SUBCUTANEOUS at 22:45

## 2017-11-26 RX ADMIN — Medication 1 DROP(S): at 05:27

## 2017-11-26 RX ADMIN — Medication 25 MILLIGRAM(S): at 05:27

## 2017-11-26 RX ADMIN — INSULIN GLARGINE 50 UNIT(S): 100 INJECTION, SOLUTION SUBCUTANEOUS at 07:57

## 2017-11-26 RX ADMIN — Medication 25 MILLIGRAM(S): at 21:43

## 2017-11-26 RX ADMIN — Medication 1 DROP(S): at 12:06

## 2017-11-26 RX ADMIN — Medication 2 INCH(S): at 13:36

## 2017-11-26 RX ADMIN — Medication 4: at 11:44

## 2017-11-26 RX ADMIN — Medication 81 MILLIGRAM(S): at 11:43

## 2017-11-26 RX ADMIN — Medication 25 MILLIGRAM(S): at 13:37

## 2017-11-26 RX ADMIN — PANTOPRAZOLE SODIUM 40 MILLIGRAM(S): 20 TABLET, DELAYED RELEASE ORAL at 07:57

## 2017-11-26 RX ADMIN — ONDANSETRON 4 MILLIGRAM(S): 8 TABLET, FILM COATED ORAL at 11:43

## 2017-11-26 RX ADMIN — Medication 112 MICROGRAM(S): at 05:27

## 2017-11-26 RX ADMIN — Medication 100 MILLIGRAM(S): at 21:20

## 2017-11-26 RX ADMIN — Medication 325 MILLIGRAM(S): at 11:43

## 2017-11-26 RX ADMIN — Medication 2: at 17:33

## 2017-11-26 RX ADMIN — Medication 1 DROP(S): at 12:00

## 2017-11-26 RX ADMIN — Medication 2 INCH(S): at 12:10

## 2017-11-26 RX ADMIN — RANOLAZINE 1000 MILLIGRAM(S): 500 TABLET, FILM COATED, EXTENDED RELEASE ORAL at 17:32

## 2017-11-26 RX ADMIN — HEPARIN SODIUM 1800 UNIT(S)/HR: 5000 INJECTION INTRAVENOUS; SUBCUTANEOUS at 13:00

## 2017-11-26 RX ADMIN — Medication 30 MILLILITER(S): at 13:37

## 2017-11-26 RX ADMIN — Medication 1 DROP(S): at 21:44

## 2017-11-26 RX ADMIN — RANOLAZINE 500 MILLIGRAM(S): 500 TABLET, FILM COATED, EXTENDED RELEASE ORAL at 10:42

## 2017-11-26 RX ADMIN — ATORVASTATIN CALCIUM 80 MILLIGRAM(S): 80 TABLET, FILM COATED ORAL at 21:43

## 2017-11-26 RX ADMIN — ISOSORBIDE MONONITRATE 60 MILLIGRAM(S): 60 TABLET, EXTENDED RELEASE ORAL at 11:43

## 2017-11-26 RX ADMIN — Medication 40 MILLIGRAM(S): at 05:27

## 2017-11-26 RX ADMIN — CARVEDILOL PHOSPHATE 6.25 MILLIGRAM(S): 80 CAPSULE, EXTENDED RELEASE ORAL at 05:27

## 2017-11-26 NOTE — DIETITIAN INITIAL EVALUATION ADULT. - OTHER INFO
Pt reports good appetite but unhappy with food provided. Basic nutrition education provided and assisted with food preferences/menu choices.

## 2017-11-26 NOTE — PROGRESS NOTE ADULT - SUBJECTIVE AND OBJECTIVE BOX
GEMINI ROONEY  [id number]        Chief Complaint:f/u unstable angina      Subjective: no anginal pain, mild heartburn      24 hour Tele: SR, no events        ALBUTerol    90 MICROgram(s) HFA Inhaler 2 Puff(s) Inhalation every 6 hours PRN  aluminum hydroxide/magnesium hydroxide/simethicone Suspension 30 milliLiter(s) Oral every 4 hours PRN  artificial  tears Solution 1 Drop(s) Both EYES daily PRN  aspirin  chewable 81 milliGRAM(s) Oral daily  atorvastatin 80 milliGRAM(s) Oral at bedtime  carvedilol 6.25 milliGRAM(s) Oral every 12 hours  clopidogrel Tablet 75 milliGRAM(s) Oral daily  enalapril 2.5 milliGRAM(s) Oral daily  ferrous    sulfate 325 milliGRAM(s) Oral daily  furosemide    Tablet 40 milliGRAM(s) Oral every 12 hours  guaiFENesin    Syrup 100 milliGRAM(s) Oral every 6 hours PRN  heparin  Infusion.  Unit(s)/Hr IV Continuous <Continuous>  hydrALAZINE 25 milliGRAM(s) Oral every 8 hours  insulin glargine Injectable (LANTUS) 50 Unit(s) SubCutaneous every morning  insulin lispro (HumaLOG) corrective regimen sliding scale   SubCutaneous three times a day before meals  isosorbide   mononitrate ER Tablet (IMDUR) 60 milliGRAM(s) Oral daily  levothyroxine 112 MICROGram(s) Oral daily  nitroglycerin    2% Ointment 2 Inch(s) Transdermal every 6 hours  ondansetron Injectable 4 milliGRAM(s) IV Push every 6 hours PRN  pantoprazole    Tablet 40 milliGRAM(s) Oral before breakfast  prednisoLONE  forte 1% Suspension 1 Drop(s) Left EYE four times a day  pregabalin 150 milliGRAM(s) Oral daily  pregabalin 300 milliGRAM(s) Oral at bedtime  ranolazine 500 milliGRAM(s) Oral two times a day  tamsulosin 0.4 milliGRAM(s) Oral at bedtime          Physical Exam:  T(C): 36.7 (11-25-17 @ 16:50), Max: 36.7 (11-25-17 @ 16:50)  HR: 80 (11-26-17 @ 11:17) (75 - 90)  BP: 125/62 (11-26-17 @ 11:17) (101/52 - 155/62)  RR: 14 (11-26-17 @ 11:17) (13 - 34)  SpO2: 100% (11-26-17 @ 11:17) (94% - 100%)  Wt(kg): --  General: Comfortable in NAD  HEENT: MMM, sclera anicteric  Resp: CTA bilaterally  CVS: nl s1s2, rrr, no s3/JVD  Abd: soft, NT, ND, BS+  Ext: No c/c/e  Neuro A&O x3  Psych: Normal affect    I&O's Summary    25 Nov 2017 07:01  -  26 Nov 2017 07:00  --------------------------------------------------------  IN: 438 mL / OUT: 1850 mL / NET: -1412 mL    26 Nov 2017 07:01  -  26 Nov 2017 12:52  --------------------------------------------------------  IN: 515 mL / OUT: 750 mL / NET: -235 mL          Labs:   26 Nov 2017 05:28    143    |  105    |  30.0   ----------------------------<  178    3.9     |  27.0   |  1.22     Ca    9.1        26 Nov 2017 05:28  Phos  3.6       25 Nov 2017 04:46  Mg     1.7       25 Nov 2017 04:46                            9.0    4.0   )-----------( 136      ( 26 Nov 2017 05:28 )             28.3     PTT - ( 26 Nov 2017 05:27 )  PTT:41.7 sec  CARDIAC MARKERS ( 25 Nov 2017 12:47 )  x     / 0.08 ng/mL / x     / x     / x      CARDIAC MARKERS ( 25 Nov 2017 04:46 )  x     / 0.10 ng/mL / x     / x     / x      CARDIAC MARKERS ( 24 Nov 2017 17:25 )  x     / 0.13 ng/mL / x     / x     / x          Assessment:  61y/o Female with PMHx HTN, HLD, DM, CAD (recent cardiac cath in July 2017 showing 95% stenosis of LAD and 100% stenosis of RCA/Circ, good collateral circulation, not a candidate for CABG), leukemia (in remission), systolic CHF (EF: 25-30%), asthma, hypothyroid, ANIKA, presents to ED with SOB and acute onset of substernal chest pain.  -ECG unchanged from prior, trops borderline elevated consistent with mild demand ischemia  -LAD territory on echo preserved  -Potential for revascularization of the complex LAD stenosis is limited.  -She is scheduled for a high risk procedure at [facility] on Tues 11/29    Plan:  1. DC heparin after total of 48 hours  2. INcrease ranexa to 1000mg bid  3. DC nitropaste and increase isosorbide to 90mg daily  4. Would monitor until at least tuesday morning, than can dc and patient to have PCI at [place]

## 2017-11-26 NOTE — PROVIDER CONTACT NOTE (CRITICAL VALUE NOTIFICATION) - ACTION/TREATMENT ORDERED:
Following [employer]
Follow ACS protocol, hold x 60 minutes and continue at 300units/hr less than last value

## 2017-11-26 NOTE — PHYSICAL THERAPY INITIAL EVALUATION ADULT - ADDITIONAL COMMENTS
Pt. lives in a private home with a ramp to enter and ambulates with cane for home ambulation.  Pt. uses a wheelchair for longer distances.

## 2017-11-26 NOTE — PROGRESS NOTE ADULT - ASSESSMENT
Patient is a 62 year old female with PMH of HTN, HLD, DM, Retinopathy (legally blind), Psoriasis, CAD (recent cardiac cath in July 2017 showing 95% stenosis of LAD and 100% stenosis of RCA/Circ, good collateral circulation, not a candidate for CABG), leukemia (in remission), systolic CHF (EF: 25-30%), asthma, hypothyroid, and ANIKA who presented to the ED with complaints of SOB and acute onset of substernal chest pain with radiation to back, left arm, and jaw. s/p CODE STEMI and admitted for unstable angina management       Assessment/Plan:    1.  Unstable angina pectoris due to coronary arteriosclerosis- Failed intervention in July has been medically managed. Scheduled for high risk LAD PCI on 11/29/17 at [facility].     IV heparin for a total of 48 hours  On Aspirin/plavix/bb/statin/acei  On hydralazine/imdur and ranexa  telemetry monitoring.   Trend cardiac enzymes.    Plan for discharge in Am if stable to follow up at [facility] for planned PCI     2.  Essential hypertension .Continue Coreg and enalapril with holding parameters. Low sodium diet. Increased hydralazine to 25mg PO TID. MOnitor bp       3.Pure hypercholesterolemia.  Recommendation: Continue Lipitor.    4. Acute on chronic systolic CHF- Stable, Lasix bid, on ACEi   Strict I/Os, daily weights. TTE reviewed.    5. MEGHA: Resolved. Monitor bmp Patient is a 62 year old female with PMH of HTN, HLD, DM, Retinopathy (legally blind), Psoriasis, CAD (recent cardiac cath in July 2017 showing 95% stenosis of LAD and 100% stenosis of RCA/Circ, good collateral circulation, not a candidate for CABG), leukemia (in remission), systolic CHF (EF: 25-30%), asthma, hypothyroid, and ANIKA who presented to the ED with complaints of SOB and acute onset of substernal chest pain with radiation to back, left arm, and jaw. s/p CODE STEMI and admitted for unstable angina management       Assessment/Plan:    1.  Unstable angina pectoris due to coronary arteriosclerosis- Failed intervention in July has been medically managed. Scheduled for high risk LAD PCI on 11/29/17 at [place] [facility].     IV heparin for a total of 48 hours  On Aspirin/plavix/bb/statin/acei  On hydralazine/imdur and ranexa  telemetry monitoring.   Trend cardiac enzymes.    Plan for discharge on Tuesday to follow up at [facility] for planned PCI     2.  Essential hypertension .Continue Coreg and enalapril with holding parameters. Low sodium diet. Increased hydralazine to 25mg PO TID. MOnitor bp       3.Pure hypercholesterolemia.  Recommendation: Continue Lipitor.    4. Acute on chronic systolic CHF- Stable, Lasix bid, on ACEi   Strict I/Os, daily weights. TTE reviewed.    5. MEGHA: Resolved. Monitor bmp     6. GERD: Po protonix and mylanta as needed

## 2017-11-26 NOTE — PROGRESS NOTE ADULT - SUBJECTIVE AND OBJECTIVE BOX
CC: chest pain    INTERVAL HPI/OVERNIGHT EVENTS: Patient seen and examined,       Vital Signs Last 24 Hrs  T(C): 36.7 (25 Nov 2017 16:50), Max: 36.8 (25 Nov 2017 12:00)  T(F): 98.1 (25 Nov 2017 16:50), Max: 98.2 (25 Nov 2017 12:00)  HR: 83 (26 Nov 2017 09:06) (74 - 90)  BP: 136/66 (26 Nov 2017 09:06) (101/52 - 155/62)  BP(mean): 95 (26 Nov 2017 09:06) (74 - 95)  RR: 18 (26 Nov 2017 09:06) (13 - 34)  SpO2: 96% (26 Nov 2017 09:06) (94% - 99%)    PHYSICAL EXAM:    GENERAL: NAD, well-groomed, well-developed  NECK: Supple, No JVD  CHEST/LUNG: Clear to auscultation bilaterally; No rales, rhonchi, wheezing, or rubs  HEART: Regular rate and rhythm; No murmurs, rubs, or gallops  ABDOMEN: Soft, Nontender, Nondistended; Bowel sounds present  EXTREMITIES:  2+ Peripheral Pulses, No clubbing, cyanosis, or edema        MEDICATIONS  (STANDING):  aspirin  chewable 81 milliGRAM(s) Oral daily  atorvastatin 80 milliGRAM(s) Oral at bedtime  carvedilol 6.25 milliGRAM(s) Oral every 12 hours  clopidogrel Tablet 75 milliGRAM(s) Oral daily  enalapril 2.5 milliGRAM(s) Oral daily  ferrous    sulfate 325 milliGRAM(s) Oral daily  furosemide    Tablet 40 milliGRAM(s) Oral every 12 hours  heparin  Infusion.  Unit(s)/Hr (10 mL/Hr) IV Continuous <Continuous>  hydrALAZINE 25 milliGRAM(s) Oral every 8 hours  insulin glargine Injectable (LANTUS) 50 Unit(s) SubCutaneous every morning  insulin lispro (HumaLOG) corrective regimen sliding scale   SubCutaneous three times a day before meals  isosorbide   mononitrate ER Tablet (IMDUR) 60 milliGRAM(s) Oral daily  levothyroxine 112 MICROGram(s) Oral daily  nitroglycerin    2% Ointment 2 Inch(s) Transdermal every 6 hours  pantoprazole    Tablet 40 milliGRAM(s) Oral before breakfast  prednisoLONE  forte 1% Suspension 1 Drop(s) Left EYE four times a day  pregabalin 150 milliGRAM(s) Oral daily  pregabalin 300 milliGRAM(s) Oral at bedtime  ranolazine 500 milliGRAM(s) Oral two times a day  tamsulosin 0.4 milliGRAM(s) Oral at bedtime    MEDICATIONS  (PRN):  ALBUTerol    90 MICROgram(s) HFA Inhaler 2 Puff(s) Inhalation every 6 hours PRN Shortness of Breath and/or Wheezing  aluminum hydroxide/magnesium hydroxide/simethicone Suspension 30 milliLiter(s) Oral every 4 hours PRN Dyspepsia  artificial  tears Solution 1 Drop(s) Both EYES daily PRN Dry Eyes  guaiFENesin    Syrup 100 milliGRAM(s) Oral every 6 hours PRN Cough      Allergies    Cipro (Unknown)  Demerol HCl (Hives)  iodine (Unknown)  iodine containing compounds (Anaphylaxis)  loperamide (Unknown)  phenylpiperidine derivatives (Unknown)  tramadol (Unknown)    Intolerances          LABS:                          9.0    4.0   )-----------( 136      ( 26 Nov 2017 05:28 )             28.3     11-26    143  |  105  |  30.0<H>  ----------------------------<  178<H>  3.9   |  27.0  |  1.22    Ca    9.1      26 Nov 2017 05:28  Phos  3.6     11-25  Mg     1.7     11-25      PTT - ( 26 Nov 2017 05:27 )  PTT:41.7 sec      RADIOLOGY & ADDITIONAL TESTS: CC: chest pain    INTERVAL HPI/OVERNIGHT EVENTS: Patient seen and examined, still having central chest pain radiating to her back but it has subsided since admission.  Complaints of reflux with some nausea this morning.       Vital Signs Last 24 Hrs  T(C): 36.7 (25 Nov 2017 16:50), Max: 36.8 (25 Nov 2017 12:00)  T(F): 98.1 (25 Nov 2017 16:50), Max: 98.2 (25 Nov 2017 12:00)  HR: 83 (26 Nov 2017 09:06) (74 - 90)  BP: 136/66 (26 Nov 2017 09:06) (101/52 - 155/62)  BP(mean): 95 (26 Nov 2017 09:06) (74 - 95)  RR: 18 (26 Nov 2017 09:06) (13 - 34)  SpO2: 96% (26 Nov 2017 09:06) (94% - 99%)    PHYSICAL EXAM:    GENERAL: NAD, well-groomed, well-developed  NECK: Supple, No JVD  CHEST/LUNG: Clear to auscultation bilaterally; No rales, rhonchi, wheezing, or rubs  HEART: Regular rate and rhythm; No murmurs, rubs, or gallops  ABDOMEN: Soft, Nontender, Nondistended; Bowel sounds present  EXTREMITIES:  2+ Peripheral Pulses, No clubbing, cyanosis, or edema        MEDICATIONS  (STANDING):  aspirin  chewable 81 milliGRAM(s) Oral daily  atorvastatin 80 milliGRAM(s) Oral at bedtime  carvedilol 6.25 milliGRAM(s) Oral every 12 hours  clopidogrel Tablet 75 milliGRAM(s) Oral daily  enalapril 2.5 milliGRAM(s) Oral daily  ferrous    sulfate 325 milliGRAM(s) Oral daily  furosemide    Tablet 40 milliGRAM(s) Oral every 12 hours  heparin  Infusion.  Unit(s)/Hr (10 mL/Hr) IV Continuous <Continuous>  hydrALAZINE 25 milliGRAM(s) Oral every 8 hours  insulin glargine Injectable (LANTUS) 50 Unit(s) SubCutaneous every morning  insulin lispro (HumaLOG) corrective regimen sliding scale   SubCutaneous three times a day before meals  isosorbide   mononitrate ER Tablet (IMDUR) 60 milliGRAM(s) Oral daily  levothyroxine 112 MICROGram(s) Oral daily  nitroglycerin    2% Ointment 2 Inch(s) Transdermal every 6 hours  pantoprazole    Tablet 40 milliGRAM(s) Oral before breakfast  prednisoLONE  forte 1% Suspension 1 Drop(s) Left EYE four times a day  pregabalin 150 milliGRAM(s) Oral daily  pregabalin 300 milliGRAM(s) Oral at bedtime  ranolazine 500 milliGRAM(s) Oral two times a day  tamsulosin 0.4 milliGRAM(s) Oral at bedtime    MEDICATIONS  (PRN):  ALBUTerol    90 MICROgram(s) HFA Inhaler 2 Puff(s) Inhalation every 6 hours PRN Shortness of Breath and/or Wheezing  aluminum hydroxide/magnesium hydroxide/simethicone Suspension 30 milliLiter(s) Oral every 4 hours PRN Dyspepsia  artificial  tears Solution 1 Drop(s) Both EYES daily PRN Dry Eyes  guaiFENesin    Syrup 100 milliGRAM(s) Oral every 6 hours PRN Cough      Allergies    Cipro (Unknown)  Demerol HCl (Hives)  iodine (Unknown)  iodine containing compounds (Anaphylaxis)  loperamide (Unknown)  phenylpiperidine derivatives (Unknown)  tramadol (Unknown)    Intolerances          LABS:                          9.0    4.0   )-----------( 136      ( 26 Nov 2017 05:28 )             28.3     11-26    143  |  105  |  30.0<H>  ----------------------------<  178<H>  3.9   |  27.0  |  1.22    Ca    9.1      26 Nov 2017 05:28  Phos  3.6     11-25  Mg     1.7     11-25      PTT - ( 26 Nov 2017 05:27 )  PTT:41.7 sec      RADIOLOGY & ADDITIONAL TESTS:

## 2017-11-26 NOTE — PROVIDER CONTACT NOTE (OTHER) - SITUATION
Patient on heparin drip, unable to get lab results, lab stating machine is not coming up with a number

## 2017-11-27 ENCOUNTER — TRANSCRIPTION ENCOUNTER (OUTPATIENT)
Age: 62
End: 2017-11-27

## 2017-11-27 LAB
ANION GAP SERPL CALC-SCNC: 12 MMOL/L — SIGNIFICANT CHANGE UP (ref 5–17)
APTT BLD: >200 SEC — SIGNIFICANT CHANGE UP (ref 27.5–37.4)
BUN SERPL-MCNC: 27 MG/DL — HIGH (ref 8–20)
CALCIUM SERPL-MCNC: 9.3 MG/DL — SIGNIFICANT CHANGE UP (ref 8.6–10.2)
CHLORIDE SERPL-SCNC: 101 MMOL/L — SIGNIFICANT CHANGE UP (ref 98–107)
CO2 SERPL-SCNC: 29 MMOL/L — SIGNIFICANT CHANGE UP (ref 22–29)
CREAT SERPL-MCNC: 1.28 MG/DL — SIGNIFICANT CHANGE UP (ref 0.5–1.3)
GLUCOSE BLDC GLUCOMTR-MCNC: 216 MG/DL — HIGH (ref 70–99)
GLUCOSE BLDC GLUCOMTR-MCNC: 227 MG/DL — HIGH (ref 70–99)
GLUCOSE BLDC GLUCOMTR-MCNC: 237 MG/DL — HIGH (ref 70–99)
GLUCOSE BLDC GLUCOMTR-MCNC: 278 MG/DL — HIGH (ref 70–99)
GLUCOSE SERPL-MCNC: 238 MG/DL — HIGH (ref 70–115)
HCT VFR BLD CALC: 30.7 % — LOW (ref 37–47)
HGB BLD-MCNC: 9.9 G/DL — LOW (ref 12–16)
MCHC RBC-ENTMCNC: 30.6 PG — SIGNIFICANT CHANGE UP (ref 27–31)
MCHC RBC-ENTMCNC: 32.2 G/DL — SIGNIFICANT CHANGE UP (ref 32–36)
MCV RBC AUTO: 94.8 FL — SIGNIFICANT CHANGE UP (ref 81–99)
PLATELET # BLD AUTO: 135 K/UL — LOW (ref 150–400)
POTASSIUM SERPL-MCNC: 4.3 MMOL/L — SIGNIFICANT CHANGE UP (ref 3.5–5.3)
POTASSIUM SERPL-SCNC: 4.3 MMOL/L — SIGNIFICANT CHANGE UP (ref 3.5–5.3)
RBC # BLD: 3.24 M/UL — LOW (ref 4.4–5.2)
RBC # FLD: 13.5 % — SIGNIFICANT CHANGE UP (ref 11–15.6)
SODIUM SERPL-SCNC: 142 MMOL/L — SIGNIFICANT CHANGE UP (ref 135–145)
TROPONIN T SERPL-MCNC: 0.22 NG/ML — HIGH (ref 0–0.06)
WBC # BLD: 4.2 K/UL — LOW (ref 4.8–10.8)
WBC # FLD AUTO: 4.2 K/UL — LOW (ref 4.8–10.8)

## 2017-11-27 PROCEDURE — 93010 ELECTROCARDIOGRAM REPORT: CPT

## 2017-11-27 PROCEDURE — 99233 SBSQ HOSP IP/OBS HIGH 50: CPT

## 2017-11-27 RX ORDER — ALBUTEROL 90 UG/1
0 AEROSOL, METERED ORAL
Qty: 0 | Refills: 0 | COMMUNITY

## 2017-11-27 RX ORDER — OXYCODONE HYDROCHLORIDE 5 MG/1
5 TABLET ORAL
Qty: 0 | Refills: 0 | COMMUNITY

## 2017-11-27 RX ORDER — ISOSORBIDE MONONITRATE 60 MG/1
1 TABLET, EXTENDED RELEASE ORAL
Qty: 30 | Refills: 0 | OUTPATIENT
Start: 2017-11-27 | End: 2017-12-27

## 2017-11-27 RX ORDER — RANOLAZINE 500 MG/1
1 TABLET, FILM COATED, EXTENDED RELEASE ORAL
Qty: 60 | Refills: 0 | OUTPATIENT
Start: 2017-11-27 | End: 2017-12-27

## 2017-11-27 RX ORDER — IPRATROPIUM/ALBUTEROL SULFATE 18-103MCG
3 AEROSOL WITH ADAPTER (GRAM) INHALATION EVERY 6 HOURS
Qty: 0 | Refills: 0 | Status: DISCONTINUED | OUTPATIENT
Start: 2017-11-27 | End: 2017-11-28

## 2017-11-27 RX ORDER — HEPARIN SODIUM 5000 [USP'U]/ML
5000 INJECTION INTRAVENOUS; SUBCUTANEOUS EVERY 12 HOURS
Qty: 0 | Refills: 0 | Status: DISCONTINUED | OUTPATIENT
Start: 2017-11-27 | End: 2017-11-28

## 2017-11-27 RX ORDER — SODIUM CHLORIDE 9 MG/ML
1000 INJECTION INTRAMUSCULAR; INTRAVENOUS; SUBCUTANEOUS ONCE
Qty: 0 | Refills: 0 | Status: COMPLETED | OUTPATIENT
Start: 2017-11-27 | End: 2017-11-27

## 2017-11-27 RX ORDER — PETROLATUM,WHITE
1 JELLY (GRAM) TOPICAL
Qty: 0 | Refills: 0 | Status: DISCONTINUED | OUTPATIENT
Start: 2017-11-27 | End: 2017-11-28

## 2017-11-27 RX ORDER — CLOPIDOGREL BISULFATE 75 MG/1
1 TABLET, FILM COATED ORAL
Qty: 30 | Refills: 0 | OUTPATIENT
Start: 2017-11-27 | End: 2017-12-27

## 2017-11-27 RX ORDER — INSULIN LISPRO 100/ML
2 VIAL (ML) SUBCUTANEOUS ONCE
Qty: 0 | Refills: 0 | Status: COMPLETED | OUTPATIENT
Start: 2017-11-27 | End: 2017-11-27

## 2017-11-27 RX ORDER — FUROSEMIDE 40 MG
40 TABLET ORAL DAILY
Qty: 0 | Refills: 0 | Status: DISCONTINUED | OUTPATIENT
Start: 2017-11-27 | End: 2017-11-28

## 2017-11-27 RX ADMIN — Medication 4: at 17:41

## 2017-11-27 RX ADMIN — Medication 1 DROP(S): at 23:50

## 2017-11-27 RX ADMIN — INSULIN GLARGINE 50 UNIT(S): 100 INJECTION, SOLUTION SUBCUTANEOUS at 09:43

## 2017-11-27 RX ADMIN — RANOLAZINE 1000 MILLIGRAM(S): 500 TABLET, FILM COATED, EXTENDED RELEASE ORAL at 06:47

## 2017-11-27 RX ADMIN — HEPARIN SODIUM 5000 UNIT(S): 5000 INJECTION INTRAVENOUS; SUBCUTANEOUS at 18:37

## 2017-11-27 RX ADMIN — Medication 1 DROP(S): at 12:18

## 2017-11-27 RX ADMIN — Medication 2.5 MILLIGRAM(S): at 06:47

## 2017-11-27 RX ADMIN — Medication 1 DROP(S): at 06:47

## 2017-11-27 RX ADMIN — SODIUM CHLORIDE 2000 MILLILITER(S): 9 INJECTION INTRAMUSCULAR; INTRAVENOUS; SUBCUTANEOUS at 14:00

## 2017-11-27 RX ADMIN — Medication 1 DROP(S): at 06:48

## 2017-11-27 RX ADMIN — CLOPIDOGREL BISULFATE 75 MILLIGRAM(S): 75 TABLET, FILM COATED ORAL at 12:17

## 2017-11-27 RX ADMIN — PANTOPRAZOLE SODIUM 40 MILLIGRAM(S): 20 TABLET, DELAYED RELEASE ORAL at 09:39

## 2017-11-27 RX ADMIN — Medication 100 MILLIGRAM(S): at 20:50

## 2017-11-27 RX ADMIN — Medication 4: at 11:49

## 2017-11-27 RX ADMIN — Medication 1 APPLICATION(S): at 23:51

## 2017-11-27 RX ADMIN — Medication 25 MILLIGRAM(S): at 06:47

## 2017-11-27 RX ADMIN — Medication 1 DROP(S): at 23:52

## 2017-11-27 RX ADMIN — Medication 112 MICROGRAM(S): at 06:47

## 2017-11-27 RX ADMIN — Medication 150 MILLIGRAM(S): at 12:17

## 2017-11-27 RX ADMIN — Medication 1 DROP(S): at 18:37

## 2017-11-27 RX ADMIN — Medication 40 MILLIGRAM(S): at 06:47

## 2017-11-27 RX ADMIN — TAMSULOSIN HYDROCHLORIDE 0.4 MILLIGRAM(S): 0.4 CAPSULE ORAL at 21:34

## 2017-11-27 RX ADMIN — ONDANSETRON 4 MILLIGRAM(S): 8 TABLET, FILM COATED ORAL at 18:42

## 2017-11-27 RX ADMIN — Medication 2 UNIT(S): at 23:50

## 2017-11-27 RX ADMIN — CARVEDILOL PHOSPHATE 6.25 MILLIGRAM(S): 80 CAPSULE, EXTENDED RELEASE ORAL at 06:47

## 2017-11-27 RX ADMIN — Medication 300 MILLIGRAM(S): at 21:33

## 2017-11-27 RX ADMIN — Medication 325 MILLIGRAM(S): at 12:17

## 2017-11-27 RX ADMIN — Medication 81 MILLIGRAM(S): at 12:17

## 2017-11-27 RX ADMIN — ATORVASTATIN CALCIUM 80 MILLIGRAM(S): 80 TABLET, FILM COATED ORAL at 21:33

## 2017-11-27 RX ADMIN — ISOSORBIDE MONONITRATE 120 MILLIGRAM(S): 60 TABLET, EXTENDED RELEASE ORAL at 12:18

## 2017-11-27 NOTE — PROGRESS NOTE ADULT - SUBJECTIVE AND OBJECTIVE BOX
CC: Chest pain    INTERVAL HPI/OVERNIGHT EVENTS: Patient seen and examined,       Vital Signs Last 24 Hrs  T(C): 37.1 (27 Nov 2017 07:00), Max: 37.1 (26 Nov 2017 14:24)  T(F): 98.7 (27 Nov 2017 07:00), Max: 98.8 (26 Nov 2017 14:24)  HR: 83 (27 Nov 2017 08:00) (71 - 88)  BP: 139/61 (27 Nov 2017 08:00) (112/53 - 139/61)  BP(mean): 88 (27 Nov 2017 08:00) (76 - 89)  RR: 16 (27 Nov 2017 08:00) (14 - 63)  SpO2: 99% (27 Nov 2017 08:00) (95% - 100%)    PHYSICAL EXAM:    GENERAL: NAD, well-groomed, well-developed  CHEST/LUNG: Clear to auscultation bilaterally; No rales, rhonchi, wheezing, or rubs  HEART: Regular rate and rhythm; No murmurs, rubs, or gallops  ABDOMEN: Soft, Nontender, Nondistended; Bowel sounds present  EXTREMITIES:  2+ Peripheral Pulses, No clubbing, cyanosis, or edema        MEDICATIONS  (STANDING):  aspirin  chewable 81 milliGRAM(s) Oral daily  atorvastatin 80 milliGRAM(s) Oral at bedtime  carvedilol 6.25 milliGRAM(s) Oral every 12 hours  clopidogrel Tablet 75 milliGRAM(s) Oral daily  enalapril 2.5 milliGRAM(s) Oral daily  ferrous    sulfate 325 milliGRAM(s) Oral daily  furosemide    Tablet 40 milliGRAM(s) Oral every 12 hours  heparin  Injectable 5000 Unit(s) SubCutaneous every 12 hours  hydrALAZINE 25 milliGRAM(s) Oral every 8 hours  insulin glargine Injectable (LANTUS) 50 Unit(s) SubCutaneous every morning  insulin lispro (HumaLOG) corrective regimen sliding scale   SubCutaneous three times a day before meals  isosorbide   mononitrate ER Tablet (IMDUR) 120 milliGRAM(s) Oral daily  levothyroxine 112 MICROGram(s) Oral daily  pantoprazole    Tablet 40 milliGRAM(s) Oral before breakfast  prednisoLONE  forte 1% Suspension 1 Drop(s) Left EYE four times a day  pregabalin 150 milliGRAM(s) Oral daily  pregabalin 300 milliGRAM(s) Oral at bedtime  ranolazine 1000 milliGRAM(s) Oral two times a day  tamsulosin 0.4 milliGRAM(s) Oral at bedtime    MEDICATIONS  (PRN):  ALBUTerol    90 MICROgram(s) HFA Inhaler 2 Puff(s) Inhalation every 6 hours PRN Shortness of Breath and/or Wheezing  aluminum hydroxide/magnesium hydroxide/simethicone Suspension 30 milliLiter(s) Oral every 4 hours PRN Dyspepsia  artificial  tears Solution 1 Drop(s) Both EYES daily PRN Dry Eyes  guaiFENesin    Syrup 100 milliGRAM(s) Oral every 6 hours PRN Cough  ondansetron Injectable 4 milliGRAM(s) IV Push every 6 hours PRN Nausea and/or Vomiting      Allergies    Cipro (Unknown)  Demerol HCl (Hives)  iodine (Unknown)  iodine containing compounds (Anaphylaxis)  loperamide (Unknown)  phenylpiperidine derivatives (Unknown)  tramadol (Unknown)    Intolerances          LABS:                          9.9    4.2   )-----------( 135      ( 27 Nov 2017 06:02 )             30.7     11-27    142  |  101  |  27.0<H>  ----------------------------<  238<H>  4.3   |  29.0  |  1.28    Ca    9.3      27 Nov 2017 06:02      PTT - ( 27 Nov 2017 08:34 )  PTT:>200 sec      RADIOLOGY & ADDITIONAL TESTS: CC: Chest pain    INTERVAL HPI/OVERNIGHT EVENTS: Patient seen and examined, still having central chest squeezing sensation but improved.       Vital Signs Last 24 Hrs  T(C): 37.1 (27 Nov 2017 07:00), Max: 37.1 (26 Nov 2017 14:24)  T(F): 98.7 (27 Nov 2017 07:00), Max: 98.8 (26 Nov 2017 14:24)  HR: 83 (27 Nov 2017 08:00) (71 - 88)  BP: 139/61 (27 Nov 2017 08:00) (112/53 - 139/61)  BP(mean): 88 (27 Nov 2017 08:00) (76 - 89)  RR: 16 (27 Nov 2017 08:00) (14 - 63)  SpO2: 99% (27 Nov 2017 08:00) (95% - 100%)    PHYSICAL EXAM:    GENERAL: NAD, well-groomed, well-developed  CHEST/LUNG: Clear to auscultation bilaterally; No rales, rhonchi, wheezing, or rubs  HEART: Regular rate and rhythm; No murmurs, rubs, or gallops  ABDOMEN: Soft, Nontender, Nondistended; Bowel sounds present  EXTREMITIES:  2+ Peripheral Pulses, No clubbing, cyanosis, or edema        MEDICATIONS  (STANDING):  aspirin  chewable 81 milliGRAM(s) Oral daily  atorvastatin 80 milliGRAM(s) Oral at bedtime  carvedilol 6.25 milliGRAM(s) Oral every 12 hours  clopidogrel Tablet 75 milliGRAM(s) Oral daily  enalapril 2.5 milliGRAM(s) Oral daily  ferrous    sulfate 325 milliGRAM(s) Oral daily  furosemide    Tablet 40 milliGRAM(s) Oral every 12 hours  heparin  Injectable 5000 Unit(s) SubCutaneous every 12 hours  hydrALAZINE 25 milliGRAM(s) Oral every 8 hours  insulin glargine Injectable (LANTUS) 50 Unit(s) SubCutaneous every morning  insulin lispro (HumaLOG) corrective regimen sliding scale   SubCutaneous three times a day before meals  isosorbide   mononitrate ER Tablet (IMDUR) 120 milliGRAM(s) Oral daily  levothyroxine 112 MICROGram(s) Oral daily  pantoprazole    Tablet 40 milliGRAM(s) Oral before breakfast  prednisoLONE  forte 1% Suspension 1 Drop(s) Left EYE four times a day  pregabalin 150 milliGRAM(s) Oral daily  pregabalin 300 milliGRAM(s) Oral at bedtime  ranolazine 1000 milliGRAM(s) Oral two times a day  tamsulosin 0.4 milliGRAM(s) Oral at bedtime    MEDICATIONS  (PRN):  ALBUTerol    90 MICROgram(s) HFA Inhaler 2 Puff(s) Inhalation every 6 hours PRN Shortness of Breath and/or Wheezing  aluminum hydroxide/magnesium hydroxide/simethicone Suspension 30 milliLiter(s) Oral every 4 hours PRN Dyspepsia  artificial  tears Solution 1 Drop(s) Both EYES daily PRN Dry Eyes  guaiFENesin    Syrup 100 milliGRAM(s) Oral every 6 hours PRN Cough  ondansetron Injectable 4 milliGRAM(s) IV Push every 6 hours PRN Nausea and/or Vomiting      Allergies    Cipro (Unknown)  Demerol HCl (Hives)  iodine (Unknown)  iodine containing compounds (Anaphylaxis)  loperamide (Unknown)  phenylpiperidine derivatives (Unknown)  tramadol (Unknown)    Intolerances          LABS:                          9.9    4.2   )-----------( 135      ( 27 Nov 2017 06:02 )             30.7     11-27    142  |  101  |  27.0<H>  ----------------------------<  238<H>  4.3   |  29.0  |  1.28    Ca    9.3      27 Nov 2017 06:02      PTT - ( 27 Nov 2017 08:34 )  PTT:>200 sec      RADIOLOGY & ADDITIONAL TESTS:

## 2017-11-27 NOTE — PROGRESS NOTE ADULT - ASSESSMENT
Patient is a 62 year old female with PMH of HTN, HLD, DM, Retinopathy (legally blind), Psoriasis, CAD (recent cardiac cath in July 2017 showing 95% stenosis of LAD and 100% stenosis of RCA/Circ, good collateral circulation, not a candidate for CABG), leukemia (in remission), systolic CHF (EF: 25-30%), asthma, hypothyroid, and ANIKA who presented to the ED with complaints of SOB and acute onset of substernal chest pain with radiation to back, left arm, and jaw. s/p CODE STEMI and admitted for unstable angina management. Treated with IV heparin for 48 hours. Echocardiogram showed ef of 25 %. Ranexa and imdur were increased. To be discharged to follow up for her appointment at [place] for high risk LAD PCI.       Assessment/Plan:    1.  Unstable angina pectoris due to coronary arteriosclerosis- Failed intervention in July has been medically managed. Scheduled for high risk LAD PCI on 11/29/17 at [place].     COmpleted 48 hours of iv heparin   On Aspirin/plavix/bb/statin/acei  On hydralazine/imdur and ranexa  telemetry monitoring.   Trend cardiac enzymes.    Plan for discharge in AM  to follow up at [facility] for planned PCI     2.  Essential hypertension .Continue Coreg and enalapril with holding parameters. Low sodium diet. Increased hydralazine to 25mg PO TID. MOnitor bp       3.Pure hypercholesterolemia.  Recommendation: Continue Lipitor.    4. Acute on chronic systolic CHF- Stable, Lasix bid, on ACEi   Strict I/Os, daily weights. TTE reviewed.    5. MEGHA: Resolved. Monitor bmp     6. GERD: Po protonix and mylanta as needed    Transfer to [facility]

## 2017-11-27 NOTE — DISCHARGE NOTE ADULT - MEDICATION SUMMARY - MEDICATIONS TO CHANGE
I will SWITCH the dose or number of times a day I take the medications listed below when I get home from the hospital:    ranolazine 500 mg oral tablet, extended release  -- 1 tab(s) by mouth 2 times a day    isosorbide mononitrate 60 mg oral tablet, extended release  -- 1 tab(s) by mouth once a day

## 2017-11-27 NOTE — DISCHARGE NOTE ADULT - PLAN OF CARE
PCI at [place] Ranexa and imdur doses were increased  discharged to go to [place] today for PCI Continue po lasix  COntinue enalapril po Ranexa and imdur doses were increased  To be transferred to [place] for PCI Continue po lasix  COntinue enalapril po/ hydralazine/imdur and ranexa

## 2017-11-27 NOTE — DISCHARGE NOTE ADULT - PATIENT PORTAL LINK FT
“You can access the Follow[employer] Patient Portal, offered by [facility], by registering with the following website: [email or web address]”

## 2017-11-27 NOTE — DISCHARGE NOTE ADULT - CARE PLAN
Principal Discharge DX:	Unstable angina pectoris due to coronary arteriosclerosis  Goal:	PCI at [place]  Instructions for follow-up, activity and diet:	Ranexa and imdur doses were increased  discharged to go to [place] today for PCI  Secondary Diagnosis:	Acute on chronic systolic congestive heart failure  Instructions for follow-up, activity and diet:	Continue po lasix  COntinue enalapril po Principal Discharge DX:	Unstable angina pectoris due to coronary arteriosclerosis  Goal:	PCI at [place]  Instructions for follow-up, activity and diet:	Ranexa and imdur doses were increased  To be transferred to [place] for PCI  Secondary Diagnosis:	Acute on chronic systolic congestive heart failure  Instructions for follow-up, activity and diet:	Continue po lasix  COntinue enalapril po/ hydralazine/imdur and ranexa

## 2017-11-27 NOTE — DISCHARGE NOTE ADULT - CARE PROVIDER_API CALL
Tomas Chambers), Cardiovascular Disease; Internal Medicine  [address] [place] [address]  Phone: [phone]  Fax: [phone]

## 2017-11-27 NOTE — DISCHARGE NOTE ADULT - MEDICATION SUMMARY - MEDICATIONS TO TAKE
I will START or STAY ON the medications listed below when I get home from the hospital:    prednisoLONE  -- 1 drop(s) in the left eye 3 times a day  -- Indication: For Eye drops    aspirin 81 mg oral tablet  -- 1 tab(s) by mouth once a day  -- Indication: For Cad    enalapril 2.5 mg oral tablet  -- 1 tab(s) by mouth once a day  -- Indication: For Cad    tamsulosin 0.4 mg oral capsule  -- 1 cap(s) by mouth 2 times a day  -- Indication: For retention    isosorbide mononitrate 120 mg oral tablet, extended release  -- 1 tab(s) by mouth once a day  -- Indication: For Cad    ranolazine 1000 mg oral tablet, extended release  -- 1 tab(s) by mouth 2 times a day  -- Indication: For Cad    pregabalin 300 mg oral capsule  -- 1 cap(s) by mouth once a day (at bedtime)  -- Indication: For neuropathy    pregabalin 150 mg oral capsule  -- 1 cap(s) by mouth once a day  -- Indication: For neuropathy    Tresiba FlexTouch  -- 75 unit(s) subcutaneous once a day  -- Indication: For Diabetes mellitus    cetirizine 10 mg oral tablet  -- 1 tab(s) by mouth once a day  -- Indication: For Allergies    atorvastatin 80 mg oral tablet  -- 1 tab(s) by mouth once a day (at bedtime)  -- Indication: For Cad    clopidogrel 75 mg oral tablet  -- 1 tab(s) by mouth once a day  -- Indication: For Cad    carvedilol 6.25 mg oral tablet  -- 1 tab(s) by mouth every 12 hours  -- Indication: For Cad    Ventolin HFA 90 mcg/inh inhalation aerosol  -- PRN for asthma  -- Indication: For Asthma    clotrimazole topical  -- Apply on skin to affected area , As Needed  -- Indication: For rash    Lasix 40 mg oral tablet  -- 1 tab(s) by mouth 2 times a day  -- Indication: For CHF (congestive heart failure)    ferrous sulfate 325 mg (65 mg elemental iron) oral tablet  -- 1  by mouth once a day  -- Indication: For Anemia    ocular lubricant ophthalmic solution  -- 1 drop(s) to each affected eye once a day  -- Indication: For Eye drops    pantoprazole 40 mg oral delayed release tablet  -- 1 tab(s) by mouth once a day (before a meal)  -- Indication: For gerd    levothyroxine 112 mcg (0.112 mg) oral tablet  -- 1 tab(s) by mouth once a day  -- Indication: For Hypothyroidism    hydrALAZINE 10 mg oral tablet  -- 1 tab(s) by mouth 3 times a day  -- Indication: For Hypertension

## 2017-11-27 NOTE — DISCHARGE NOTE ADULT - HOSPITAL COURSE
Patient is a 62 year old female with PMH of HTN, HLD, DM, Retinopathy (legally blind), Psoriasis, CAD (recent cardiac cath in July 2017 showing 95% stenosis of LAD and 100% stenosis of RCA/Circ, good collateral circulation, not a candidate for CABG), leukemia (in remission), systolic CHF (EF: 25-30%), asthma, hypothyroid, and ANIKA who presented to the ED with complaints of SOB and acute onset of substernal chest pain with radiation to back, left arm, and jaw. s/p CODE STEMI and admitted for unstable angina management. Treated with IV heparin for 48 hours. Echocardiogram showed ef of 25 %. Ranexa and imdur were increased. Acute on chronic systolic CHF and MEGHA improved with diuresis     To be discharged to follow up for her appointment at [place] for high risk LAD PCI. Patient is a 62 year old female with PMH of HTN, HLD, DM, Retinopathy (legally blind), Psoriasis, CAD (recent cardiac cath in July 2017 showing 95% stenosis of LAD and 100% stenosis of RCA/Circ, good collateral circulation, not a candidate for CABG), leukemia (in remission), systolic CHF (EF: 25-30%), asthma, hypothyroid, and ANIKA who presented to the ED with complaints of SOB and acute onset of substernal chest pain with radiation to back, left arm, and jaw. s/p CODE STEMI and admitted for unstable angina management. Treated with IV heparin for 48 hours. Echocardiogram showed ef of 25 %. Ranexa and imdur were increased. Acute on chronic systolic CHF and MEGHA improved with diuresis     To be transferred to [place] for high risk LAD PCI.     45 mins spent coordinating care and discharge.

## 2017-11-27 NOTE — PROGRESS NOTE ADULT - SUBJECTIVE AND OBJECTIVE BOX
GEMINI ROONEY  [id number]        Chief Complaint: f/u unstable angina      Subjective: mild recurrent CP earlier, now resolved      24 hour Tele: SR, no events        ALBUTerol    90 MICROgram(s) HFA Inhaler 2 Puff(s) Inhalation every 6 hours PRN  aluminum hydroxide/magnesium hydroxide/simethicone Suspension 30 milliLiter(s) Oral every 4 hours PRN  artificial  tears Solution 1 Drop(s) Both EYES daily PRN  aspirin  chewable 81 milliGRAM(s) Oral daily  atorvastatin 80 milliGRAM(s) Oral at bedtime  carvedilol 6.25 milliGRAM(s) Oral every 12 hours  clopidogrel Tablet 75 milliGRAM(s) Oral daily  enalapril 2.5 milliGRAM(s) Oral daily  ferrous    sulfate 325 milliGRAM(s) Oral daily  furosemide    Tablet 40 milliGRAM(s) Oral every 12 hours  guaiFENesin    Syrup 100 milliGRAM(s) Oral every 6 hours PRN  heparin  Infusion.  Unit(s)/Hr IV Continuous <Continuous>  hydrALAZINE 25 milliGRAM(s) Oral every 8 hours  insulin glargine Injectable (LANTUS) 50 Unit(s) SubCutaneous every morning  insulin lispro (HumaLOG) corrective regimen sliding scale   SubCutaneous three times a day before meals  isosorbide   mononitrate ER Tablet (IMDUR) 120 milliGRAM(s) Oral daily  levothyroxine 112 MICROGram(s) Oral daily  ondansetron Injectable 4 milliGRAM(s) IV Push every 6 hours PRN  pantoprazole    Tablet 40 milliGRAM(s) Oral before breakfast  prednisoLONE  forte 1% Suspension 1 Drop(s) Left EYE four times a day  pregabalin 150 milliGRAM(s) Oral daily  pregabalin 300 milliGRAM(s) Oral at bedtime  ranolazine 1000 milliGRAM(s) Oral two times a day  tamsulosin 0.4 milliGRAM(s) Oral at bedtime          Physical Exam:  T(C): 37.1 (11-27-17 @ 07:00), Max: 37.1 (11-26-17 @ 14:24)  HR: 83 (11-27-17 @ 08:00) (71 - 88)  BP: 139/61 (11-27-17 @ 08:00) (112/53 - 139/61)  RR: 16 (11-27-17 @ 08:00) (14 - 63)  SpO2: 99% (11-27-17 @ 08:00) (95% - 100%)  Wt(kg): --  General: Comfortable in NAD  HEENT: MMM, sclera anicteric  Resp: CTA bilaterally  CVS: nl s1s2, rrr, no s3/JVD  Abd: soft, NT, ND, BS+  Ext: No c/c/e  Neuro A&O x3  Psych: Normal affect    I&O's Summary    26 Nov 2017 07:01  -  27 Nov 2017 07:00  --------------------------------------------------------  IN: 1028 mL / OUT: 1600 mL / NET: -572 mL          Labs:   27 Nov 2017 06:02    142    |  101    |  27.0   ----------------------------<  238    4.3     |  29.0   |  1.28     Ca    9.3        27 Nov 2017 06:02                            9.9    4.2   )-----------( 135      ( 27 Nov 2017 06:02 )             30.7     PTT - ( 27 Nov 2017 08:34 )  PTT:>200 sec  CARDIAC MARKERS ( 25 Nov 2017 12:47 )  x     / 0.08 ng/mL / x     / x     / x            Assessment:  63y/o Female with PMHx HTN, HLD, DM, CAD (recent cardiac cath in July 2017 showing 95% stenosis of LAD and 100% stenosis of RCA/Circ, good collateral circulation, not a candidate for CABG), leukemia (in remission), systolic CHF (EF: 25-30%), asthma, hypothyroid, ANIKA, presents to ED with SOB and acute onset of substernal chest pain.  -ECG unchanged from prior, trops borderline elevated consistent with mild demand ischemia  -LAD territory on echo preserved  -Potential for revascularization of the complex LAD stenosis is limited.  -She is scheduled for a high risk procedure at [facility] on Tues 11/29    Plan:  1. Continue with higher dose of Ranexa and Isosorbide increased to 120mg daily  2. DC heparin  3. Continue DAPT  4. DC first thing am tomorrow and patient scheduled for complex PCI at [facility]

## 2017-11-27 NOTE — DISCHARGE NOTE ADULT - VISION (WITH CORRECTIVE LENSES IF THE PATIENT USUALLY WEARS THEM):
legally blind in right eye/blind in left eye/Severely impaired: cannot locate objects without hearing or touching them or patient nonresponsive.

## 2017-11-28 VITALS
RESPIRATION RATE: 20 BRPM | TEMPERATURE: 98 F | OXYGEN SATURATION: 95 % | HEART RATE: 72 BPM | DIASTOLIC BLOOD PRESSURE: 76 MMHG | SYSTOLIC BLOOD PRESSURE: 157 MMHG

## 2017-11-28 LAB
APTT BLD: 55.1 SEC — HIGH (ref 27.5–37.4)
GLUCOSE BLDC GLUCOMTR-MCNC: 237 MG/DL — HIGH (ref 70–99)
GLUCOSE BLDC GLUCOMTR-MCNC: 286 MG/DL — HIGH (ref 70–99)
HCT VFR BLD CALC: 27.7 % — LOW (ref 37–47)
HGB BLD-MCNC: 8.7 G/DL — LOW (ref 12–16)
MCHC RBC-ENTMCNC: 30 PG — SIGNIFICANT CHANGE UP (ref 27–31)
MCHC RBC-ENTMCNC: 31.4 G/DL — LOW (ref 32–36)
MCV RBC AUTO: 95.5 FL — SIGNIFICANT CHANGE UP (ref 81–99)
PLATELET # BLD AUTO: 133 K/UL — LOW (ref 150–400)
RBC # BLD: 2.9 M/UL — LOW (ref 4.4–5.2)
RBC # FLD: 13.6 % — SIGNIFICANT CHANGE UP (ref 11–15.6)
WBC # BLD: 4.7 K/UL — LOW (ref 4.8–10.8)
WBC # FLD AUTO: 4.7 K/UL — LOW (ref 4.8–10.8)

## 2017-11-28 PROCEDURE — 99239 HOSP IP/OBS DSCHRG MGMT >30: CPT

## 2017-11-28 PROCEDURE — 85027 COMPLETE CBC AUTOMATED: CPT

## 2017-11-28 PROCEDURE — 96374 THER/PROPH/DIAG INJ IV PUSH: CPT

## 2017-11-28 PROCEDURE — 36415 COLL VENOUS BLD VENIPUNCTURE: CPT

## 2017-11-28 PROCEDURE — 83880 ASSAY OF NATRIURETIC PEPTIDE: CPT

## 2017-11-28 PROCEDURE — 71045 X-RAY EXAM CHEST 1 VIEW: CPT

## 2017-11-28 PROCEDURE — 84484 ASSAY OF TROPONIN QUANT: CPT

## 2017-11-28 PROCEDURE — 80053 COMPREHEN METABOLIC PANEL: CPT

## 2017-11-28 PROCEDURE — 80061 LIPID PANEL: CPT

## 2017-11-28 PROCEDURE — 97163 PT EVAL HIGH COMPLEX 45 MIN: CPT

## 2017-11-28 PROCEDURE — 85730 THROMBOPLASTIN TIME PARTIAL: CPT

## 2017-11-28 PROCEDURE — 82962 GLUCOSE BLOOD TEST: CPT

## 2017-11-28 PROCEDURE — 80048 BASIC METABOLIC PNL TOTAL CA: CPT

## 2017-11-28 PROCEDURE — 83735 ASSAY OF MAGNESIUM: CPT

## 2017-11-28 PROCEDURE — 93005 ELECTROCARDIOGRAM TRACING: CPT

## 2017-11-28 PROCEDURE — 85610 PROTHROMBIN TIME: CPT

## 2017-11-28 PROCEDURE — 97116 GAIT TRAINING THERAPY: CPT

## 2017-11-28 PROCEDURE — 83036 HEMOGLOBIN GLYCOSYLATED A1C: CPT

## 2017-11-28 PROCEDURE — 84100 ASSAY OF PHOSPHORUS: CPT

## 2017-11-28 PROCEDURE — 82550 ASSAY OF CK (CPK): CPT

## 2017-11-28 PROCEDURE — 93306 TTE W/DOPPLER COMPLETE: CPT

## 2017-11-28 PROCEDURE — 99285 EMERGENCY DEPT VISIT HI MDM: CPT | Mod: 25

## 2017-11-28 PROCEDURE — 94640 AIRWAY INHALATION TREATMENT: CPT

## 2017-11-28 RX ORDER — MORPHINE SULFATE 50 MG/1
5 CAPSULE, EXTENDED RELEASE ORAL ONCE
Qty: 0 | Refills: 0 | Status: DISCONTINUED | OUTPATIENT
Start: 2017-11-28 | End: 2017-11-28

## 2017-11-28 RX ADMIN — Medication 81 MILLIGRAM(S): at 11:37

## 2017-11-28 RX ADMIN — MORPHINE SULFATE 5 MILLIGRAM(S): 50 CAPSULE, EXTENDED RELEASE ORAL at 12:25

## 2017-11-28 RX ADMIN — CARVEDILOL PHOSPHATE 6.25 MILLIGRAM(S): 80 CAPSULE, EXTENDED RELEASE ORAL at 06:32

## 2017-11-28 RX ADMIN — Medication 150 MILLIGRAM(S): at 11:44

## 2017-11-28 RX ADMIN — Medication 6: at 07:55

## 2017-11-28 RX ADMIN — ISOSORBIDE MONONITRATE 120 MILLIGRAM(S): 60 TABLET, EXTENDED RELEASE ORAL at 11:38

## 2017-11-28 RX ADMIN — Medication 1 APPLICATION(S): at 11:39

## 2017-11-28 RX ADMIN — MORPHINE SULFATE 5 MILLIGRAM(S): 50 CAPSULE, EXTENDED RELEASE ORAL at 12:40

## 2017-11-28 RX ADMIN — Medication 40 MILLIGRAM(S): at 06:32

## 2017-11-28 RX ADMIN — Medication 112 MICROGRAM(S): at 06:32

## 2017-11-28 RX ADMIN — Medication 1 APPLICATION(S): at 06:33

## 2017-11-28 RX ADMIN — Medication 3 MILLILITER(S): at 00:17

## 2017-11-28 RX ADMIN — Medication 1 DROP(S): at 11:39

## 2017-11-28 RX ADMIN — HEPARIN SODIUM 5000 UNIT(S): 5000 INJECTION INTRAVENOUS; SUBCUTANEOUS at 06:32

## 2017-11-28 RX ADMIN — Medication 4: at 11:37

## 2017-11-28 RX ADMIN — Medication 325 MILLIGRAM(S): at 11:43

## 2017-11-28 RX ADMIN — Medication 2.5 MILLIGRAM(S): at 06:32

## 2017-11-28 RX ADMIN — Medication 1 DROP(S): at 06:32

## 2017-11-28 RX ADMIN — RANOLAZINE 1000 MILLIGRAM(S): 500 TABLET, FILM COATED, EXTENDED RELEASE ORAL at 07:55

## 2017-11-28 RX ADMIN — CLOPIDOGREL BISULFATE 75 MILLIGRAM(S): 75 TABLET, FILM COATED ORAL at 11:43

## 2017-11-28 RX ADMIN — ONDANSETRON 4 MILLIGRAM(S): 8 TABLET, FILM COATED ORAL at 07:55

## 2017-11-28 RX ADMIN — INSULIN GLARGINE 50 UNIT(S): 100 INJECTION, SOLUTION SUBCUTANEOUS at 07:54

## 2017-11-28 RX ADMIN — PANTOPRAZOLE SODIUM 40 MILLIGRAM(S): 20 TABLET, DELAYED RELEASE ORAL at 06:32

## 2017-11-28 NOTE — PROGRESS NOTE ADULT - SUBJECTIVE AND OBJECTIVE BOX
GEMINI ROONEY  [id number]      Chief Complaint: f/u unstable angina  Transferring to [place] for intervention    Subjective: mild recurrent CP last night, similar to that of admission. Quintin resolved  Decrease in BP responded to fluids    24 hour Tele: SR, no events      ALBUTerol    90 MICROgram(s) HFA Inhaler 2 Puff(s) Inhalation every 6 hours PRN  ALBUTerol/ipratropium for Nebulization 3 milliLiter(s) Nebulizer every 6 hours PRN  aluminum hydroxide/magnesium hydroxide/simethicone Suspension 30 milliLiter(s) Oral every 4 hours PRN  artificial  tears Solution 1 Drop(s) Both EYES daily PRN  aspirin  chewable 81 milliGRAM(s) Oral daily  atorvastatin 80 milliGRAM(s) Oral at bedtime  carvedilol 6.25 milliGRAM(s) Oral every 12 hours  clopidogrel Tablet 75 milliGRAM(s) Oral daily  enalapril 2.5 milliGRAM(s) Oral daily  ferrous    sulfate 325 milliGRAM(s) Oral daily  furosemide    Tablet 40 milliGRAM(s) Oral daily  guaiFENesin    Syrup 100 milliGRAM(s) Oral every 6 hours PRN  heparin  Injectable 5000 Unit(s) SubCutaneous every 12 hours  insulin glargine Injectable (LANTUS) 50 Unit(s) SubCutaneous every morning  insulin lispro (HumaLOG) corrective regimen sliding scale   SubCutaneous three times a day before meals  isosorbide   mononitrate ER Tablet (IMDUR) 120 milliGRAM(s) Oral daily  levothyroxine 112 MICROGram(s) Oral daily  morphine  - Injectable 5 milliGRAM(s) IV Push once PRN  ondansetron Injectable 4 milliGRAM(s) IV Push every 6 hours PRN  pantoprazole    Tablet 40 milliGRAM(s) Oral before breakfast  petrolatum white Ointment 1 Application(s) Topical four times a day  prednisoLONE  forte 1% Suspension 1 Drop(s) Left EYE four times a day  pregabalin 150 milliGRAM(s) Oral daily  pregabalin 300 milliGRAM(s) Oral at bedtime  ranolazine 1000 milliGRAM(s) Oral two times a day  tamsulosin 0.4 milliGRAM(s) Oral at bedtime          Physical Exam:  T(C): 37 (11-28-17 @ 09:00), Max: 37 (11-28-17 @ 09:00)  HR: 77 (11-28-17 @ 08:00) (75 - 91)  BP: 137/75 (11-28-17 @ 08:00) (78/37 - 155/67)  RR: 20 (11-28-17 @ 08:00) (14 - 38)  SpO2: 96% (11-28-17 @ 08:00) (88% - 99%)  Wt(kg): --  General: Morbidly obese Comfortable in NAD  Neck: No JVD  CVS: nl s1s2, no s3  Pulm: CTA b/l  Abd: soft, non-tender  Ext: No c/c/e  Neuro A&O x3  Psych: Normal affect    I&O's Summary    27 Nov 2017 07:01  -  28 Nov 2017 07:00  --------------------------------------------------------  IN: 734 mL / OUT: 900 mL / NET: -166 mL          Labs:   27 Nov 2017 06:02    142    |  101    |  27.0   ----------------------------<  238    4.3     |  29.0   |  1.28     Ca    9.3        27 Nov 2017 06:02                            8.7    4.7   )-----------( 133      ( 28 Nov 2017 03:33 )             27.7     PTT - ( 28 Nov 2017 11:05 )  PTT:55.1 sec  CARDIAC MARKERS ( 27 Nov 2017 15:57 )  x     / 0.22 ng/mL / x     / x     / x            sessment:  61y/o Female with PMHx HTN, HLD, DM, CAD (recent cardiac cath in July 2017 showing 95% stenosis of LAD and 100% stenosis of RCA/Circ, good collateral circulation, not a candidate for CABG), leukemia (in remission), systolic CHF (EF: 40% asthma, hypothyroid, ANIKA, presents to ED with SOB and acute onset of substernal chest pain.  -ECG unchanged from prior, trops borderline elevated consistent with mild demand ischemia  -LAD territory on echo preserved  -Potential for revascularization of the complex LAD stenosis is limited.  -She is scheduled for a high risk procedure at [facility] on Tues 11/29    Plan:  1. Continue with higher dose of Ranexa and Isosorbide increased to 120mg daily  2. IOff heparin  3. Continue DAPT  4. Transfer today arranged.

## 2017-11-28 NOTE — PROGRESS NOTE ADULT - PROVIDER SPECIALTY LIST ADULT
Cardiology
Critical Care
Hospitalist
Cardiology
Hospitalist

## 2017-11-28 NOTE — PROGRESS NOTE ADULT - ASSESSMENT
Patient is a 62 year old female with PMH of HTN, HLD, DM, Retinopathy (legally blind), Psoriasis, CAD (recent cardiac cath in July 2017 showing 95% stenosis of LAD and 100% stenosis of RCA/Circ, good collateral circulation, not a candidate for CABG), leukemia (in remission), systolic CHF (EF: 25-30%), asthma, hypothyroid, and ANIKA who presented to the ED with complaints of SOB and acute onset of substernal chest pain with radiation to back, left arm, and jaw. s/p CODE STEMI and admitted for unstable angina management. Treated with IV heparin for 48 hours. Echocardiogram showed ef of 25 %. Ranexa and imdur were increased. To be discharged to follow up for her appointment at [place] for high risk LAD PCI.       Assessment/Plan:    1.  Unstable angina pectoris due to coronary arteriosclerosis- Failed intervention in July has been medically managed. Scheduled for high risk LAD PCI on 11/29/17 at [facility].     COmpleted 48 hours of iv heparin   On Aspirin/plavix/bb/statin/acei  On hydralazine/imdur and ranexa  telemetry monitoring.   Trend cardiac enzymes.      2.  Essential hypertension .Continue Coreg and enalapril with holding parameters. Low sodium diet. Hydralazine discontinued due to hypotension.       3.Pure hypercholesterolemia.  Recommendation: Continue Lipitor.    4. Acute on chronic systolic CHF- Stable, Lasix BID  on ACEi   Strict I/Os, daily weights. TTE reviewed.    5. MEGHA: Resolved. Monitor bmp     6. GERD: Po protonix and mylanta as needed    Plan  for transfer to [facility], accepting physician is Dr Castro Hayes, report was given. CM consulted.

## 2017-11-28 NOTE — PROGRESS NOTE ADULT - SUBJECTIVE AND OBJECTIVE BOX
CC: Chest pain    INTERVAL HPI/OVERNIGHT EVENTS: Patient seen and examined, still having subtle chest pain much improved since admission. Denies chest pain. Had an episode of hypotension yesterday improved with a bolus of normal saline x 1 and medication adjustments. No acute events overnight.       Vital Signs Last 24 Hrs  T(C): 36.8 (27 Nov 2017 20:00), Max: 36.9 (27 Nov 2017 12:30)  T(F): 98.2 (27 Nov 2017 20:00), Max: 98.5 (27 Nov 2017 12:30)  HR: 77 (28 Nov 2017 08:00) (75 - 91)  BP: 137/75 (28 Nov 2017 08:00) (78/37 - 155/67)  BP(mean): 99 (28 Nov 2017 08:00) (53 - 99)  RR: 20 (28 Nov 2017 08:00) (14 - 38)  SpO2: 96% (28 Nov 2017 08:00) (88% - 99%)    PHYSICAL EXAM:    GENERAL: NAD, well-groomed, well-developed  CHEST/LUNG: Clear to auscultation bilaterally; No rales, rhonchi, wheezing, or rubs  HEART: Regular rate and rhythm; No murmurs, rubs, or gallops  ABDOMEN: Soft, Nontender, Nondistended; Bowel sounds present  EXTREMITIES:  2+ Peripheral Pulses, No clubbing, cyanosis, or edema        MEDICATIONS  (STANDING):  aspirin  chewable 81 milliGRAM(s) Oral daily  atorvastatin 80 milliGRAM(s) Oral at bedtime  carvedilol 6.25 milliGRAM(s) Oral every 12 hours  clopidogrel Tablet 75 milliGRAM(s) Oral daily  enalapril 2.5 milliGRAM(s) Oral daily  ferrous    sulfate 325 milliGRAM(s) Oral daily  furosemide    Tablet 40 milliGRAM(s) Oral daily  heparin  Injectable 5000 Unit(s) SubCutaneous every 12 hours  insulin glargine Injectable (LANTUS) 50 Unit(s) SubCutaneous every morning  insulin lispro (HumaLOG) corrective regimen sliding scale   SubCutaneous three times a day before meals  isosorbide   mononitrate ER Tablet (IMDUR) 120 milliGRAM(s) Oral daily  levothyroxine 112 MICROGram(s) Oral daily  pantoprazole    Tablet 40 milliGRAM(s) Oral before breakfast  petrolatum white Ointment 1 Application(s) Topical four times a day  prednisoLONE  forte 1% Suspension 1 Drop(s) Left EYE four times a day  pregabalin 150 milliGRAM(s) Oral daily  pregabalin 300 milliGRAM(s) Oral at bedtime  ranolazine 1000 milliGRAM(s) Oral two times a day  tamsulosin 0.4 milliGRAM(s) Oral at bedtime    MEDICATIONS  (PRN):  ALBUTerol    90 MICROgram(s) HFA Inhaler 2 Puff(s) Inhalation every 6 hours PRN Shortness of Breath and/or Wheezing  ALBUTerol/ipratropium for Nebulization 3 milliLiter(s) Nebulizer every 6 hours PRN Shortness of Breath and/or Wheezing  aluminum hydroxide/magnesium hydroxide/simethicone Suspension 30 milliLiter(s) Oral every 4 hours PRN Dyspepsia  artificial  tears Solution 1 Drop(s) Both EYES daily PRN Dry Eyes  guaiFENesin    Syrup 100 milliGRAM(s) Oral every 6 hours PRN Cough  ondansetron Injectable 4 milliGRAM(s) IV Push every 6 hours PRN Nausea and/or Vomiting      Allergies    Cipro (Unknown)  Demerol HCl (Hives)  iodine (Unknown)  iodine containing compounds (Anaphylaxis)  loperamide (Unknown)  phenylpiperidine derivatives (Unknown)  tramadol (Unknown)    Intolerances          LABS:                          8.7    4.7   )-----------( 133      ( 28 Nov 2017 03:33 )             27.7     11-27    142  |  101  |  27.0<H>  ----------------------------<  238<H>  4.3   |  29.0  |  1.28    Ca    9.3      27 Nov 2017 06:02      PTT - ( 27 Nov 2017 08:34 )  PTT:>200 sec      RADIOLOGY & ADDITIONAL TESTS:

## 2017-12-15 PROCEDURE — 92920 PRQ TRLUML C ANGIOP 1ART&/BR: CPT | Mod: LD

## 2017-12-15 PROCEDURE — 36415 COLL VENOUS BLD VENIPUNCTURE: CPT

## 2017-12-15 PROCEDURE — 83036 HEMOGLOBIN GLYCOSYLATED A1C: CPT

## 2017-12-15 PROCEDURE — 85027 COMPLETE CBC AUTOMATED: CPT

## 2017-12-15 PROCEDURE — 85610 PROTHROMBIN TIME: CPT

## 2017-12-15 PROCEDURE — C1769: CPT

## 2017-12-15 PROCEDURE — C1885: CPT

## 2017-12-15 PROCEDURE — C1894: CPT

## 2017-12-15 PROCEDURE — 93454 CORONARY ARTERY ANGIO S&I: CPT | Mod: 59

## 2017-12-15 PROCEDURE — 80048 BASIC METABOLIC PNL TOTAL CA: CPT

## 2017-12-15 PROCEDURE — 83735 ASSAY OF MAGNESIUM: CPT

## 2017-12-15 PROCEDURE — C1887: CPT

## 2017-12-15 PROCEDURE — 94640 AIRWAY INHALATION TREATMENT: CPT

## 2017-12-15 PROCEDURE — 93005 ELECTROCARDIOGRAM TRACING: CPT

## 2017-12-15 PROCEDURE — C1725: CPT

## 2018-02-05 ENCOUNTER — RX RENEWAL (OUTPATIENT)
Age: 63
End: 2018-02-05

## 2018-02-07 ENCOUNTER — MEDICATION RENEWAL (OUTPATIENT)
Age: 63
End: 2018-02-07

## 2018-02-07 RX ORDER — NITROGLYCERIN 400 UG/1
400 AEROSOL, METERED SUBLINGUAL
Qty: 4.1 | Refills: 3 | Status: ACTIVE | COMMUNITY
Start: 2018-02-05 | End: 1900-01-01

## 2018-04-09 NOTE — DISCHARGE NOTE ADULT - NURSING SECTION COMPLETE
Yes it is okay to restart sertraline at 50 mg once daily    I will send to pharmacy Patient/Caregiver provided printed discharge information.

## 2019-11-14 NOTE — DISCHARGE NOTE ADULT - PROVIDER TOKENS
FREE:[LAST:[CARDIOLOGY],PHONE:[(   )    -],FAX:[(   )    -],ADDRESS:[DR REBOLLAR WITHIN 1 WEEK.]]
no

## 2019-12-16 NOTE — PROGRESS NOTE ADULT - ASSESSMENT
61 y/o female with PMH of  CAD, ANIKA, HTN, HLD, GERD, Hypothyroid, peripheral neuropathy presented with acute on chronic systolic CHF exacerbation, Viral bronchitis, and Acute hypercapnic resp. failure.     -SOB 2/2 Acute on chronic systolic congestive heart failure.    secondary to acute on chronic systolic CHF.   on room air.   cardio noted cw lasix to 40mg iv bid.     -Acute respiratory failure with hypercapnia.   Plan: Plan as above.     -Coronary artery disease of native artery of native heart with stable angina pectoris.  cw home medications   F/U with Dr. Hayes on 11/29 for intervention as stated above.     - Type 2 diabetes mellitus with other circulatory complication, with long-term current use of insulin.    Plan: Lantus, Humalog with meals, Coverage insulin.     - Hyperlipidemia, unspecified hyperlipidemia type.   Plan: statin, cardiac diet.     - Essential hypertension.  cw coreg and enalapril.    - Diabetic polyneuropathy associated with type 2 diabetes mellitus.   Plan: Cont. Lyrica, fall risk protocol.     -Psoriatic arthritis.   Plan: cont. o/p regimen.     -Asthma, unspecified asthma severity, unspecified whether complicated, unspecified whether persistent.  Plan: Duonebs prn, not wheezing as of now, no role to cont. steroids at this time.     - Gastroesophageal reflux disease, esophagitis presence not specified.   Plan; PPI.    -DVT ppx:  cw lovenox 61 y/o female with PMH of  CAD, ANIKA, HTN, HLD, GERD, Hypothyroid, peripheral neuropathy presented with acute on chronic systolic CHF exacerbation, Viral bronchitis, and Acute hypercapnic resp. failure.     -SOB 2/2 Acute on chronic systolic congestive heart failure.    secondary to acute on chronic systolic CHF.   on room air.   cw lasix to 40mg iv bid. cardio noted.     -Acute respiratory failure with hypercapnia.   Plan: Plan as above.     -Coronary artery disease of native artery of native heart with stable angina pectoris.  cw home medications   F/U with Dr. Hayes on 11/29 for intervention as stated above.     - Type 2 diabetes mellitus with other circulatory complication, with long-term current use of insulin.    Plan: Lantus, Humalog with meals, Coverage insulin. a1c pending.     - Hyperlipidemia, unspecified hyperlipidemia type.   Plan: statin, cardiac diet.     - Essential hypertension.  cw coreg and enalapril.    - Diabetic polyneuropathy associated with type 2 diabetes mellitus.   Plan: Cont. Lyrica, fall risk protocol.     -Psoriatic arthritis.   Plan: cont. o/p regimen.     -Asthma, unspecified asthma severity, unspecified whether complicated, unspecified whether persistent.   Plan: Duonebs prn, not wheezing as of now,     - Gastroesophageal reflux disease, esophagitis presence not specified.   Plan; PPI.    -DVT ppx:  aye lovenox    KRYSTA planning in am 63 y/o female with PMH of  CAD, ANIKA, HTN, HLD, GERD, Hypothyroid, peripheral neuropathy presented with acute on chronic systolic CHF exacerbation, Viral bronchitis, and Acute hypercapnic resp. failure.     -SOB 2/2 Acute on chronic systolic congestive heart failure.    secondary to acute on chronic systolic CHF.   on room air.   cw lasix to 40mg iv bid. cardio noted.     -Acute respiratory failure with hypercapnia.   Plan: Plan as above.     -Coronary artery disease of native artery of native heart with stable angina pectoris.  cw home medications   F/U with Dr. Hayes on 11/29 for intervention as stated above.     - Type 2 diabetes mellitus with other circulatory complication, with long-term current use of insulin.    Plan: Lantus, Humalog sliding scale with meals, Coverage insulin. a1c pending.     - Hyperlipidemia, unspecified hyperlipidemia type.   Plan: statin, cardiac diet.     - Essential hypertension.  cw coreg and enalapril.    - Diabetic polyneuropathy associated with type 2 diabetes mellitus.   Plan: Cont. Lyrica, fall risk protocol.     -Psoriatic arthritis.   Plan: cont. o/p regimen.     -Asthma, unspecified asthma severity, unspecified whether complicated, unspecified whether persistent.   Plan: Duonebs prn.    - Gastroesophageal reflux disease, esophagitis presence not specified.   Plan; PPI.    -DVT ppx:  aye lovenox    DC planning in am oral

## 2020-09-24 NOTE — DIETITIAN INITIAL EVALUATION ADULT. - SOURCE
75 y.o. female, with PMH of HTN, HLD, depression, venous insufficiency, arthritis admitted to observation with left leg pain and difficulty walking s/p fall 2 weeks ago.  US showed no DVT. X-ray showed no fractures or dislocations. Started on antibiotics for cellulitis, that improved; however, patient c/o continual pain albeit improved from admission. CT revealed No fracture seen of the fibula, tibia or ankle osseous structures. Significant  fluid accumulation in the pre patellar region and circumferential soft tissue edema seen of the entire left lower extremity most prominent at the ankle region. Suspect pain secondary to hematoma. PT/OT ordered, recommendation for rolling walker and home health. Discussed plan of care with patient and son; someone will be with her 24 hours to assist at discharge. Home health to follow. Stable, for discharge home.    patient

## 2021-07-13 NOTE — ED PROVIDER NOTE - HEME/LYMPH NEGATIVE STATEMENT, MLM
rpt trop/EKG, NST read/awaiting lab draw no anemia, no easy bruising, no jaundice, no swollen lymph nodes.

## 2023-09-14 NOTE — DIETITIAN INITIAL EVALUATION ADULT. - NS AS NUTRI INTERV ED CONTENT2
Physical Therapy Visit    Visit Type: Daily Treatment Note  Visit: 23  Referring Provider: Guero Rose DO  Medical Diagnosis (from order): Diagnosis Information    Diagnosis  M75.122 (ICD-10-CM) - Complete tear of left rotator cuff, unspecified whether traumatic         SUBJECTIVE                                                                                                               Did not have pain this morning, but limited motion as could not reach out to reach  Faucet   HEP compliant   Pain is by biceps anchor today      Pain / Symptoms  - Pain rating (out of 10): Current: 0 ; Worst: 6      OBJECTIVE                                                                                                                                       Treatment     Therapeutic Exercise  ARM bike level 2 2/2 minutes   Pulleys flexion and scaption    Prom all planes of shoulder, all  Pain at end range   AAROM and AROM Supine flexion, punches, horizontal adduction 3 x 10  Side lying horizontal abduction, scaption  3 x 10 \"one finger\" assist      Skilled input: verbal instruction/cues, tactile instruction/cues and posture correction  education/instruction on: reminded patient about ice heat adn rubs to address pain,     Writer verbally educated and received verbal consent for hand placement, positioning of patient, and techniques to be performed today from patient for therapist position for techniques and hand placement and palpation for techniques as described above and how they are pertinent to the patient's plan of care.      ASSESSMENT                                                                                                            1. Today only needed \"one finger\" of assist to get though supine and sidelying exercise   2. Pain continues to be a limiting factor    3. Biceps, coracobrachialis tightness   Pain/symptoms after session (out of 10): 2  Education:   - Results of above outlined education: Verbalizes  understanding and Needs reinforcement    PLAN                                                                                                                           Suggestions for next session as indicated: Attempt active range no assist         Therapy procedure time and total treatment time can be found documented on the Time Entry flowsheet     declined due to previous knowledge

## 2024-02-20 NOTE — ASU PATIENT PROFILE, ADULT - PMH
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

## 2025-01-23 NOTE — PATIENT PROFILE ADULT. - NS TRANSFER PATIENT BELONGINGS
Pharmacy faxed in a request for prior authorization on:    Medication: Mounjaro   Dosage: 12.5mg   Quantity requested:  2ml w/ 3refill   Pharmacy for prescription has been selected.    Initiation of prior authorization needed.    
Jewelry/Money (specify)/Cell Phone/PDA (specify)/, CPAP machine, wallet, purse/Other belongings

## 2025-07-05 NOTE — DISCHARGE NOTE ADULT - CARE PROVIDERS DIRECT ADDRESSES
Problem: Chronic Conditions and Co-morbidities  Goal: Patient's chronic conditions and co-morbidity symptoms are monitored and maintained or improved  7/5/2025 0024 by Milton Reid RN  Outcome: Progressing  Flowsheets (Taken 7/5/2025 0024)  Care Plan - Patient's Chronic Conditions and Co-Morbidity Symptoms are Monitored and Maintained or Improved:   Monitor and assess patient's chronic conditions and comorbid symptoms for stability, deterioration, or improvement   Collaborate with multidisciplinary team to address chronic and comorbid conditions and prevent exacerbation or deterioration     Problem: Discharge Planning  Goal: Discharge to home or other facility with appropriate resources  7/5/2025 0024 by Milton Reid RN  Outcome: Progressing  Flowsheets (Taken 7/5/2025 0024)  Discharge to home or other facility with appropriate resources:   Identify barriers to discharge with patient and caregiver   Arrange for needed discharge resources and transportation as appropriate     Problem: Skin/Tissue Integrity  Goal: Skin integrity remains intact  Description: 1.  Monitor for areas of redness and/or skin breakdown  2.  Assess vascular access sites hourly  3.  Every 4-6 hours minimum:  Change oxygen saturation probe site  4.  Every 4-6 hours:  If on nasal continuous positive airway pressure, respiratory therapy assess nares and determine need for appliance change or resting period  7/5/2025 0024 by Milton Reid RN  Outcome: Progressing  Flowsheets (Taken 7/5/2025 0024)  Skin Integrity Remains Intact:   Monitor for areas of redness and/or skin breakdown   Turn and reposition as indicated   Check visual cues for pain     Problem: Safety - Adult  Goal: Free from fall injury  7/5/2025 0024 by Milton Reid RN  Outcome: Progressing  Flowsheets (Taken 7/5/2025 0024)  Free From Fall Injury: Instruct family/caregiver on patient safety     Problem: Pain  Goal: Verbalizes/displays adequate comfort level or baseline comfort  ,DirectAddress_Unknown,DirectAddress_Unknown,DirectAddress_Unknown,DirectAddress_Unknown,DirectAddress_Unknown